# Patient Record
Sex: FEMALE | Race: WHITE | NOT HISPANIC OR LATINO | Employment: FULL TIME | URBAN - METROPOLITAN AREA
[De-identification: names, ages, dates, MRNs, and addresses within clinical notes are randomized per-mention and may not be internally consistent; named-entity substitution may affect disease eponyms.]

---

## 2017-01-05 ENCOUNTER — OFFICE VISIT (OUTPATIENT)
Dept: URGENT CARE | Facility: PHYSICIAN GROUP | Age: 32
End: 2017-01-05
Payer: OTHER MISCELLANEOUS

## 2017-01-05 VITALS
SYSTOLIC BLOOD PRESSURE: 128 MMHG | HEART RATE: 88 BPM | TEMPERATURE: 98.2 F | RESPIRATION RATE: 16 BRPM | DIASTOLIC BLOOD PRESSURE: 80 MMHG | OXYGEN SATURATION: 96 % | WEIGHT: 200 LBS | BODY MASS INDEX: 36.57 KG/M2

## 2017-01-05 DIAGNOSIS — J06.9 ACUTE UPPER RESPIRATORY INFECTION: ICD-10-CM

## 2017-01-05 DIAGNOSIS — J01.40 ACUTE PANSINUSITIS, RECURRENCE NOT SPECIFIED: ICD-10-CM

## 2017-01-05 DIAGNOSIS — R50.9 FEVER, UNSPECIFIED FEVER CAUSE: ICD-10-CM

## 2017-01-05 DIAGNOSIS — J10.1 INFLUENZA A: ICD-10-CM

## 2017-01-05 LAB
FLUAV+FLUBV AG SPEC QL IA: NORMAL
INT CON NEG: NEGATIVE
INT CON POS: POSITIVE

## 2017-01-05 PROCEDURE — 99214 OFFICE O/P EST MOD 30 MIN: CPT | Performed by: FAMILY MEDICINE

## 2017-01-05 PROCEDURE — 87804 INFLUENZA ASSAY W/OPTIC: CPT | Performed by: FAMILY MEDICINE

## 2017-01-05 RX ORDER — OSELTAMIVIR PHOSPHATE 75 MG/1
CAPSULE ORAL
Qty: 10 CAP | Refills: 0 | Status: SHIPPED | OUTPATIENT
Start: 2017-01-05 | End: 2017-01-05 | Stop reason: SDUPTHER

## 2017-01-05 RX ORDER — OSELTAMIVIR PHOSPHATE 75 MG/1
CAPSULE ORAL
Qty: 10 CAP | Refills: 0 | Status: SHIPPED | OUTPATIENT
Start: 2017-01-05 | End: 2017-09-21

## 2017-01-05 RX ORDER — AMOXICILLIN AND CLAVULANATE POTASSIUM 875; 125 MG/1; MG/1
TABLET, FILM COATED ORAL
Qty: 20 TAB | Refills: 0 | Status: SHIPPED | OUTPATIENT
Start: 2017-01-05 | End: 2017-09-21

## 2017-03-01 ENCOUNTER — HOSPITAL ENCOUNTER (OUTPATIENT)
Dept: RADIOLOGY | Facility: MEDICAL CENTER | Age: 32
End: 2017-03-01
Attending: NURSE PRACTITIONER
Payer: OTHER MISCELLANEOUS

## 2017-03-01 ENCOUNTER — OFFICE VISIT (OUTPATIENT)
Dept: URGENT CARE | Facility: PHYSICIAN GROUP | Age: 32
End: 2017-03-01
Payer: OTHER MISCELLANEOUS

## 2017-03-01 VITALS
OXYGEN SATURATION: 99 % | WEIGHT: 215 LBS | TEMPERATURE: 97.8 F | RESPIRATION RATE: 16 BRPM | HEART RATE: 74 BPM | HEIGHT: 62 IN | BODY MASS INDEX: 39.56 KG/M2 | DIASTOLIC BLOOD PRESSURE: 84 MMHG | SYSTOLIC BLOOD PRESSURE: 128 MMHG

## 2017-03-01 DIAGNOSIS — J40 BRONCHITIS: ICD-10-CM

## 2017-03-01 DIAGNOSIS — R05.9 COUGH: ICD-10-CM

## 2017-03-01 DIAGNOSIS — R06.02 SOB (SHORTNESS OF BREATH): ICD-10-CM

## 2017-03-01 PROCEDURE — 71020 DX-CHEST-2 VIEWS: CPT

## 2017-03-01 PROCEDURE — 99214 OFFICE O/P EST MOD 30 MIN: CPT | Performed by: NURSE PRACTITIONER

## 2017-03-01 RX ORDER — AZITHROMYCIN 250 MG/1
TABLET, FILM COATED ORAL
Qty: 6 TAB | Refills: 0 | Status: SHIPPED | OUTPATIENT
Start: 2017-03-01 | End: 2017-09-21

## 2017-03-01 RX ORDER — ALBUTEROL SULFATE 90 UG/1
2 AEROSOL, METERED RESPIRATORY (INHALATION) EVERY 6 HOURS PRN
Qty: 8.5 G | Refills: 1 | Status: SHIPPED | OUTPATIENT
Start: 2017-03-01 | End: 2017-09-21 | Stop reason: SDUPTHER

## 2017-03-01 ASSESSMENT — ENCOUNTER SYMPTOMS
COUGH: 1
SHORTNESS OF BREATH: 1
GASTROINTESTINAL NEGATIVE: 1
RHINORRHEA: 1
FEVER: 0
CHILLS: 0
MUSCULOSKELETAL NEGATIVE: 1
WHEEZING: 1

## 2017-03-01 NOTE — Clinical Note
March 1, 2017         Patient: Jennifer Gee   YOB: 1985   Date of Visit: 3/1/2017           To Whom it May Concern:    Jennifer Gee was seen in my clinic on 3/1/2017. She is able to return to work on 03/06/17.    If you have any questions or concerns, please don't hesitate to call.        Sincerely,       Luis Doss  Medical Assistant    Cathey J Hamman, A.P.CRUZ.  Electronically Signed

## 2017-03-02 NOTE — PROGRESS NOTES
Subjective:      Jennifer Gee is a 31 y.o. female who presents with Cough    Past Medical History   Diagnosis Date   • Gastritis    • Borderline personality disorder      cutter   • Bladder infection 2012   • Migraine 2014   • Pneumonia 2007     Social History     Social History   • Marital Status: Single     Spouse Name: N/A   • Number of Children: N/A   • Years of Education: N/A     Occupational History   • Not on file.     Social History Main Topics   • Smoking status: Never Smoker    • Smokeless tobacco: Never Used   • Alcohol Use: No   • Drug Use: Yes     Special: Inhaled, Marijuana      Comment: medical card   • Sexual Activity:     Partners: Female     Other Topics Concern   • Not on file     Social History Narrative     Family History   Problem Relation Age of Onset   • Stroke Neg Hx    • Diabetes Neg Hx    • Hyperlipidemia Mother    • Hypertension Mother    • Alcohol/Drug Father    • Heart Disease Maternal Grandfather    • Cancer Maternal Grandfather      Colon       Allergies: Review of patient's allergies indicates no known allergies.    This patient is a 31-year-old female who presents with complaint of cough for the last 4 weeks. She also reports chest congestion and painful swallowing. Patient was seen in early January of this year at urgent care and was diagnosed with influenza type B. Patient states she recovered from this and for the last 2 weeks of January felt very well. However she states on February 1, her cough returned and she has been coughing intermittently since. She states she has been short of breath with this. Denies fever, aches, or chills. Positive prior history of pneumonia per patient.        Cough  This is a new problem. The current episode started 1 to 4 weeks ago. The problem has been gradually worsening. The problem occurs every few minutes. The cough is non-productive. Associated symptoms include nasal congestion, postnasal drip, rhinorrhea, shortness of breath and  "wheezing. Pertinent negatives include no chills or fever. She has tried OTC cough suppressant for the symptoms. The treatment provided no relief.       Review of Systems   Constitutional: Positive for malaise/fatigue. Negative for fever and chills.   HENT: Positive for congestion, postnasal drip and rhinorrhea.    Respiratory: Positive for cough, shortness of breath and wheezing.    Gastrointestinal: Negative.    Musculoskeletal: Negative.    Skin: Negative.      All other systems reviewed and are negative      Objective:     /84 mmHg  Pulse 74  Temp(Src) 36.6 °C (97.8 °F)  Resp 16  Ht 1.575 m (5' 2\")  Wt 97.523 kg (215 lb)  BMI 39.31 kg/m2  SpO2 99%     Physical Exam   Constitutional: She is oriented to person, place, and time. She appears well-developed and well-nourished. No distress.   HENT:   Head: Normocephalic.   Right Ear: External ear normal.   Left Ear: External ear normal.   Nose: Nose normal.   Mouth/Throat: Oropharynx is clear and moist. No oropharyngeal exudate.   Eyes: Conjunctivae and EOM are normal. Pupils are equal, round, and reactive to light. Right eye exhibits no discharge.   Neck: Normal range of motion. Neck supple.   Cardiovascular: Normal rate and regular rhythm.    Pulmonary/Chest: Effort normal. She has wheezes.   Breath sounds harsh   Musculoskeletal: Normal range of motion.   Neurological: She is alert and oriented to person, place, and time.   Skin: Skin is warm and dry. She is not diaphoretic.   Psychiatric: She has a normal mood and affect. Her behavior is normal.   Vitals reviewed.    X-ray, chest:     /1/2017 6:33 PM    HISTORY/REASON FOR EXAM:  Shortness of Breath.      TECHNIQUE/EXAM DESCRIPTION AND NUMBER OF VIEWS:  Two views of the chest.    COMPARISON:  None.    FINDINGS:  The heart is normal in size.  No pulmonary infiltrates or consolidations are noted.  No pleural effusions are appreciated.           Impression        No active disease             "   Assessment/Plan:     1. SOB (shortness of breath)/Bronchitis    - DX-CHEST-2 VIEWS; Future  -Zithromax; start only for production of purulent sputum.   -albuterol      2. Cough    -tussionex PRN cough; clearly stated no driving or alcohol with this medication.  - DX-CHEST-2 VIEWS; Future

## 2017-09-21 ENCOUNTER — OFFICE VISIT (OUTPATIENT)
Dept: MEDICAL GROUP | Facility: CLINIC | Age: 32
End: 2017-09-21
Payer: OTHER MISCELLANEOUS

## 2017-09-21 VITALS
OXYGEN SATURATION: 99 % | RESPIRATION RATE: 14 BRPM | BODY MASS INDEX: 43.39 KG/M2 | HEART RATE: 85 BPM | DIASTOLIC BLOOD PRESSURE: 84 MMHG | SYSTOLIC BLOOD PRESSURE: 126 MMHG | WEIGHT: 221 LBS | HEIGHT: 60 IN | TEMPERATURE: 98.4 F

## 2017-09-21 DIAGNOSIS — E66.01 MORBID OBESITY WITH BMI OF 40.0-44.9, ADULT (HCC): ICD-10-CM

## 2017-09-21 DIAGNOSIS — J01.91 ACUTE RECURRENT SINUSITIS, UNSPECIFIED LOCATION: ICD-10-CM

## 2017-09-21 DIAGNOSIS — Z23 NEED FOR VACCINATION: ICD-10-CM

## 2017-09-21 DIAGNOSIS — R05.9 COUGH: ICD-10-CM

## 2017-09-21 DIAGNOSIS — R06.02 SOB (SHORTNESS OF BREATH): ICD-10-CM

## 2017-09-21 PROCEDURE — 90472 IMMUNIZATION ADMIN EACH ADD: CPT | Performed by: PHYSICIAN ASSISTANT

## 2017-09-21 PROCEDURE — 90732 PPSV23 VACC 2 YRS+ SUBQ/IM: CPT | Performed by: PHYSICIAN ASSISTANT

## 2017-09-21 PROCEDURE — 90686 IIV4 VACC NO PRSV 0.5 ML IM: CPT | Performed by: PHYSICIAN ASSISTANT

## 2017-09-21 PROCEDURE — 99213 OFFICE O/P EST LOW 20 MIN: CPT | Mod: 25 | Performed by: PHYSICIAN ASSISTANT

## 2017-09-21 PROCEDURE — 90715 TDAP VACCINE 7 YRS/> IM: CPT | Performed by: PHYSICIAN ASSISTANT

## 2017-09-21 PROCEDURE — 90471 IMMUNIZATION ADMIN: CPT | Performed by: PHYSICIAN ASSISTANT

## 2017-09-21 RX ORDER — ALBUTEROL SULFATE 90 UG/1
2 AEROSOL, METERED RESPIRATORY (INHALATION) EVERY 6 HOURS PRN
Qty: 8.5 G | Refills: 1 | Status: SHIPPED | OUTPATIENT
Start: 2017-09-21 | End: 2018-01-22 | Stop reason: SDUPTHER

## 2017-09-21 ASSESSMENT — PATIENT HEALTH QUESTIONNAIRE - PHQ9
CLINICAL INTERPRETATION OF PHQ2 SCORE: 5
SUM OF ALL RESPONSES TO PHQ QUESTIONS 1-9: 21
5. POOR APPETITE OR OVEREATING: 2 - MORE THAN HALF THE DAYS

## 2017-09-21 NOTE — PROGRESS NOTES
Chief Complaint   Patient presents with   • Establish Care       HISTORY OF PRESENT ILLNESS: Patient is a 32 y.o. female established patient who presents today for evaluation and management of:    Need for vaccination  Patient states that her coworker recommended she receive the pneumonia vaccination due to her frequent upper respiratory and respiratory infections. He presents today for Pneumovax 23, T Dap and influenza vaccines.    Morbid obesity with BMI of 40.0-44.9, adult ([facility])  Patient is aware of this and believes it is due to her polycystic ovarian syndrome.    Acute recurrent sinusitis  Patient has had sinusitis and upper and lower respiratory infections beyond 6 times this year. She is feeling too with antibiotics and medicine however thorough examination of her sinuses has never been completed.       Patient Active Problem List    Diagnosis Date Noted   • Acute recurrent sinusitis 09/21/2017   • Need for vaccination 09/21/2017   • Morbid obesity with BMI of 40.0-44.9, adult ([facility]) 09/21/2017   • Bipolar affective disorder (CMS-HCC) 02/16/2016   • PCOS (polycystic ovarian syndrome) 02/16/2016   • Acne vulgaris 02/16/2016       Allergies:Review of patient's allergies indicates no known allergies.    Current Outpatient Prescriptions   Medication Sig Dispense Refill   • albuterol 108 (90 Base) MCG/ACT Aero Soln inhalation aerosol Inhale 2 Puffs by mouth every 6 hours as needed for Shortness of Breath. 8.5 g 1   • lamotrigine (LAMICTAL) 100 MG Tab Take 1 Tab by mouth every day. 30 Tab 3   • Hydrocod Polst-CPM Polst ER (TUSSIONEX PENNKINETIC ER) 10-8 MG/5ML Suspension Extended Release Take 5 mL by mouth every 12 hours. 120 mL 0   • benzoyl peroxide-erythromycin (BENZAMYCIN) gel Bid to acne 30 g 5   • clonazepam (KLONOPIN) 0.5 MG TABS Take 0.25 mg by mouth 2 times a day.       No current facility-administered medications for this visit.        Social History   Substance Use Topics   • Smoking status: Never Smoker    • Smokeless tobacco: Never Used   • Alcohol use No       Family Status   Relation Status   • Mother Alive   • Father    • Maternal Grandfather    • Neg Hx      Family History   Problem Relation Age of Onset   • Hyperlipidemia Mother    • Hypertension Mother    • Alcohol/Drug Father    • Heart Disease Maternal Grandfather    • Cancer Maternal Grandfather      Colon   • Stroke Neg Hx    • Diabetes Neg Hx        Review of Systems:   Constitutional: Negative for fever, chills, weight loss.   HENT: Negative for ear pain, nosebleeds, sore throat and neck pain.    Eyes: Negative for blurred vision.   Respiratory: Negative for cough, sputum production, shortness of breath and wheezing.    Cardiovascular: Negative for chest pain, palpitations, orthopnea and leg swelling.    Genitourinary: Negative for dysuria, urgency and frequency.   Musculoskeletal: Negative for myalgias, back pain and joint pain.      Exam:  Blood pressure 126/84, pulse 85, temperature 36.9 °C (98.4 °F), resp. rate 14, height 1.524 m (5'), weight 100.2 kg (221 lb), SpO2 99 %.  Body mass index is 43.16 kg/m².  General:  Obese female in NAD  Head: is grossly normal. Mild tenderness to bilateral frontal and maxillary sinuses.  Neck: Supple without masses. Thyroid is not visibly enlarged.  Pulmonary:  Normal effort.   Cardiovascular:  Carotid and radial pulses are intact and equal bilaterally.  Extremities: no clubbing, cyanosis, or edema.    Medical decision-making and discussion:  1. Need for vaccination  - PneumoVax PPV23 =>1yo  - Tdap =>8yo IM  - Flu Quad Inj >3 Year Pre-Filled PF    2. Acute recurrent sinusitis, unspecified location  - REFERRAL TO ENT  - CT-LOCALIZATION LIMITED SINUSES; Future    3. SOB (shortness of breath)  - albuterol 108 (90 Base) MCG/ACT Aero Soln inhalation aerosol; Inhale 2 Puffs by mouth every 6 hours as needed for Shortness of Breath.  Dispense: 8.5 g; Refill: 1      5. Morbid obesity with BMI of 40.0-44.9, adult  (CMS-[facility])  - Patient identified as having weight management issue.  Appropriate orders and counseling given.      Please note that this dictation was created using voice recognition software. I have made every reasonable attempt to correct obvious errors, but I expect that there are errors of grammar and possibly content that I did not discover before finalizing the note.      Return for Female annual.

## 2017-09-21 NOTE — ASSESSMENT & PLAN NOTE
Patient states that her coworker recommended she receive the pneumonia vaccination due to her frequent upper respiratory and respiratory infections. He presents today for Pneumovax 23, T Dap and influenza vaccines.

## 2017-09-21 NOTE — ASSESSMENT & PLAN NOTE
Patient has had sinusitis and upper and lower respiratory infections beyond 6 times this year. She is feeling too with antibiotics and medicine however thorough examination of her sinuses has never been completed.

## 2017-10-09 ENCOUNTER — APPOINTMENT (OUTPATIENT)
Dept: RADIOLOGY | Facility: MEDICAL CENTER | Age: 32
End: 2017-10-09
Attending: PHYSICIAN ASSISTANT
Payer: OTHER MISCELLANEOUS

## 2017-10-12 ENCOUNTER — OFFICE VISIT (OUTPATIENT)
Dept: MEDICAL GROUP | Facility: CLINIC | Age: 32
End: 2017-10-12
Payer: OTHER MISCELLANEOUS

## 2017-10-12 VITALS
DIASTOLIC BLOOD PRESSURE: 84 MMHG | SYSTOLIC BLOOD PRESSURE: 124 MMHG | BODY MASS INDEX: 40.85 KG/M2 | TEMPERATURE: 99 F | WEIGHT: 222 LBS | OXYGEN SATURATION: 97 % | HEART RATE: 93 BPM | HEIGHT: 62 IN | RESPIRATION RATE: 16 BRPM

## 2017-10-12 DIAGNOSIS — J01.91 ACUTE RECURRENT SINUSITIS, UNSPECIFIED LOCATION: ICD-10-CM

## 2017-10-12 DIAGNOSIS — R21 RASH OF BACK: ICD-10-CM

## 2017-10-12 PROBLEM — Z23 NEED FOR VACCINATION: Status: RESOLVED | Noted: 2017-09-21 | Resolved: 2017-10-12

## 2017-10-12 PROCEDURE — 99212 OFFICE O/P EST SF 10 MIN: CPT | Performed by: PHYSICIAN ASSISTANT

## 2017-10-12 RX ORDER — METHYLPREDNISOLONE 4 MG/1
TABLET ORAL
Qty: 21 TAB | Refills: 0 | Status: SHIPPED | OUTPATIENT
Start: 2017-10-12 | End: 2018-01-22

## 2017-10-12 RX ORDER — TRIAMCINOLONE ACETONIDE 1 MG/ML
LOTION TOPICAL
Qty: 30 ML | Refills: 0 | Status: SHIPPED | OUTPATIENT
Start: 2017-10-12 | End: 2018-03-06

## 2017-10-12 ASSESSMENT — PATIENT HEALTH QUESTIONNAIRE - PHQ9: CLINICAL INTERPRETATION OF PHQ2 SCORE: 0

## 2017-10-12 NOTE — ASSESSMENT & PLAN NOTE
Patient went to [place] a couple of weeks ago for a business trip. She states she stayed at her boss's home overnight and was later informed that her boss has fleas in her home. She then took multiple airline flights which involved sitting for over 7 hours each way. She states that when she returned from her business trip her partner noticed a bunch of white spots on her back. Her brother suspected it was chigger bites. She has not done anything to try and make this better and it seems to be spreading as it was above her bra line and is now along her entire back from neck to low back. It is slightly itchy and she has been scratching it.

## 2017-10-12 NOTE — PROGRESS NOTES
Chief Complaint   Patient presents with   • Eye Problem     redness/itchy       HISTORY OF PRESENT ILLNESS: Patient is a 32 y.o. female established patient who presents today for evaluation and management of:    Rash of back  Patient went to [place] a couple of weeks ago for a business trip. She states she stayed at her boss's home overnight and was later informed that her boss has fleas in her home. She then took multiple airline flights which involved sitting for over 7 hours each way. She states that when she returned from her business trip her partner noticed a bunch of white spots on her back. Her brother suspected it was chigger bites. She has not done anything to try and make this better and it seems to be spreading as it was above her bra line and is now along her entire back from neck to low back. It is slightly itchy and she has been scratching it.        Patient Active Problem List    Diagnosis Date Noted   • Rash of back 10/12/2017   • Acute recurrent sinusitis 09/21/2017   • Morbid obesity with BMI of 40.0-44.9, adult ([facility]) 09/21/2017   • Bipolar affective disorder (CMS-HCC) 02/16/2016   • PCOS (polycystic ovarian syndrome) 02/16/2016   • Acne vulgaris 02/16/2016       Allergies:Review of patient's allergies indicates no known allergies.    Current Outpatient Prescriptions   Medication Sig Dispense Refill   • MethylPREDNISolone (MEDROL DOSEPAK) 4 MG Tablet Therapy Pack As directed on the packaging label. 21 Tab 0   • triamcinolone (KENALOG) 0.1 % lotion Apply a thin coat to back twice per day. 30 mL 0   • lamotrigine (LAMICTAL) 100 MG Tab Take 1 Tab by mouth every day. 30 Tab 3   • albuterol 108 (90 Base) MCG/ACT Aero Soln inhalation aerosol Inhale 2 Puffs by mouth every 6 hours as needed for Shortness of Breath. 8.5 g 1   • Hydrocod Polst-CPM Polst ER (TUSSIONEX PENNKINETIC ER) 10-8 MG/5ML Suspension Extended Release Take 5 mL by mouth every 12 hours. 120 mL 0   • benzoyl peroxide-erythromycin  "(BENZAMYCIN) gel Bid to acne 30 g 5   • clonazepam (KLONOPIN) 0.5 MG TABS Take 0.25 mg by mouth 2 times a day.       No current facility-administered medications for this visit.        Social History   Substance Use Topics   • Smoking status: Never Smoker   • Smokeless tobacco: Never Used   • Alcohol use No       Family Status   Relation Status   • Mother Alive   • Father    • Maternal Grandfather    • Neg Hx      Family History   Problem Relation Age of Onset   • Hyperlipidemia Mother    • Hypertension Mother    • Alcohol/Drug Father    • Heart Disease Maternal Grandfather    • Cancer Maternal Grandfather      Colon   • Stroke Neg Hx    • Diabetes Neg Hx        Review of Systems:   Constitutional: Negative for fever, chills, weight loss .   HENT: positive for ear pain, nosebleeds, congestion, sore throat and neck pain.    Eyes: Negative for blurred vision.   Respiratory: positive for cough. Negative sputum production, shortness of breath and wheezing.    Cardiovascular: Negative for chest pain, palpitations, and leg swelling.    Skin: positive for rash and itching.   Neurological: Negative for dizziness, tingling, tremors, sensory change, focal weakness.     Exam:  Blood pressure 124/84, pulse 93, temperature 37.2 °C (99 °F), resp. rate 16, height 1.575 m (5' 2\"), weight 100.7 kg (222 lb), SpO2 97 %.  Body mass index is 40.6 kg/m².  General:  Obese female in NAD  Head: is grossly normal.  Neck: Supple without masses. Thyroid is not visibly enlarged.  Pulmonary: Clear to ausculation. Normal effort. No rales, ronchi, or wheezing.  Cardiovascular: Regular rate and rhythm without murmur. Carotid and radial pulses are intact and equal bilaterally.  Extremities: no clubbing, cyanosis, or edema.  Skin: Diffuse non-blanching white diffuse papules on the entirety of the back skin with multiple crusting and erythematous  excoriations in the regions where the patient can reach with her fingers.     Medical " decision-making and discussion:  1. Rash of back  This is not suspected to be scabies or fleas due to lack of tunneling or erythema. This may be an allergic reaction to an unknown source or, potentially arthropod bites. Patient will try the following treatments and f no better in 2 weeks, will treat with permethrin 5% cream.   - MethylPREDNISolone (MEDROL DOSEPAK) 4 MG Tablet Therapy Pack; As directed on the packaging label.  Dispense: 21 Tab; Refill: 0  - triamcinolone (KENALOG) 0.1 % lotion; Apply a thin coat to back twice per day.  Dispense: 30 mL; Refill: 0      Please note that this dictation was created using voice recognition software. I have made every reasonable attempt to correct obvious errors, but I expect that there are errors of grammar and possibly content that I did not discover before finalizing the note.      Return in about 2 weeks (around 10/26/2017), or if symptoms worsen or fail to improve.

## 2018-01-22 ENCOUNTER — OFFICE VISIT (OUTPATIENT)
Dept: MEDICAL GROUP | Facility: CLINIC | Age: 33
End: 2018-01-22
Payer: OTHER MISCELLANEOUS

## 2018-01-22 VITALS
HEIGHT: 61 IN | DIASTOLIC BLOOD PRESSURE: 72 MMHG | SYSTOLIC BLOOD PRESSURE: 118 MMHG | TEMPERATURE: 98.6 F | BODY MASS INDEX: 40.78 KG/M2 | HEART RATE: 78 BPM | OXYGEN SATURATION: 98 % | WEIGHT: 216 LBS

## 2018-01-22 DIAGNOSIS — E66.01 MORBID OBESITY WITH BMI OF 40.0-44.9, ADULT (HCC): ICD-10-CM

## 2018-01-22 DIAGNOSIS — L70.0 ACNE VULGARIS: ICD-10-CM

## 2018-01-22 DIAGNOSIS — J45.20 MILD INTERMITTENT ASTHMA WITHOUT COMPLICATION: ICD-10-CM

## 2018-01-22 PROCEDURE — 99213 OFFICE O/P EST LOW 20 MIN: CPT | Performed by: PHYSICIAN ASSISTANT

## 2018-01-22 RX ORDER — ALBUTEROL SULFATE 90 UG/1
2 AEROSOL, METERED RESPIRATORY (INHALATION) EVERY 6 HOURS PRN
Qty: 8.5 G | Refills: 11 | Status: SHIPPED | OUTPATIENT
Start: 2018-01-22 | End: 2019-03-06 | Stop reason: SDUPTHER

## 2018-01-22 RX ORDER — ERYTHROMYCIN AND BENZOYL PEROXIDE 30; 50 MG/G; MG/G
GEL TOPICAL
Qty: 30 G | Refills: 5 | Status: SHIPPED | OUTPATIENT
Start: 2018-01-22 | End: 2019-04-30

## 2018-01-22 ASSESSMENT — PATIENT HEALTH QUESTIONNAIRE - PHQ9: CLINICAL INTERPRETATION OF PHQ2 SCORE: 0

## 2018-01-22 NOTE — LETTER
January 22, 2018        Jennifer Gee  [address] [place] [address]        Dear Jennifer:    I support this patient in completing the gastric procedure to assist in weight loss.    If you have any questions or concerns, please don't hesitate to call.        Sincerely,        Mari Cordova P.A.-C.    Electronically Signed

## 2018-01-23 NOTE — ASSESSMENT & PLAN NOTE
Patient recently began seeking help for this by consulting a surgeon to complete a gastric bypass or banding surgery. She has lost 6 lbs since October, 2017 and recently started metformin which has curbed her appetite and is helping her to lose weight.

## 2018-01-23 NOTE — ASSESSMENT & PLAN NOTE
While she is sick, she uses her albuterol inhaler about 4 days per week about twice per day but while she is well, the patient uses her albuterol inhaler less than once per week. She recently moved away from a home with a dog that was unsanitary and her symptoms have improved.

## 2018-01-23 NOTE — PROGRESS NOTES
Chief Complaint   Patient presents with   • Weight Loss       HISTORY OF PRESENT ILLNESS: Patient is a 32 y.o. female established patient who presents today for evaluation and management of:    Morbid obesity with BMI of 40.0-44.9, adult ([facility])  Patient recently began seeking help for this by consulting a surgeon to complete a gastric bypass or banding surgery. She has lost 6 lbs since October, 2017 and recently started metformin which has curbed her appetite and is helping her to lose weight.     Mild intermittent asthma without complication  While she is sick, she uses her albuterol inhaler about 4 days per week about twice per day but while she is well, the patient uses her albuterol inhaler less than once per week. She recently moved away from a home with a dog that was unsanitary and her symptoms have improved.     Acne vulgaris  Jena is requesting medication refills on her benzamycin gel today as this has worked well for her in the past. She has multiple cystic lesions on her face.        Patient Active Problem List    Diagnosis Date Noted   • Mild intermittent asthma without complication 01/22/2018   • Rash of back 10/12/2017   • Acute recurrent sinusitis 09/21/2017   • Morbid obesity with BMI of 40.0-44.9, adult ([facility]) 09/21/2017   • Bipolar affective disorder (CMS-HCC) 02/16/2016   • PCOS (polycystic ovarian syndrome) 02/16/2016   • Acne vulgaris 02/16/2016       Allergies:Patient has no known allergies.    Current Outpatient Prescriptions   Medication Sig Dispense Refill   • metformin (GLUCOPHAGE) 500 MG Tab Take 500 mg by mouth 3 times a day.     • benzoyl peroxide-erythromycin (BENZAMYCIN) gel Bid to acne 30 g 5   • albuterol 108 (90 Base) MCG/ACT Aero Soln inhalation aerosol Inhale 2 Puffs by mouth every 6 hours as needed for Shortness of Breath. 8.5 g 11   • lamotrigine (LAMICTAL) 100 MG Tab Take 1 Tab by mouth every day. 30 Tab 3   • triamcinolone (KENALOG) 0.1 % lotion Apply a thin coat to back  twice per day. 30 mL 0   • Hydrocod Polst-CPM Polst ER (TUSSIONEX PENNKINETIC ER) 10-8 MG/5ML Suspension Extended Release Take 5 mL by mouth every 12 hours. 120 mL 0   • clonazepam (KLONOPIN) 0.5 MG TABS Take 0.25 mg by mouth 2 times a day.       No current facility-administered medications for this visit.        Social History   Substance Use Topics   • Smoking status: Never Smoker   • Smokeless tobacco: Never Used   • Alcohol use No       Family Status   Relation Status   • Mother Alive   • Father    • Maternal Grandfather    • Neg Hx      Family History   Problem Relation Age of Onset   • Hyperlipidemia Mother    • Hypertension Mother    • Alcohol/Drug Father    • Heart Disease Maternal Grandfather    • Cancer Maternal Grandfather      Colon   • Stroke Neg Hx    • Diabetes Neg Hx        Review of Systems:   Constitutional: Negative for fever, chills, unintentional weight loss and malaise/fatigue.   HENT: Negative for ear pain, nosebleeds, congestion, sore throat and neck pain due to use of neti-pot sinus rinse every other day.     Eyes: Negative for blurred vision.   Respiratory: Negative for cough, sputum production, shortness of breath and wheezing except when ill with upper respiratory infection, not currently.    Cardiovascular: Negative for chest pain, palpitations, orthopnea and leg swelling.   Gastrointestinal: Negative for heartburn, nausea, vomiting and abdominal pain.   Genitourinary: Negative for dysuria, urgency and frequency.   Musculoskeletal: Negative for myalgias, back pain and joint pain.   Skin: Negative for rash and itching.   Neurological: Negative for dizziness, tingling, tremors, sensory change, focal weakness and headaches.   Endo/Heme/Allergies: Does not bruise/bleed easily.   Psychiatric/Behavioral: Positive for well-controlled depression. Negative for suicidal ideas and memory loss.  The patient is frequently nervous/anxious and does not have insomnia.      Exam:  Blood pressure  "118/72, pulse 78, temperature 37 °C (98.6 °F), height 1.549 m (5' 1\"), weight 98 kg (216 lb), SpO2 98 %.  Body mass index is 40.81 kg/m².  General: Morbidly Obese female in NAD  Head: is grossly normal.  Neck: Supple without masses. Thyroid is not visibly enlarged.  Pulmonary: Clear to ausculation. Normal effort. No rales, ronchi, or wheezing.  Cardiovascular: Regular rate and rhythm without murmur. Carotid and radial pulses are intact and equal bilaterally.  Extremities: no clubbing, cyanosis, or edema.    Medical decision-making and discussion:  1. Morbid obesity with BMI of 40.0-44.9, adult ([facility])  A letter of support was written for this patient to continue through with a gastric surgery for weight loss.     2. Acne vulgaris  - benzoyl peroxide-erythromycin (BENZAMYCIN) gel; Bid to acne  Dispense: 30 g; Refill: 5    3. Mild intermittent asthma without complication  - albuterol 108 (90 Base) MCG/ACT Aero Soln inhalation aerosol; Inhale 2 Puffs by mouth every 6 hours as needed for Shortness of Breath.  Dispense: 8.5 g; Refill: 11      Please note that this dictation was created using voice recognition software. I have made every reasonable attempt to correct obvious errors, but I expect that there are errors of grammar and possibly content that I did not discover before finalizing the note.      Return in about 4 weeks (around 2/19/2018) for weight check.  "

## 2018-01-23 NOTE — ASSESSMENT & PLAN NOTE
Jena is requesting medication refills on her benzamycin gel today as this has worked well for her in the past. She has multiple cystic lesions on her face.

## 2018-03-06 ENCOUNTER — OFFICE VISIT (OUTPATIENT)
Dept: MEDICAL GROUP | Facility: CLINIC | Age: 33
End: 2018-03-06
Payer: OTHER MISCELLANEOUS

## 2018-03-06 VITALS
TEMPERATURE: 99 F | SYSTOLIC BLOOD PRESSURE: 128 MMHG | DIASTOLIC BLOOD PRESSURE: 64 MMHG | HEART RATE: 88 BPM | RESPIRATION RATE: 18 BRPM | BODY MASS INDEX: 39.65 KG/M2 | OXYGEN SATURATION: 98 % | WEIGHT: 210 LBS | HEIGHT: 61 IN

## 2018-03-06 DIAGNOSIS — Z79.899 CHRONIC USE OF BENZODIAZEPINE FOR THERAPEUTIC PURPOSE: ICD-10-CM

## 2018-03-06 DIAGNOSIS — G62.9 POLYNEUROPATHY: ICD-10-CM

## 2018-03-06 DIAGNOSIS — E66.9 OBESITY (BMI 35.0-39.9 WITHOUT COMORBIDITY): ICD-10-CM

## 2018-03-06 DIAGNOSIS — R94.131 NONSPECIFIC ABNORMAL ELECTROMYOGRAM (EMG): ICD-10-CM

## 2018-03-06 DIAGNOSIS — F31.62 BIPOLAR DISORDER, CURRENT EPISODE MIXED, MODERATE (HCC): ICD-10-CM

## 2018-03-06 DIAGNOSIS — E28.2 PCOS (POLYCYSTIC OVARIAN SYNDROME): ICD-10-CM

## 2018-03-06 PROBLEM — E66.01 MORBID OBESITY WITH BMI OF 40.0-44.9, ADULT (HCC): Status: RESOLVED | Noted: 2017-09-21 | Resolved: 2018-03-06

## 2018-03-06 PROCEDURE — 99214 OFFICE O/P EST MOD 30 MIN: CPT | Performed by: PHYSICIAN ASSISTANT

## 2018-03-06 RX ORDER — LAMOTRIGINE 100 MG/1
100 TABLET ORAL DAILY
Qty: 90 TAB | Refills: 3 | Status: SHIPPED | OUTPATIENT
Start: 2018-03-06 | End: 2019-04-30

## 2018-03-06 RX ORDER — GABAPENTIN 100 MG/1
100 CAPSULE ORAL 3 TIMES DAILY
COMMUNITY
End: 2018-03-06

## 2018-03-06 RX ORDER — GABAPENTIN 100 MG/1
100 CAPSULE ORAL 3 TIMES DAILY
Qty: 270 CAP | Refills: 1 | Status: SHIPPED | OUTPATIENT
Start: 2018-03-06 | End: 2018-12-03

## 2018-03-07 NOTE — ASSESSMENT & PLAN NOTE
Patient has been taking metformin recently and since starting this, she has lost 12lbs. She was started on metformin due to the weight gain from PCOS by her gynecologist.

## 2018-03-07 NOTE — ASSESSMENT & PLAN NOTE
Well controlled at this time with lamictal and clonazepam. Patient states her symptoms were cycling between extreme depression and joking, elation. She feels very well regulated at this time.

## 2018-03-07 NOTE — PROGRESS NOTES
Chief Complaint   Patient presents with   • Weight Loss     weight check       HISTORY OF PRESENT ILLNESS: Patient is a 32 y.o. female established patient who presents today for evaluation and management of:    Polyneuropathy (CMS-HCC)  Discovered on EMG recently, the patient has been taking gabapentin 100mg TID which has greaty relieved her arm and leg pain and tingling sensation.     PCOS (polycystic ovarian syndrome)  Patient has been taking metformin recently and since starting this, she has lost 12lbs. She was started on metformin due to the weight gain from PCOS by her gynecologist.     Nonspecific abnormal electromyogram (EMG)  Recently completed. Severely abnormal per patient. Records are being obtained.     Chronic use of benzodiazepine for therapeutic purpose  Patient has been using clonazepam 0.5mg tab 45 tab per month as prescribed by her psychiatrist who will no longer see her due to her recent increase in income. She is requesting to transfer this prescription to her PCP's practice as soon as her most recent refill runs out in 2 months.  She is willing to stop using benzodiazepines if she is able to find an alternative medication that controls her anxiety well.     Bipolar affective disorder  Well controlled at this time with lamictal and clonazepam. Patient states her symptoms were cycling between extreme depression and joking, elation. She feels very well regulated at this time.    Obesity (BMI 35.0-39.9 without comorbidity) ([facility])  BMI has reduced to 39.68 down from 40.6 in Oct 2017. She no longer qualifies for Bariatric surgery which she is okay with at this time.        Patient Active Problem List    Diagnosis Date Noted   • Polyneuropathy (CMS-HCC) 03/06/2018   • Chronic use of benzodiazepine for therapeutic purpose 03/06/2018   • Nonspecific abnormal electromyogram (EMG) 03/06/2018   • Obesity (BMI 35.0-39.9 without comorbidity) ([facility]) 03/06/2018   • Mild intermittent asthma without complication  2018   • Rash of back 10/12/2017   • Acute recurrent sinusitis 2017   • Bipolar affective disorder (CMS-HCC) 2016   • PCOS (polycystic ovarian syndrome) 2016   • Acne vulgaris 2016       Allergies:Patient has no known allergies.    Current Outpatient Prescriptions   Medication Sig Dispense Refill   • gabapentin (NEURONTIN) 100 MG Cap Take 1 Cap by mouth 3 times a day. 270 Cap 1   • metformin (GLUCOPHAGE) 1000 MG tablet Take 1 Tab by mouth 2 times a day, with meals. 180 Tab 1   • lamoTRIgine (LAMICTAL) 100 MG Tab Take 1 Tab by mouth every day. 90 Tab 3   • benzoyl peroxide-erythromycin (BENZAMYCIN) gel Bid to acne 30 g 5   • albuterol 108 (90 Base) MCG/ACT Aero Soln inhalation aerosol Inhale 2 Puffs by mouth every 6 hours as needed for Shortness of Breath. 8.5 g 11   • clonazepam (KLONOPIN) 0.5 MG TABS Take 0.25 mg by mouth 2 times a day.       No current facility-administered medications for this visit.        Social History   Substance Use Topics   • Smoking status: Never Smoker   • Smokeless tobacco: Never Used   • Alcohol use No       Family Status   Relation Status   • Mother Alive   • Father    • Maternal Grandfather    • Neg Hx      Family History   Problem Relation Age of Onset   • Hyperlipidemia Mother    • Hypertension Mother    • Alcohol/Drug Father    • Heart Disease Maternal Grandfather    • Cancer Maternal Grandfather      Colon   • Stroke Neg Hx    • Diabetes Neg Hx        Review of Systems:   Constitutional: Negative for fever, chills, unintentional weight loss and malaise/fatigue.   HENT: Negative for ear pain, nosebleeds, congestion, sore throat and neck pain.    Eyes: Negative for blurred vision.   Respiratory: Negative for cough, sputum production, shortness of breath and wheezing.    Cardiovascular: Negative for chest pain, palpitations, orthopnea and leg swelling.   Gastrointestinal: Negative for heartburn, nausea, vomiting and abdominal pain.  "  Genitourinary: Negative for dysuria, urgency and frequency.   Musculoskeletal: Negative for myalgias, back pain and joint pain.   Skin: Negative for rash and itching.   Neurological: Positive for tingling, sensory change in bilateral arms and occasionally in feet and lower legs. Negative for dizziness, tremors, focal weakness and headaches.   Endo/Heme/Allergies: Does not bruise/bleed easily.   Psychiatric/Behavioral: Positive for well-controlled bipolar disorder. Negative for suicidal ideas and memory loss.  The patient is frequently nervous/anxious and does not have insomnia.      Exam:  Blood pressure 128/64, pulse 88, temperature 37.2 °C (99 °F), resp. rate 18, height 1.549 m (5' 1\"), weight 95.3 kg (210 lb), SpO2 98 %.  Body mass index is 39.68 kg/m².  General:  Obese female in NAD  Head: is grossly normal.  Neck: Supple without masses. Thyroid is not visibly enlarged.  Pulmonary: Clear to ausculation. Normal effort. No rales, ronchi, or wheezing.  Cardiovascular: Regular rate and rhythm without murmur. Carotid and radial pulses are intact and equal bilaterally.  Extremities: no clubbing, cyanosis, or edema.  Behavioral: Patient is frequently making jokes and is quite jovial throughout her history and physical today.    Medical decision-making and discussion:  1. Polyneuropathy (CMS-HCC)  - gabapentin (NEURONTIN) 100 MG Cap; Take 1 Cap by mouth 3 times a day.  Dispense: 270 Cap; Refill: 1  - CMP (12)    2. PCOS (polycystic ovarian syndrome)  - metformin (GLUCOPHAGE) 1000 MG tablet; Take 1 Tab by mouth 2 times a day, with meals.  Dispense: 180 Tab; Refill: 1    3. Nonspecific abnormal electromyogram (EMG)  - REFERRAL TO NEUROLOGY    4. Chronic use of benzodiazepine for therapeutic purpose  Patient was advised that over the next couple of months, she should attempt to reduce her daily dose down to no more than 0.5 tablet clonazepam per day in order to transition to a new antianxiety medication.    5. Bipolar " disorder, current episode mixed, moderate (CMS-HCC)  - lamoTRIgine (LAMICTAL) 100 MG Tab; Take 1 Tab by mouth every day.  Dispense: 90 Tab; Refill: 3    6. Obesity (BMI 35.0-39.9 without comorbidity)  Patient continues losing weight since starting metformin.  - Patient identified as having weight management issue.  Appropriate orders and counseling given.      Please note that this dictation was created using voice recognition software. I have made every reasonable attempt to correct obvious errors, but I expect that there are errors of grammar and possibly content that I did not discover before finalizing the note.      Return in about 2 months (around 5/6/2018) for follow up anxiety.

## 2018-03-07 NOTE — ASSESSMENT & PLAN NOTE
BMI has reduced to 39.68 down from 40.6 in Oct 2017. She no longer qualifies for Bariatric surgery which she is okay with at this time.

## 2018-03-07 NOTE — ASSESSMENT & PLAN NOTE
Patient has been using clonazepam 0.5mg tab 45 tab per month as prescribed by her psychiatrist who will no longer see her due to her recent increase in income. She is requesting to transfer this prescription to her PCP's practice as soon as her most recent refill runs out in 2 months.  She is willing to stop using benzodiazepines if she is able to find an alternative medication that controls her anxiety well.

## 2018-07-10 ENCOUNTER — OFFICE VISIT (OUTPATIENT)
Dept: NEUROLOGY | Facility: MEDICAL CENTER | Age: 33
End: 2018-07-10
Payer: OTHER MISCELLANEOUS

## 2018-07-10 ENCOUNTER — HOSPITAL ENCOUNTER (OUTPATIENT)
Dept: LAB | Facility: MEDICAL CENTER | Age: 33
End: 2018-07-10
Attending: PSYCHIATRY & NEUROLOGY
Payer: OTHER MISCELLANEOUS

## 2018-07-10 VITALS
RESPIRATION RATE: 16 BRPM | OXYGEN SATURATION: 97 % | WEIGHT: 202.16 LBS | SYSTOLIC BLOOD PRESSURE: 118 MMHG | TEMPERATURE: 98.2 F | BODY MASS INDEX: 37.2 KG/M2 | HEIGHT: 62 IN | HEART RATE: 92 BPM | DIASTOLIC BLOOD PRESSURE: 88 MMHG

## 2018-07-10 DIAGNOSIS — G62.9 POLYNEUROPATHY: Primary | ICD-10-CM

## 2018-07-10 DIAGNOSIS — G62.9 POLYNEUROPATHY: ICD-10-CM

## 2018-07-10 LAB
ALBUMIN SERPL BCP-MCNC: 4.2 G/DL (ref 3.2–4.9)
BUN SERPL-MCNC: 9 MG/DL (ref 8–22)
CALCIUM SERPL-MCNC: 9.4 MG/DL (ref 8.5–10.5)
CHLORIDE SERPL-SCNC: 105 MMOL/L (ref 96–112)
CO2 SERPL-SCNC: 23 MMOL/L (ref 20–33)
CREAT SERPL-MCNC: 0.62 MG/DL (ref 0.5–1.4)
FOLATE SERPL-MCNC: >24 NG/ML
GLUCOSE SERPL-MCNC: 99 MG/DL (ref 65–99)
PHOSPHATE SERPL-MCNC: 3 MG/DL (ref 2.5–4.5)
POTASSIUM SERPL-SCNC: 3.6 MMOL/L (ref 3.6–5.5)
SODIUM SERPL-SCNC: 139 MMOL/L (ref 135–145)
TSH SERPL DL<=0.005 MIU/L-ACNC: 3.84 UIU/ML (ref 0.38–5.33)
VIT B12 SERPL-MCNC: 334 PG/ML (ref 211–911)

## 2018-07-10 PROCEDURE — 99205 OFFICE O/P NEW HI 60 MIN: CPT | Performed by: PSYCHIATRY & NEUROLOGY

## 2018-07-10 PROCEDURE — 86038 ANTINUCLEAR ANTIBODIES: CPT

## 2018-07-10 PROCEDURE — 86225 DNA ANTIBODY NATIVE: CPT

## 2018-07-10 PROCEDURE — 83655 ASSAY OF LEAD: CPT

## 2018-07-10 PROCEDURE — 80069 RENAL FUNCTION PANEL: CPT

## 2018-07-10 PROCEDURE — 84446 ASSAY OF VITAMIN E: CPT

## 2018-07-10 PROCEDURE — 82175 ASSAY OF ARSENIC: CPT

## 2018-07-10 PROCEDURE — 86235 NUCLEAR ANTIGEN ANTIBODY: CPT

## 2018-07-10 PROCEDURE — 82300 ASSAY OF CADMIUM: CPT

## 2018-07-10 PROCEDURE — 84443 ASSAY THYROID STIM HORMONE: CPT

## 2018-07-10 PROCEDURE — 82746 ASSAY OF FOLIC ACID SERUM: CPT

## 2018-07-10 PROCEDURE — 84160 ASSAY OF PROTEIN ANY SOURCE: CPT

## 2018-07-10 PROCEDURE — 86800 THYROGLOBULIN ANTIBODY: CPT

## 2018-07-10 PROCEDURE — 82607 VITAMIN B-12: CPT

## 2018-07-10 PROCEDURE — 36415 COLL VENOUS BLD VENIPUNCTURE: CPT

## 2018-07-10 PROCEDURE — 83516 IMMUNOASSAY NONANTIBODY: CPT

## 2018-07-10 PROCEDURE — 86255 FLUORESCENT ANTIBODY SCREEN: CPT

## 2018-07-10 PROCEDURE — 85652 RBC SED RATE AUTOMATED: CPT

## 2018-07-10 PROCEDURE — 83825 ASSAY OF MERCURY: CPT

## 2018-07-10 PROCEDURE — 84165 PROTEIN E-PHORESIS SERUM: CPT

## 2018-07-10 RX ORDER — NAPROXEN SODIUM 550 MG/1
550 TABLET ORAL 2 TIMES DAILY WITH MEALS
Qty: 60 TAB | Refills: 3 | Status: SHIPPED | OUTPATIENT
Start: 2018-07-10 | End: 2019-07-10 | Stop reason: SDUPTHER

## 2018-07-10 RX ORDER — GABAPENTIN 300 MG/1
CAPSULE ORAL
Qty: 120 CAP | Refills: 1 | Status: SHIPPED | OUTPATIENT
Start: 2018-07-10 | End: 2018-09-27 | Stop reason: SDUPTHER

## 2018-07-10 RX ORDER — IBUPROFEN 200 MG
200 TABLET ORAL EVERY 6 HOURS PRN
COMMUNITY
End: 2018-11-21

## 2018-07-10 ASSESSMENT — ENCOUNTER SYMPTOMS
DEPRESSION: 0
SENSORY CHANGE: 1
TINGLING: 1
NECK PAIN: 0
FALLS: 0
MEMORY LOSS: 0
LOSS OF CONSCIOUSNESS: 0
FOCAL WEAKNESS: 1
CONSTIPATION: 0
MYALGIAS: 0
DOUBLE VISION: 0

## 2018-07-10 ASSESSMENT — PAIN SCALES - GENERAL: PAINLEVEL: 8=MODERATE-SEVERE PAIN

## 2018-07-11 LAB — ERYTHROCYTE [SEDIMENTATION RATE] IN BLOOD BY WESTERGREN METHOD: 13 MM/HOUR (ref 0–20)

## 2018-07-11 NOTE — PROGRESS NOTES
Subjective:      Jennifer Gee is a 32 y.o. female who presents for consultation from the office of Mari Cordova PA-C, with a nearly one-year history of progressive bilateral paresthesias and weakness in all 4 extremities.    PARK Rodriguez is a pleasant 32-year-old right-handed  female whose symptoms started rather indolently, without a singular and inciting event, which have slowly progressed since then. She initially had right-sided hand numbness and tingling, feeling of coldness in the fingers. With time it began to spread proximally up the forearm and then the left hand and forearm became involved in mirror-type distribution. It was some loss of dexterity. She seemed to be at best in the mornings, symptoms never awakened her from sleep, but they did progress as the day wore on. 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 Still, the symptoms continued to ascend proximally up to the shoulders and then eventually down into the rest of her body and both feet.    Symptoms have always been worse on the right side. She describes a constant discomfort with both hands, the numbness and cold sensations are no longer dependent on use of the extremities, time of day, rest, etc. Head and neck position, Valsalva, etc. also had no effect. She denies neck pain with radiating symptoms into the upper extremities, Lhermitte's phenomena, etc.    In both lower extremities, also involved in their entirety, she describes the same type of sensory distortion, balance is only slightly curtailed, she is not walking with a foot drop, she does not trip, she does not feel more unsteady than usual. Bowel and bladder function have been stable. In addition, she denies any cognitive symptoms, malaise or fatigue, visual loss or double vision, or bulbar symptoms.    She did undergo a work up, but EMG/NCV studies of both upper extremities from November, 2017 was done, I reviewed copies of the  report, revealing both delayed and CVA and increased distal latencies consistent with a sensory motor polyneuropathy, there was no demonstration on EMG of cervical radiculopathy. Unfortunately, lower extremities were not studied. Neurologic consultation was then requested. On Lamictal 100 mg daily for bipolar disease, this was not adjusted, gabapentin 100 mg, 3 times a day was added, and this has provided benefit. Naproxen 500 mg, twice a day, also provided benefit. She now presents.    She has a history of migraine without aura, bipolar disease with borderline personality, PCOS, but no history of diagnosed diabetes, thyroid disease, malignancy, MS, neurodegenerative disease, autoimmune disease, blood dyscrasia, liver or kidney disease, hypertension, CAD, CVA or PVD. There is no surgical history of note from my standpoint. Her mother evidently does have a history of sensory distortions in both feet though a diagnosis has never been established, her mother's twin sister has a daughter does have a diagnosis of neuropathy. Her father  at a young age from pneumonia. Both her half brothers on her mother's side are alive and well without similar symptoms. She does not smoke or drink.    She is on Glucophage, Lamictal 100 mg daily, Neurontin 100 mg, 3 times a day, Klonopin and Motrin. There are no known drug allergies.    Review of Systems   Constitutional: Negative for malaise/fatigue.   Eyes: Negative for double vision.   Cardiovascular: Negative for leg swelling.   Gastrointestinal: Negative for constipation.   Genitourinary: Negative for dysuria and frequency.   Musculoskeletal: Negative for falls, myalgias and neck pain.   Skin: Negative for rash.   Neurological: Positive for tingling, sensory change and focal weakness. Negative for loss of consciousness.   Psychiatric/Behavioral: Negative for depression and memory loss.   All other systems reviewed and are negative.       Objective:     /88   Pulse 92    "Temp 36.8 °C (98.2 °F)   Resp 16   Ht 1.575 m (5' 2\")   Wt 91.7 kg (202 lb 2.6 oz)   SpO2 97%   BMI 36.98 kg/m²      Physical Exam    She appears in no acute distress. Her vital signs are stable. There is no malar rash or temporal tenderness. The neck is supple, range of motion is full. Lhermitte's phenomenon was absent, carotid pulses are present bilaterally without asymmetry or bruits, compression maneuvers are negative. There is some minimal tenderness to palpation of both wrists and medial elbows, but compression maneuvers are negative. Pes cavus is present bilaterally, her feet are quite small, body habitus is a little unusual, there is significant obesity, almost a hyperflexibility at the knees as she walks, she is knock-knee'd. Distal pulses are intact.    Cognition is intact, she is fully oriented, there is no aphasia, apraxia, or attention.    PERRLA/EOMI, visual fields are full to finger counting, funduscopic exam reveals sharp disc margins without pallor, facial movements are symmetric, sensory exam is intact to temperature, light touch and pinprick bilaterally, jaw jerk is absent, the tongue and uvula are midline without dysarthria, shoulder shrug and head rotation are intact.    Musculoskeletal exam reveals no evidence of atrophy or fasciculations. Tone is normal, there is no tremor, asterixis, or drift. Strength in the upper extremities including the hands bilaterally is grossly intact. There is weakness with both dorsiflexion and to a lesser degree plantar flexion in the feet, right more than left. There may be some weakness with knee extension on the right. The knee jerks are present, left brisker than right, the ankle jerks are absent bilaterally. Other reflexes are hypoactive. Both toes are equivocal to downgoing.    She walks flat-footed, does not need to look at the ground but she is unsteady nonetheless. There was a waddling quality. She cannot walk on her toes, heel walking is a little " easier. Repetitive movements with both feet are notably curtailed. These movements are better with the hands though they're still slowed. Amplitudes are maintained. There is no appendicular dystaxia with any of the extremities.    Sensory exam shows some decrement in the hands to temperature, vibration and JPS are intact. Pinprick seems to be slightly diminished in the forearms bilaterally. In the lower extremities, there was a graded loss of pin below the ankles, vibration and JPS seemed to be intact. Romberg cannot be accurately assessed because of the knee deformities.     Assessment/Plan:     1. Polyneuropathy (HCC)  Surprising find on EMG/NCV, unfortunately the lower extremities were not involved, the study does need to be repeated to involve the legs but also to see if there has been some progression with the upper extremity's. Clinically this would be consistent with a demyelinating process, the results of her initial NC these are consistent with this. There may be a family history, she has pes cavus and some other musculoskeletal issues that are often seen in hereditary neuropathies. She lacks a history of typical risk otherwise. CIDP, other autoimmune or inflammatory processes, as well as vitamin deficiency states, thyroid dysfunction, paraproteinemias, etc. also need to be ruled out.    Possibility of a cervical spine process also needs to be entertained, and though her exam is not overtly myelopathic, the severity of the peripheral neuropathy may mask CNS findings such as hyperreflexia, Babinski signs, increased tone, etc. MRI of the cervical spine is indicated. If this is positive, CSF studies, NMO antibodies, etc., will likely be necessary.    Symptomatically, gabapentin will be increased, Anaprox 500 mg, twice a day, added again since these provided some benefit. Neurontin 300 mg daily at bedtime will be started, and in 2 weeks increase to 600 mg. Side effects were reviewed.    Face-to-face time was  spent reviewing all of the above. We talked about the different possibilities as a cause for her symptoms, as well as the rationale for repeating neurophysiologic studies, obtaining MRI of the cervical spine, as well as blood work. I will follow up afterwards.    - REFERRAL TO NEURODIAGNOSTICS (EEG,EP,EMG/NCS/DBS) Modality Requested: EMG/NCS-Comment Extremities  - MR-CERVICAL SPINE-WITH & W/O; Future  - gabapentin (NEURONTIN) 300 MG Cap; 1 tab at bed for 1 week, then 2 tab at bed for 1 week, then 3 tab at bed for 1 week, then 4 tab qhs.  Dispense: 120 Cap; Refill: 1  - WESTERGREN SED RATE; Future  - TSH; Future  - VITAMIN B12; Future  - FOLATE; Future  - SPEP W/REFLEX TO MARCY, A, G, M; Future  - MAG ANTIBODY SERUM; Future  - ASIALOGM1 ANTIBODY TRISTON; Future  - ANTITHYROGLOBULIN AB; Future  - SJOGREN'S AB, ANTI-SS-A/-SS-B  - GIL ANTIBODY WITH REFLEX; Future  - ANTI-NEUTROPHIL CYTOPLASMIC AB W/RFLX; Future  - HEAVY METALS BLOOD; Future  - VITAMIN E; Future  - RENAL FUNCTION PANEL; Future    Time: Evaluation of 60 minutes for exam, review, discussion, and education  Discussion: As mentioned in the assessment, over 60% of the time spent face-to-face counseling and coronary care

## 2018-07-12 LAB
ANCA IGG TITR SER IF: NORMAL {TITER}
ENA SS-B IGG SER IA-ACNC: 0 AU/ML (ref 0–40)
GD1A GANGL AB SER IA-ACNC: 3 IV (ref 0–50)
GD1B GANGL AB SER IA-ACNC: 5 IV (ref 0–50)
GM1 ASIALO AB SER IA-ACNC: NORMAL (ref 0–50)
GM1 GANGL IGG+IGM SER QL IA: 3 IV (ref 0–50)
GM2 GANGL IGG+IGM SER QL IA: 8 IV (ref 0–50)
GQ1B GANGL AB SER-ACNC: 2 IV (ref 0–50)
MAG IGM SER IA-ACNC: 25 TU (ref 0–999)
NUCLEAR IGG SER QL IA: NORMAL
SSA52 R0ENA AB IGG Q0420: 11 AU/ML (ref 0–40)
SSA60 R0ENA AB IGG Q0419: 1 AU/ML (ref 0–40)
THYROGLOB AB SERPL-ACNC: <0.9 IU/ML (ref 0–4)

## 2018-07-13 LAB
A-TOCOPHEROL VIT E SERPL-MCNC: 8.9 MG/L (ref 5.5–18)
ALBUMIN SERPL-MCNC: 4.35 G/DL (ref 3.75–5.01)
ALPHA1 GLOB SERPL ELPH-MCNC: 0.29 G/DL (ref 0.19–0.46)
ALPHA2 GLOB SERPL ELPH-MCNC: 0.74 G/DL (ref 0.48–1.05)
ARSENIC BLD-MCNC: <10 UG/L (ref 0–13)
B-GLOBULIN SERPL ELPH-MCNC: 0.88 G/DL (ref 0.48–1.1)
BETA+GAMMA TOCOPHEROL SERPL-MCNC: 1.6 MG/L (ref 0–6)
CADMIUM BLD-MCNC: <1 UG/L (ref 0–5)
GAMMA GLOB SERPL ELPH-MCNC: 1.04 G/DL (ref 0.62–1.51)
INTERPRETATION SERPL IFE-IMP: NORMAL
LEAD BLDV-MCNC: <2 UG/DL (ref 0–4.9)
MERCURY BLD-MCNC: <3 UG/L (ref 0–10)
MONOCLON BAND OBS SERPL: NORMAL
PATHOLOGY STUDY: NORMAL
PROT SERPL-MCNC: 7.3 G/DL (ref 6–8.3)

## 2018-07-21 ENCOUNTER — HOSPITAL ENCOUNTER (OUTPATIENT)
Dept: RADIOLOGY | Facility: MEDICAL CENTER | Age: 33
End: 2018-07-21
Attending: PSYCHIATRY & NEUROLOGY
Payer: OTHER MISCELLANEOUS

## 2018-07-21 DIAGNOSIS — G62.9 POLYNEUROPATHY: ICD-10-CM

## 2018-07-21 PROCEDURE — A9585 GADOBUTROL INJECTION: HCPCS | Performed by: PSYCHIATRY & NEUROLOGY

## 2018-07-21 PROCEDURE — 72156 MRI NECK SPINE W/O & W/DYE: CPT

## 2018-07-21 PROCEDURE — 700117 HCHG RX CONTRAST REV CODE 255: Performed by: PSYCHIATRY & NEUROLOGY

## 2018-07-21 RX ORDER — GADOBUTROL 604.72 MG/ML
9 INJECTION INTRAVENOUS ONCE
Status: COMPLETED | OUTPATIENT
Start: 2018-07-21 | End: 2018-07-21

## 2018-07-21 RX ADMIN — GADOBUTROL 9 ML: 604.72 INJECTION INTRAVENOUS at 15:06

## 2018-09-26 ENCOUNTER — OFFICE VISIT (OUTPATIENT)
Dept: URGENT CARE | Facility: CLINIC | Age: 33
End: 2018-09-26
Payer: OTHER MISCELLANEOUS

## 2018-09-26 VITALS
OXYGEN SATURATION: 98 % | WEIGHT: 201.2 LBS | DIASTOLIC BLOOD PRESSURE: 86 MMHG | RESPIRATION RATE: 16 BRPM | HEART RATE: 82 BPM | HEIGHT: 62 IN | BODY MASS INDEX: 37.02 KG/M2 | SYSTOLIC BLOOD PRESSURE: 118 MMHG | TEMPERATURE: 98.6 F

## 2018-09-26 DIAGNOSIS — J02.0 STREP PHARYNGITIS: ICD-10-CM

## 2018-09-26 LAB
INT CON NEG: NORMAL
INT CON POS: NORMAL
S PYO AG THROAT QL: POSITIVE

## 2018-09-26 PROCEDURE — 99214 OFFICE O/P EST MOD 30 MIN: CPT | Performed by: PHYSICIAN ASSISTANT

## 2018-09-26 PROCEDURE — 87880 STREP A ASSAY W/OPTIC: CPT | Performed by: PHYSICIAN ASSISTANT

## 2018-09-26 RX ORDER — AMOXICILLIN 500 MG/1
500 CAPSULE ORAL 2 TIMES DAILY
Qty: 20 CAP | Refills: 0 | Status: SHIPPED | OUTPATIENT
Start: 2018-09-26 | End: 2018-10-06

## 2018-09-26 ASSESSMENT — ENCOUNTER SYMPTOMS
ABDOMINAL PAIN: 0
SHORTNESS OF BREATH: 0
DIZZINESS: 0
MUSCULOSKELETAL NEGATIVE: 1
SWOLLEN GLANDS: 1
CHILLS: 0
FEVER: 0
NAUSEA: 0
DIARRHEA: 0
VOMITING: 0
TROUBLE SWALLOWING: 1

## 2018-09-26 NOTE — LETTER
September 26, 2018         Patient: Jennifer Gee   YOB: 1985   Date of Visit: 9/26/2018           To Whom it May Concern:    Jennifer Gee was seen in my clinic on 9/26/2018. Please excuse her absence from 9/27/18-9/28/18.    If you have any questions or concerns, please don't hesitate to call.        Sincerely,           Shari Brar P.A.-C.  Electronically Signed

## 2018-09-27 DIAGNOSIS — G62.9 POLYNEUROPATHY: ICD-10-CM

## 2018-09-27 RX ORDER — GABAPENTIN 300 MG/1
1200 CAPSULE ORAL
Qty: 120 CAP | Refills: 6 | Status: SHIPPED | OUTPATIENT
Start: 2018-09-27 | End: 2018-12-03

## 2018-09-27 NOTE — PROGRESS NOTES
"Subjective:      Jennifer Gee is a 33 y.o. female who presents with Pharyngitis (x 2 days / )            Pharyngitis    This is a new problem. The current episode started yesterday. The problem has been unchanged. The pain is worse on the right side. There has been no fever. The pain is at a severity of 3/10. The pain is mild. Associated symptoms include swollen glands and trouble swallowing. Pertinent negatives include no abdominal pain, congestion, diarrhea, shortness of breath or vomiting. She has had no exposure to strep or mono. She has tried nothing for the symptoms.       Review of Systems   Constitutional: Negative for chills and fever.   HENT: Positive for trouble swallowing. Negative for congestion.    Respiratory: Negative for shortness of breath.    Cardiovascular: Negative for chest pain.   Gastrointestinal: Negative for abdominal pain, diarrhea, nausea and vomiting.   Genitourinary: Negative.    Musculoskeletal: Negative.    Skin: Negative for rash.   Neurological: Negative for dizziness.        Objective:     /86 (BP Location: Left arm, Patient Position: Sitting, BP Cuff Size: Small adult)   Pulse 82   Temp 37 °C (98.6 °F) (Temporal)   Resp 16   Ht 1.575 m (5' 2\")   Wt 91.3 kg (201 lb 3.2 oz)   SpO2 98%   BMI 36.80 kg/m²      Physical Exam   Constitutional: She is oriented to person, place, and time. She appears well-developed and well-nourished. No distress.   HENT:   Head: Normocephalic and atraumatic.   Eyes: Pupils are equal, round, and reactive to light. Conjunctivae are normal.   Neck: Normal range of motion.   Cardiovascular: Normal rate, regular rhythm and normal heart sounds.    No murmur heard.  Pulmonary/Chest: Effort normal and breath sounds normal. No respiratory distress. She has no wheezes.   Musculoskeletal: Normal range of motion.   Neurological: She is alert and oriented to person, place, and time.   Skin: Skin is warm and dry. She is not diaphoretic. "   Psychiatric: She has a normal mood and affect. Her behavior is normal.   Nursing note and vitals reviewed.         PMH:  has a past medical history of Bladder infection (2012); Borderline personality disorder; Gastritis; Migraine (2014); and Pneumonia (2007).  MEDS:   Current Outpatient Prescriptions:   •  amoxicillin (AMOXIL) 500 MG Cap, Take 1 Cap by mouth 2 times a day for 10 days., Disp: 20 Cap, Rfl: 0  •  metformin (GLUCOPHAGE) 1000 MG tablet, TAKE ONE TABLET BY MOUTH TWICE A DAY WITH MEALS, Disp: 180 Tab, Rfl: 0  •  ibuprofen (MOTRIN) 200 MG Tab, Take 200 mg by mouth every 6 hours as needed., Disp: , Rfl:   •  gabapentin (NEURONTIN) 300 MG Cap, 1 tab at bed for 1 week, then 2 tab at bed for 1 week, then 3 tab at bed for 1 week, then 4 tab qhs., Disp: 120 Cap, Rfl: 1  •  naproxen sodium (ANAPROX) 550 MG tablet, Take 1 Tab by mouth 2 times a day, with meals., Disp: 60 Tab, Rfl: 3  •  gabapentin (NEURONTIN) 100 MG Cap, Take 1 Cap by mouth 3 times a day., Disp: 270 Cap, Rfl: 1  •  lamoTRIgine (LAMICTAL) 100 MG Tab, Take 1 Tab by mouth every day., Disp: 90 Tab, Rfl: 3  •  benzoyl peroxide-erythromycin (BENZAMYCIN) gel, Bid to acne, Disp: 30 g, Rfl: 5  •  albuterol 108 (90 Base) MCG/ACT Aero Soln inhalation aerosol, Inhale 2 Puffs by mouth every 6 hours as needed for Shortness of Breath., Disp: 8.5 g, Rfl: 11  •  clonazepam (KLONOPIN) 0.5 MG TABS, Take 0.25 mg by mouth 2 times a day., Disp: , Rfl:   ALLERGIES: No Known Allergies  SURGHX: History reviewed. No pertinent surgical history.  SOCHX:  reports that she has never smoked. She has never used smokeless tobacco. She reports that she uses drugs, including Inhaled and Marijuana. She reports that she does not drink alcohol.  FH: family history includes Alcohol/Drug in her father; Cancer in her maternal grandfather; Heart Disease in her maternal grandfather; Hyperlipidemia in her mother; Hypertension in her mother.       Assessment/Plan:     1. Strep  pharyngitis  - POCT Rapid Strep A: POSITIVE  - amoxicillin (AMOXIL) 500 MG Cap; Take 1 Cap by mouth 2 times a day for 10 days.  Dispense: 20 Cap; Refill: 0   - Complete full course of antibiotics as prescribed   - Advised to throw away toothbrush and get a new one 2-3 days after initiation of treatment  - Advised she is contagious for 24 hours after initiating treatment  - Call or return to office if symptoms persist or worsen. The patient demonstrated a good understanding and agreed with the treatment plan.

## 2018-11-21 ENCOUNTER — OFFICE VISIT (OUTPATIENT)
Dept: NEUROLOGY | Facility: MEDICAL CENTER | Age: 33
End: 2018-11-21
Payer: OTHER MISCELLANEOUS

## 2018-11-21 ENCOUNTER — NON-PROVIDER VISIT (OUTPATIENT)
Dept: NEUROLOGY | Facility: MEDICAL CENTER | Age: 33
End: 2018-11-21
Payer: OTHER MISCELLANEOUS

## 2018-11-21 VITALS
RESPIRATION RATE: 16 BRPM | DIASTOLIC BLOOD PRESSURE: 64 MMHG | HEART RATE: 83 BPM | OXYGEN SATURATION: 94 % | TEMPERATURE: 97.1 F | BODY MASS INDEX: 36.07 KG/M2 | SYSTOLIC BLOOD PRESSURE: 120 MMHG | WEIGHT: 196 LBS | HEIGHT: 62 IN

## 2018-11-21 DIAGNOSIS — G61.81 CIDP (CHRONIC INFLAMMATORY DEMYELINATING POLYNEUROPATHY) (HCC): Primary | ICD-10-CM

## 2018-11-21 DIAGNOSIS — G62.9 POLYNEUROPATHY: ICD-10-CM

## 2018-11-21 PROCEDURE — 95910 NRV CNDJ TEST 7-8 STUDIES: CPT | Performed by: PSYCHIATRY & NEUROLOGY

## 2018-11-21 PROCEDURE — 99215 OFFICE O/P EST HI 40 MIN: CPT | Performed by: PSYCHIATRY & NEUROLOGY

## 2018-11-21 PROCEDURE — 95886 MUSC TEST DONE W/N TEST COMP: CPT | Performed by: PSYCHIATRY & NEUROLOGY

## 2018-11-21 RX ORDER — OMEPRAZOLE 20 MG/1
20 CAPSULE, DELAYED RELEASE ORAL DAILY
COMMUNITY
End: 2019-03-14 | Stop reason: SDUPTHER

## 2018-11-21 RX ORDER — MEXILETINE HYDROCHLORIDE 150 MG/1
150 CAPSULE ORAL 3 TIMES DAILY
Qty: 90 CAP | Refills: 1 | Status: SHIPPED | OUTPATIENT
Start: 2018-11-21 | End: 2018-12-03

## 2018-11-21 ASSESSMENT — ENCOUNTER SYMPTOMS
DEPRESSION: 0
FOCAL WEAKNESS: 1
SENSORY CHANGE: 1
FALLS: 0
TREMORS: 0
MEMORY LOSS: 0
TINGLING: 1

## 2018-11-21 ASSESSMENT — PAIN SCALES - GENERAL: PAINLEVEL: 8=MODERATE-SEVERE PAIN

## 2018-11-21 NOTE — PROCEDURES
NERVE CONDUCTION STUDIES AND ELECTROMYOGRAPHY REPORT        DOS: 11/21/2018      Referring provider: Patrick Ayala MD.       SUMMARY OF PATIENT'S CLINICAL HISTORY,PHYSICAL EXAM, AND RATIONALE FOR TESTING:    Ms. Jennifer Gee 33 y.o. presenting with progressive paresthesias. There is a family history of polyneuropathy. Patient's mother report patient always having problems walking since childhood. Bilateral Pes Cavus. Studies for work up and characterization of possible polyneuropathy.       ELECTRODIAGNOSTIC EXAMINATION:  Nerve conduction studies (NCS) and electromyography (EMG) are utilized to evaluate direct or indirect damage to the peripheral nervous system. NCS are performed to measure the nerve(s) response(s) to electrostimulation across a given nerve segment. EMG evaluates the passive and active electrical activity of the muscle(s) in question.  Muscles are innervated by specific peripheral nerves and roots. Often times, several nerves the muscle to be examined in order to determine the presence or absence of the disease process. Furthermore, nerves and muscles may need to be tested in a [id number] comparison, as well as in additional extremities, as this may be crucial in characterizing the extent of the disease process, which may be diffuse or isolated and of varying degree of severity. The extent of the neurodiagnostic exam is justified as it may help arrive to a proper diagnosis, which ultimately may contribute to better management of the patient. Therefore, the nerves to muscles examined during the study were medically necessary.    Unless otherwise noted, temperature of the extremity(s) study was monitored before and during the examination and remained between 32 and 36 degrees C for the upper extremities, and between 30 and 36 degrees C for the lower extremities.      NERVE CONDUCTION STUDIES:  Sensory nerves:   - Right Median sensory nerve was examined. No response could be obtained.   -  Right Ulnar sensory nerve was examined. No response could be obtained.   - Bilateral Sural nerves were tested. No responses could be obtained.     Motor nerves:   - Right Median motor nerve was examined. Recording electrode placed at the Abductor Pollicis Brevis muscle. There was severely prolonged latency and a severely decreased conduction velocity. There was moderately decreased compound motor action potential amplitude.   - Right Ulnar motor nerve was examined. Recording electrode placed at the Abductor Digiti Minimi muscle. There was severely prolonged latency and a severely decreased conduction velocity. There was moderately decreased compound motor action potential amplitude.   - Right Tibial nerve was examined. Recording electrode placed at the Abductor Hallucis muscle. No response could be obtained.   - Right Deep Peroneal motor nerve was examined. Recording electrode were placed at the Extensor Digitorum Brevis muscle. No response could be obtained.     No conduction block observed in any of the motor nerves examined.      LATE RESPONSES:  F waves were assessed in the following nerves: Right median and right ulnar.   No response in the right median F wave.   Very prolonged latency of the right ulnar F wave.       ELECTROMYOGRAPHY:  The study was performed the concentric needle electrode. Fibrillation and fasciculation activity is graded by convention from none (0) to continuous (4+).  Needle electrode examination was performed in the following muscles: right vastus lateralis, right tibialis anterior, right medial gastrocnemius, right extensor digitorum brevis, and right abductor hallucis.   The muscles tested demonstrated normal inserctional activity, but had chronic motor units and decreased recruitment. There were no elements suggestive of active denervation.    The patient tolerated testing well, without any complications.         Nerve Conduction Studies     Stim Site NR Peak (ms) Norm Peak (ms) O-P  Amp (µV) Norm O-P Amp Site1 Site2 Delta-P (ms) Dist (cm) Clark (m/s) Norm Clark (m/s)   Right Median Anti Sensory (2nd Digit)  35.3°C   Wrist *NR  <3.4  >20 Wrist 2nd Digit  14.0  >50   Left Sural Anti Sensory (Lat Mall)  35.3°C   Calf *NR    >5 Calf Lat Mall  14.0  >40   Right Sural Anti Sensory (Lat Mall)  35.3°C   Calf *NR    >5 Calf Lat Mall  14.0  >40   Right Ulnar Anti Sensory (5th Digit)  35.3°C   Wrist *NR  <3.1  >12 Wrist 5th Digit  14.0  >50        Stim Site NR Onset (ms) Norm Onset (ms) O-P Amp (mV) Norm O-P Amp Site1 Site2 Delta-0 (ms) Dist (cm) Clark (m/s) Norm Clark (m/s)   Right Median Motor (Abd Poll Brev)  35.3°C   Wrist    *9.9 <3.9 *3.8 >6 Elbow Wrist 8.8 21.0 *24 >50   Elbow    18.7  2.4          Right Peroneal EDB Motor (Ext Dig Brev)  35.3°C   Ankle *NR  <5.5  >3.0 B Fib Ankle  0.0  >40   B Fib *NR     Poplt B Fib  0.0     Poplt *NR             Right Tibial Motor (Abd Zhu Brev)  35.3°C   Ankle *NR  <6  >8 Knee Ankle  0.0  >40   Knee *NR             Right Ulnar Motor (Abd Dig Min)  35.3°C   Wrist    *8.0 <3.1 *4.4 >7 B Elbow Wrist 5.6 14.0 *25 >50   B Elbow    13.6  2.7  A Elbow B Elbow 3.7 10.0 27    A Elbow    17.3  2.1            F Wave Studies     NR F-Lat (ms) Lat Norm (ms)   Right Median (Abd Poll Brev)  35.3°C   *NR  <31   Right Ulnar (Abd Dig Min)  35.3°C      *42.88 <32                               Electromyography     Side Muscle Nerve Root Ins Act Fibs Psw Amp Dur Poly Recrt Int Pat Comment   Right VastusLat Femoral L2-4 Nml Nml Nml Nml Nml 0 *Reduced Nml    Right AntTibialis Dp Br Fibular L4-5 Nml Nml Nml *Incr Nml 0 *Reduced Nml    Right Gastroc Tibial S1-2 Nml Nml Nml Nml Nml 0 *Reduced Nml    Right Ext Dig Brev Dp Br Fibular L5, S1 Nml Nml Nml Nml Nml 0 *Reduced Nml    Right AbdHallucis MedPlantar S1-2 Nml Nml Nml *Incr Nml 0 *Reduced Nml REINERVATION UNITS          DIAGNOSTIC INTERPRETATION:   Extensive electrodiagnostic studies were performed to the right upper and right lower  extremities, and to a limited degree to the left lower extremity, for comparison purposes. The studies were ABNORMAL.      DISCUSSION:   1)-Widespread, very SEVERE, sensory and motor inherited or congenital demyelinating polyneuropathy, such as Charcot Michelle Tooth disease.     Findings discussed with Dr. Ayala.     The patient has been instructed to follow up with Dr. Ayala for further care.     Dat Carroll MD  Medical Director, Epilepsy and Neurodiagnostics.   Clinical  of Neurology [facility].   Diplomate in Neurology, Epilepsy, and Electrodiagnostic Medicine.   Office: [phone]  Fax: [phone]

## 2018-11-22 NOTE — PROGRESS NOTES
"Subjective:      Jennifer Gee is a 33 y.o. female who presents with her mother for follow-up, with a history of progressive polyneuropathy.     HPI    Since last seen, Jennifer states that the symptoms have continued to worsen.  The weakness in the legs to a lesser degree, it is mostly the sensory distortions that are driving her crazy.  Gabapentin 1200 mg nightly is no longer providing much benefit.  Walking is more problematic.    Her mother chimed in that the patient's symptoms actually began when she was much younger, and stated that not only did she herself have pes cavus, she also underwent surgery, her mother's twin sister had the same as did 2 of her daughters, all of whom were diagnosed with some type of neuropathic process resulting in progressive weakness and sensory loss.    MRI of the cervical spine was done, revealing enhancement of multiple nerve roots bilaterally without any abnormal cord signal or evidence of myelitis, consistent with possible CIDP.  EMG/NCV studies done earlier today revealed severe sensorimotor demyelinating polyneuropathy consistent with possible CMT versus an aggressive demyelinating inflammatory neuropathy.    Medical, surgical and family histories are reviewed in the electronic health record, there are no new drug allergies.  She is on gabapentin 1200 mg nightly, Lamictal 100 mg daily, Glucophage, Prilosec and Klonopin.    Review of Systems   Musculoskeletal: Negative for falls.   Neurological: Positive for tingling, sensory change and focal weakness. Negative for tremors.   Psychiatric/Behavioral: Negative for depression and memory loss.   All other systems reviewed and are negative.       Objective:     /64 (BP Location: Right arm, Patient Position: Sitting)   Pulse 83   Temp 36.2 °C (97.1 °F) (Temporal)   Resp 16   Ht 1.575 m (5' 2\")   Wt 88.9 kg (196 lb)   SpO2 94%   BMI 35.85 kg/m²      Physical Exam    She appears in no acute distress.  Vital signs are " stable.  There is no malar rash.  The neck is supple, range of motion is full.  Cardiac evaluation is unremarkable.  Straight leg raising is negative bilaterally.  Pes cavus is unchanged.    Cognition is intact, cranial nerve exam also reveals no evidence of deficits.    Musculoskeletal exam reveals near full strength throughout, there may be some weakness with hand intrinsic muscles and with dorsiflexion bilaterally.  Reflexes are absent in the lower extremities, trace present at the biceps bilaterally.  Both toes are equivocal on plantar stimulation.    Repetitive movements are slowed with the feet, there is no appendicular dystaxia.  Station is widened, she looks at the ground frequently when she walks.    Sensory exam reveals a stocking and glove pattern loss of both large and small fiber functions, essentially unchanged from the pattern seen 4 months ago.     Assessment/Plan:     1. CIDP (chronic inflammatory demyelinating polyneuropathy) (HCC)  Though this probably will turn out to be a hereditary demyelinating polyneuropathy, likely Charcot-Michelle-Tooth, the abnormalities on MRI cannot be easily explained on the basis of a genetic degenerative process.  Thus, CSF studies as well as genetic screens will be most important, especially because CIDP is a treatable disease.  Still, there is also a very strong family history of multiple members apparently suffered from very similar symptoms.    Face-to-face time spent reviewing all of the above.  I talk with the patient and her mother about the nature of her genetically based process and its time course, versus and autoimmune, acquired process that would be amenable to treatment to slow the progression down.    I discussed the case at length with Dr. Carroll, who feels this is more likely a genetically based process.  Treatment will be held pending results of CSF analysis and genetic screens.    Symptomatically, gabapentin will be discontinued and symptoms observe for  the next week to see if there is any change.  If none, Mexitil 150 mg, twice daily, will be started as a substitute, if symptoms worsen off gabapentin, the drug will be reintroduced but at a higher dose, Mexitil can be held.  Side effects of Mexitil were reviewed.  We will call her with the test results as they come in, especially if IVIG or other immunosuppressive treatments are indicated.  Otherwise a 4-month follow-up is scheduled.    Time: 40 minutes spent face-to-face for exam, review, discussion, and education, of this over 50% of the time spent counseling and coordinating care

## 2018-12-03 ENCOUNTER — OFFICE VISIT (OUTPATIENT)
Dept: MEDICAL GROUP | Facility: CLINIC | Age: 33
End: 2018-12-03
Payer: OTHER MISCELLANEOUS

## 2018-12-03 ENCOUNTER — TELEPHONE (OUTPATIENT)
Dept: NEUROLOGY | Facility: MEDICAL CENTER | Age: 33
End: 2018-12-03

## 2018-12-03 VITALS
OXYGEN SATURATION: 97 % | TEMPERATURE: 98.5 F | DIASTOLIC BLOOD PRESSURE: 82 MMHG | RESPIRATION RATE: 18 BRPM | HEIGHT: 62 IN | BODY MASS INDEX: 35.7 KG/M2 | HEART RATE: 93 BPM | SYSTOLIC BLOOD PRESSURE: 124 MMHG | WEIGHT: 194 LBS

## 2018-12-03 DIAGNOSIS — G61.81 CIDP (CHRONIC INFLAMMATORY DEMYELINATING POLYNEUROPATHY) (HCC): ICD-10-CM

## 2018-12-03 DIAGNOSIS — E11.9 TYPE 2 DIABETES MELLITUS WITHOUT COMPLICATION, WITHOUT LONG-TERM CURRENT USE OF INSULIN (HCC): ICD-10-CM

## 2018-12-03 PROCEDURE — 99213 OFFICE O/P EST LOW 20 MIN: CPT | Performed by: PHYSICIAN ASSISTANT

## 2018-12-03 RX ORDER — DULOXETIN HYDROCHLORIDE 30 MG/1
CAPSULE, DELAYED RELEASE ORAL
COMMUNITY
Start: 2018-11-28 | End: 2019-03-06

## 2018-12-03 RX ORDER — TRAZODONE HYDROCHLORIDE 50 MG/1
TABLET ORAL
COMMUNITY
Start: 2018-11-26 | End: 2019-09-23

## 2018-12-04 NOTE — PROGRESS NOTES
Chief Complaint   Patient presents with   • Medication Refill     Metformin        HISTORY OF PRESENT ILLNESS: Patient is a 33 y.o. female established patient who presents today for evaluation and management of:    CIDP (chronic inflammatory demyelinating polyneuropathy) ([facility])  Likely genetic, this is a chronic condition that seems to have gotten worse recently.  The patient has recently been changed from gabapentin to Mexitil 150 mg, twice daily in hopes of alleviating her bilateral upper and lower extremity pain and numbness.  She is aware that this will progress quickly as she has a first cousin, the daughter of her mother's twin sister, who is affected by this disease.         Patient Active Problem List    Diagnosis Date Noted   • CIDP (chronic inflammatory demyelinating polyneuropathy) ([facility]) 03/06/2018   • Chronic use of benzodiazepine for therapeutic purpose 03/06/2018   • Nonspecific abnormal electromyogram (EMG) 03/06/2018   • Obesity (BMI 35.0-39.9 without comorbidity) ([facility]) 03/06/2018   • Mild intermittent asthma without complication 01/22/2018   • Rash of back 10/12/2017   • Acute recurrent sinusitis 09/21/2017   • Bipolar affective disorder ([facility]) 02/16/2016   • PCOS (polycystic ovarian syndrome) 02/16/2016   • Acne vulgaris 02/16/2016       Allergies:Patient has no known allergies.    Current Outpatient Prescriptions   Medication Sig Dispense Refill   • Semaglutide 0.25 or 0.5 MG/DOSE Solution Pen-injector Inject 0.25 mg as instructed every 7 days. Inject 0.25 mg as instructed for 4 weeks then, 0.5 mg thereafter 3 PEN 0   • DULoxetine (CYMBALTA) 30 MG Cap DR Particles      • traZODone (DESYREL) 50 MG Tab      • omeprazole (PRILOSEC) 20 MG delayed-release capsule Take 20 mg by mouth every day.     • metformin (GLUCOPHAGE) 1000 MG tablet TAKE ONE TABLET BY MOUTH TWICE A DAY WITH MEALS 180 Tab 0   • naproxen sodium (ANAPROX) 550 MG tablet Take 1 Tab by mouth 2 times a day, with meals. 60 Tab 3   •  "lamoTRIgine (LAMICTAL) 100 MG Tab Take 1 Tab by mouth every day. 90 Tab 3   • benzoyl peroxide-erythromycin (BENZAMYCIN) gel Bid to acne 30 g 5   • albuterol 108 (90 Base) MCG/ACT Aero Soln inhalation aerosol Inhale 2 Puffs by mouth every 6 hours as needed for Shortness of Breath. 8.5 g 11   • clonazepam (KLONOPIN) 0.5 MG TABS Take 0.25 mg by mouth 2 times a day.       No current facility-administered medications for this visit.        Social History   Substance Use Topics   • Smoking status: Never Smoker   • Smokeless tobacco: Never Used   • Alcohol use No       Family Status   Relation Status   • Mo Alive   • Fa    • MGFa (Not Specified)   • Neg Hx (Not Specified)     Family History   Problem Relation Age of Onset   • Hyperlipidemia Mother    • Hypertension Mother    • Alcohol/Drug Father    • Heart Disease Maternal Grandfather    • Cancer Maternal Grandfather         Colon   • Stroke Neg Hx    • Diabetes Neg Hx        Review of Systems: See HPI above.   Constitutional: Negative for fever, chills, weight loss and positive for malaise.   Respiratory: Negative for shortness of breath  Cardiovascular: Negative for chest pain  Musculoskeletal: positive for myalgias, back pain and joint pain.   Skin: Negative for rash and itching.   Neurological: Positive for dizziness, tingling, tremors, sensory change, focal weakness and headaches.   Endo/Heme/Allergies: Does not bruise/bleed easily.   Psychiatric/Behavioral: Positive for well-controlled depression, without suicidal ideas and memory loss.  The patient is occasionally nervous/anxious and does have poorly controlled, pain induced insomnia.      Exam:  Blood pressure 124/82, pulse 93, temperature 36.9 °C (98.5 °F), resp. rate 18, height 1.575 m (5' 2\"), weight 88 kg (194 lb), SpO2 97 %, not currently breastfeeding.  Body mass index is 35.48 kg/m².  General:  Obese female in NAD  Head: is grossly normal.  Neck: Supple without masses. Thyroid is not visibly " enlarged.  Pulmonary: Clear to ausculation. Normal effort. No rales, ronchi, or wheezing.  Cardiovascular: Regular rate and rhythm without murmur. Carotid pulses are intact and equal bilaterally.  Extremities: no clubbing, cyanosis, or edema.  Abdominal: Protuberant abdomen, soft    Medical decision-making and discussion:  1. Type 2 diabetes mellitus without complication, without long-term current use of insulin ([facility])    - Semaglutide 0.25 or 0.5 MG/DOSE Solution Pen-injector; Inject 0.25 mg as instructed every 7 days. Inject 0.25 mg as instructed for 4 weeks then, 0.5 mg thereafter  Dispense: 3 PEN; Refill: 0    2. CIDP (chronic inflammatory demyelinating polyneuropathy) ([facility])  Continue follow-up with Dr. Culp      Please note that this dictation was created using voice recognition software. I have made every reasonable attempt to correct obvious errors, but I expect that there are errors of grammar and possibly content that I did not discover before finalizing the note.      Return in about 4 weeks (around 12/31/2018) for medication check.

## 2018-12-04 NOTE — ASSESSMENT & PLAN NOTE
Likely genetic, this is a chronic condition that seems to have gotten worse recently.  The patient has recently been changed from gabapentin to Mexitil 150 mg, twice daily in hopes of alleviating her bilateral upper and lower extremity pain and numbness.  She is aware that this will progress quickly as she has a first cousin, the daughter of her mother's twin sister, who is affected by this disease.

## 2018-12-04 NOTE — TELEPHONE ENCOUNTER
----- Message from Gayatri Berry sent at 12/3/2018  7:39 AM PST -----  Regarding: LUMBAR PUNCTURE PATIENT NEEDS LAB ORDERS AND FLUID TEST ORDER  Please put STAT CBC-PT/INR- AND PTT LAB ORDERS IN [facility]. SHE ALSO NEEDS FLUID TESTING ORDERS PUT IN AS WELL    THANK YOU

## 2018-12-06 ENCOUNTER — PATIENT MESSAGE (OUTPATIENT)
Dept: MEDICAL GROUP | Facility: CLINIC | Age: 33
End: 2018-12-06

## 2018-12-06 DIAGNOSIS — E28.2 PCOS (POLYCYSTIC OVARIAN SYNDROME): ICD-10-CM

## 2018-12-10 NOTE — TELEPHONE ENCOUNTER
From: Jennifer Gee  To: Mari Cordova P.A.-C.  Sent: 12/6/2018 2:55 PM PST  Subject: Prescription Question    Hi! My insurance is wanting more information on the pen or for you to switch it to a different pen. When you get a chance can you please call them and tell them what’s what. Hope you have a great day!

## 2018-12-12 DIAGNOSIS — E66.9 OBESITY (BMI 35.0-39.9 WITHOUT COMORBIDITY): ICD-10-CM

## 2018-12-12 DIAGNOSIS — E11.8 TYPE 2 DIABETES MELLITUS WITH COMPLICATION, WITHOUT LONG-TERM CURRENT USE OF INSULIN (HCC): ICD-10-CM

## 2018-12-12 DIAGNOSIS — E28.2 PCOS (POLYCYSTIC OVARIAN SYNDROME): ICD-10-CM

## 2019-01-07 ENCOUNTER — OFFICE VISIT (OUTPATIENT)
Dept: MEDICAL GROUP | Facility: CLINIC | Age: 34
End: 2019-01-07
Payer: COMMERCIAL

## 2019-01-07 VITALS
HEART RATE: 96 BPM | HEIGHT: 62 IN | RESPIRATION RATE: 14 BRPM | BODY MASS INDEX: 36.22 KG/M2 | DIASTOLIC BLOOD PRESSURE: 90 MMHG | OXYGEN SATURATION: 98 % | WEIGHT: 196.8 LBS | SYSTOLIC BLOOD PRESSURE: 126 MMHG | TEMPERATURE: 98.2 F

## 2019-01-07 DIAGNOSIS — G47.00 FREQUENT NOCTURNAL AWAKENING: ICD-10-CM

## 2019-01-07 DIAGNOSIS — E11.8 TYPE 2 DIABETES MELLITUS WITH COMPLICATION, WITHOUT LONG-TERM CURRENT USE OF INSULIN (HCC): ICD-10-CM

## 2019-01-07 DIAGNOSIS — E28.2 PCOS (POLYCYSTIC OVARIAN SYNDROME): ICD-10-CM

## 2019-01-07 PROCEDURE — 99214 OFFICE O/P EST MOD 30 MIN: CPT | Performed by: PHYSICIAN ASSISTANT

## 2019-01-08 PROBLEM — G47.00 FREQUENT NOCTURNAL AWAKENING: Status: ACTIVE | Noted: 2019-01-08

## 2019-01-08 NOTE — PROGRESS NOTES
Chief Complaint   Patient presents with   • Sleep Problem   • Weight Loss       HISTORY OF PRESENT ILLNESS: Patient is a 33 y.o. female established patient who presents today for evaluation and management of:    PCOS (polycystic ovarian syndrome)  Patient has been taking metformin recently and since starting this, she has steadily lost weight or at least maintained the same weight. She was started on metformin due to the weight gain from PCOS by her gynecologist. She is requesting refills today.     Type 2 diabetes mellitus with complication, without long-term current use of insulin ([facility])  Last A1c: ordered test.   DM Medications: metformin, trulicity Patient reports good medication compliance.   HTN: Blood pressure goal <140/<90 Yes  ACE: none.   Hyperlipidemia: Cholesterol goal LDL <100 Yes.   Currently Rx Statin: none.   Diabetic diet: No  Exercise: none.   Kidney function: test ordered.   Microalbumin screening: test ordered.   Has patient received flu vaccine: No  Has patient received Hep B series:No    A1c goal <7 Yes  Current barriers to control include none.   Hypoglycemic episodes No  Diabetic complications: peripheral neuropathy    The patient is not taking ASA every day and is taking all other medications as prescribed. Patient has had severe constipation with use of trulicity.       Frequent nocturnal awakening  patient states she awakens multiple times each evening and has worn her sheets to thread bare due to kicking her legs at night. She states she rarely feels rested throughout the day and is wondering if she is not breathing well. She has no witness apneic events.        Patient Active Problem List    Diagnosis Date Noted   • Frequent nocturnal awakening 01/08/2019   • Type 2 diabetes mellitus with complication, without long-term current use of insulin ([facility]) 12/12/2018   • CIDP (chronic inflammatory demyelinating polyneuropathy) ([facility]) 03/06/2018   • Chronic use of benzodiazepine for therapeutic  purpose 2018   • Nonspecific abnormal electromyogram (EMG) 2018   • Obesity (BMI 35.0-39.9 without comorbidity) ([facility]) 2018   • Mild intermittent asthma without complication 2018   • Rash of back 10/12/2017   • Acute recurrent sinusitis 2017   • Bipolar affective disorder ([facility]) 2016   • PCOS (polycystic ovarian syndrome) 2016   • Acne vulgaris 2016       Allergies:Patient has no known allergies.    Current Outpatient Prescriptions   Medication Sig Dispense Refill   • Semaglutide 0.25 or 0.5 MG/DOSE Solution Pen-injector Inject 0.5 mg as instructed every 7 days. 3 PEN 4   • metformin (GLUCOPHAGE) 1000 MG tablet Take 1 Tab by mouth 2 times a day, with meals. 180 Tab 0   • DULoxetine (CYMBALTA) 30 MG Cap DR Particles      • traZODone (DESYREL) 50 MG Tab      • omeprazole (PRILOSEC) 20 MG delayed-release capsule Take 20 mg by mouth every day.     • naproxen sodium (ANAPROX) 550 MG tablet Take 1 Tab by mouth 2 times a day, with meals. 60 Tab 3   • lamoTRIgine (LAMICTAL) 100 MG Tab Take 1 Tab by mouth every day. 90 Tab 3   • benzoyl peroxide-erythromycin (BENZAMYCIN) gel Bid to acne 30 g 5   • albuterol 108 (90 Base) MCG/ACT Aero Soln inhalation aerosol Inhale 2 Puffs by mouth every 6 hours as needed for Shortness of Breath. 8.5 g 11   • clonazepam (KLONOPIN) 0.5 MG TABS Take 0.25 mg by mouth 2 times a day.       No current facility-administered medications for this visit.        Social History   Substance Use Topics   • Smoking status: Never Smoker   • Smokeless tobacco: Never Used   • Alcohol use No       Family Status   Relation Status   • Mo Alive   • Fa    • MGFa (Not Specified)   • Neg Hx (Not Specified)     Family History   Problem Relation Age of Onset   • Hyperlipidemia Mother    • Hypertension Mother    • Alcohol/Drug Father    • Heart Disease Maternal Grandfather    • Cancer Maternal Grandfather         Colon   • Stroke Neg Hx    • Diabetes Neg Hx   "      Review of Systems: See HPI above.   Constitutional: Negative for fever, chills, weight loss and malaise.   HENT: Negative for ear pain, nosebleeds, congestion, sore throat and neck pain.    Eyes: Negative for blurred vision.   Respiratory: Negative for shortness of breath, cough, sputum production and wheezing.    Cardiovascular: Negative for chest pain, palpitations, orthopnea and leg swelling.   Gastrointestinal: Negative for heartburn, nausea, vomiting and abdominal pain.   Skin: Negative for rash and itching.   Neurological: Negative for dizziness, tremors, sensory change, and headaches.   Endo/Heme/Allergies: Does not bruise/bleed easily.   Psychiatric/Behavioral: Negative for depression, suicidal ideas and memory loss.  The patient is not nervous/anxious and does not have insomnia.      Exam:  Blood pressure 126/90, pulse 96, temperature 36.8 °C (98.2 °F), temperature source Temporal, resp. rate 14, height 1.575 m (5' 2\"), weight 89.3 kg (196 lb 12.8 oz), SpO2 98 %, not currently breastfeeding.  Body mass index is 36 kg/m².  General:  Obese female in NAD  Head: is grossly normal.  Neck: Supple without masses. Thyroid is not visibly enlarged.  Pulmonary: Clear to ausculation. Normal effort. No rales, ronchi, or wheezing.  Cardiovascular: Regular rate and rhythm without murmur. Carotid pulses are intact and equal bilaterally.  Extremities: no clubbing, cyanosis, or edema.    Medical decision-making and discussion:  1. Type 2 diabetes mellitus with complication, without long-term current use of insulin (HCC)  The following is a new medication due to failure of trulicity.   - Semaglutide 0.25 or 0.5 MG/DOSE Solution Pen-injector; Inject 0.5 mg as instructed every 7 days.  Dispense: 3 PEN; Refill: 4  - COMP METABOLIC PANEL; Future  - HEMOGLOBIN A1C; Future  - Lipid Profile; Future    2. PCOS (polycystic ovarian syndrome)    - metformin (GLUCOPHAGE) 1000 MG tablet; Take 1 Tab by mouth 2 times a day, with " meals.  Dispense: 180 Tab; Refill: 0    3. Frequent nocturnal awakening    - REFERRAL TO SLEEP STUDIES      Please note that this dictation was created using voice recognition software. I have made every reasonable attempt to correct obvious errors, but I expect that there are errors of grammar and possibly content that I did not discover before finalizing the note.      Return in about 4 weeks (around 2/4/2019) for DM.

## 2019-01-08 NOTE — ASSESSMENT & PLAN NOTE
Last A1c: ordered test.   DM Medications: metformin, trulicity Patient reports good medication compliance.   HTN: Blood pressure goal <140/<90 Yes  ACE: none.   Hyperlipidemia: Cholesterol goal LDL <100 Yes.   Currently Rx Statin: none.   Diabetic diet: No  Exercise: none.   Kidney function: test ordered.   Microalbumin screening: test ordered.   Has patient received flu vaccine: No  Has patient received Hep B series:No    A1c goal <7 Yes  Current barriers to control include none.   Hypoglycemic episodes No  Diabetic complications: peripheral neuropathy    The patient is not taking ASA every day and is taking all other medications as prescribed. Patient has had severe constipation with use of trulicity.

## 2019-01-08 NOTE — ASSESSMENT & PLAN NOTE
Patient has been taking metformin recently and since starting this, she has steadily lost weight or at least maintained the same weight. She was started on metformin due to the weight gain from PCOS by her gynecologist. She is requesting refills today.

## 2019-01-08 NOTE — ASSESSMENT & PLAN NOTE
patient states she awakens multiple times each evening and has worn her sheets to thread bare due to kicking her legs at night. She states she rarely feels rested throughout the day and is wondering if she is not breathing well. She has no witness apneic events.

## 2019-01-28 ENCOUNTER — OFFICE VISIT (OUTPATIENT)
Dept: URGENT CARE | Facility: CLINIC | Age: 34
End: 2019-01-28
Payer: COMMERCIAL

## 2019-01-28 VITALS
SYSTOLIC BLOOD PRESSURE: 124 MMHG | BODY MASS INDEX: 31.53 KG/M2 | RESPIRATION RATE: 14 BRPM | HEIGHT: 66 IN | TEMPERATURE: 98.6 F | DIASTOLIC BLOOD PRESSURE: 76 MMHG | WEIGHT: 196.2 LBS | OXYGEN SATURATION: 97 % | HEART RATE: 82 BPM

## 2019-01-28 DIAGNOSIS — J02.9 PHARYNGITIS, UNSPECIFIED ETIOLOGY: ICD-10-CM

## 2019-01-28 DIAGNOSIS — J02.9 VIRAL PHARYNGITIS: ICD-10-CM

## 2019-01-28 PROCEDURE — 99213 OFFICE O/P EST LOW 20 MIN: CPT | Performed by: NURSE PRACTITIONER

## 2019-01-28 ASSESSMENT — ENCOUNTER SYMPTOMS
CHILLS: 0
SHORTNESS OF BREATH: 0
SORE THROAT: 1
DIZZINESS: 0
COUGH: 1
HEADACHES: 0
SWOLLEN GLANDS: 1
FEVER: 1
VOMITING: 0
MYALGIAS: 0
TROUBLE SWALLOWING: 1
EYE PAIN: 0
NAUSEA: 0

## 2019-01-29 NOTE — PROGRESS NOTES
"Subjective:   Jennifer Gee is a 33 y.o. female who presents for Pharyngitis (x 2 days / fever / )        Pharyngitis    This is a new problem. Episode onset: 2 days. The problem has been unchanged. Neither side of throat is experiencing more pain than the other. There has been no fever. The pain is at a severity of 5/10. The pain is moderate. Associated symptoms include congestion, coughing, swollen glands and trouble swallowing. Pertinent negatives include no ear discharge, ear pain, headaches, shortness of breath or vomiting. She has had no exposure to strep. She has tried nothing for the symptoms. The treatment provided no relief.     Review of Systems   Constitutional: Positive for fever. Negative for chills.   HENT: Positive for congestion, sore throat and trouble swallowing. Negative for ear discharge and ear pain.    Eyes: Negative for pain.   Respiratory: Positive for cough. Negative for shortness of breath.    Cardiovascular: Negative for chest pain.   Gastrointestinal: Negative for nausea and vomiting.   Genitourinary: Negative for hematuria.   Musculoskeletal: Negative for myalgias.   Skin: Negative for rash.   Neurological: Negative for dizziness and headaches.     No Known Allergies   Objective:   /76 (BP Location: Left arm, Patient Position: Sitting, BP Cuff Size: Large adult)   Pulse 82   Temp 37 °C (98.6 °F) (Temporal)   Resp 14   Ht 1.676 m (5' 6\")   Wt 89 kg (196 lb 3.2 oz)   SpO2 97%   BMI 31.67 kg/m²   Physical Exam   Constitutional: She is oriented to person, place, and time. She appears well-developed and well-nourished. No distress.   HENT:   Head: Normocephalic and atraumatic.   Right Ear: Tympanic membrane normal.   Left Ear: Tympanic membrane normal.   Nose: Nose normal. Right sinus exhibits no maxillary sinus tenderness and no frontal sinus tenderness. Left sinus exhibits no maxillary sinus tenderness and no frontal sinus tenderness.   Mouth/Throat: Uvula is midline and " mucous membranes are normal. Posterior oropharyngeal edema and posterior oropharyngeal erythema present. No tonsillar abscesses. No tonsillar exudate.   Eyes: Pupils are equal, round, and reactive to light. Conjunctivae and EOM are normal. Right eye exhibits no discharge. Left eye exhibits no discharge.   Cardiovascular: Normal rate and regular rhythm.    No murmur heard.  Pulmonary/Chest: Effort normal and breath sounds normal. No respiratory distress.   Abdominal: Soft. She exhibits no distension. There is no tenderness.   Neurological: She is alert and oriented to person, place, and time. She has normal reflexes. No sensory deficit.   Skin: Skin is warm, dry and intact.   Psychiatric: She has a normal mood and affect.         Assessment/Plan:     1. Pharyngitis, unspecified etiology  POCT Rapid Strep A   2. Viral pharyngitis       Strep negative    It was explained to the pt. Today that due to the viral nature of the pt's illness, we will treat symptomatically today. Encouraged OTC supportive meds PRN. Humidification, increase fluids.   Patient given precautionary s/sx that mandate immediate follow up and evaluation in the ED. Advised of risks of not doing so.    DDX, Supportive care, and indications for immediate follow-up discussed with patient.    Instructed to return to clinic or nearest emergency department if we are not available for any change in condition, further concerns, or worsening of symptoms.    The patient demonstrated a good understanding and agreed with the treatment plan.

## 2019-03-01 ENCOUNTER — GYNECOLOGY VISIT (OUTPATIENT)
Dept: OBGYN | Facility: CLINIC | Age: 34
End: 2019-03-01
Payer: COMMERCIAL

## 2019-03-01 ENCOUNTER — HOSPITAL ENCOUNTER (OUTPATIENT)
Facility: MEDICAL CENTER | Age: 34
End: 2019-03-01
Attending: OBSTETRICS & GYNECOLOGY
Payer: COMMERCIAL

## 2019-03-01 VITALS — SYSTOLIC BLOOD PRESSURE: 110 MMHG | DIASTOLIC BLOOD PRESSURE: 70 MMHG | WEIGHT: 196 LBS | BODY MASS INDEX: 31.64 KG/M2

## 2019-03-01 DIAGNOSIS — Z12.4 ROUTINE CERVICAL SMEAR: ICD-10-CM

## 2019-03-01 DIAGNOSIS — Z01.419 WELL WOMAN EXAM: ICD-10-CM

## 2019-03-01 PROCEDURE — 99395 PREV VISIT EST AGE 18-39: CPT | Performed by: OBSTETRICS & GYNECOLOGY

## 2019-03-01 PROCEDURE — 88175 CYTOPATH C/V AUTO FLUID REDO: CPT

## 2019-03-01 PROCEDURE — 87591 N.GONORRHOEAE DNA AMP PROB: CPT

## 2019-03-01 PROCEDURE — 87624 HPV HI-RISK TYP POOLED RSLT: CPT

## 2019-03-01 PROCEDURE — 87491 CHLMYD TRACH DNA AMP PROBE: CPT

## 2019-03-01 NOTE — PROGRESS NOTES
Annual examination;  This patient is a 33 y.o. female No obstetric history on file. using nothing for birth control.  The patient is planning on starting a sexual relationship with her current boyfriend of 2 months.  He does not want birth control.  She has a history of PCO S but recently lost a large amount of weight due to [place]        /70 (BP Location: Left arm)   Wt 88.9 kg (196 lb)   LMP 02/19/2019 (Exact Date)   BMI 31.64 kg/m²     Physical examination;  Breast examination- No dominant masses, No skin retraction, No axillary adenopathy    Pelvic examination;  External genitalia-No visible lesions  Vagina-No blood or discharge  Cervix-No gross lesions, Pap smear taken  Uterus-Normal size and shape,  No tenderness  Adnexa No mass or tenderness    Examination difficult secondary to elevated BMI    Impression;  Normal annual    Plan;  Declines birth control  Check PAP  Start Mammogram age 40 yrs.

## 2019-03-02 DIAGNOSIS — Z01.419 WELL WOMAN EXAM: ICD-10-CM

## 2019-03-04 LAB
C TRACH DNA GENITAL QL NAA+PROBE: NEGATIVE
CYTOLOGY REG CYTOL: NORMAL
HPV HR 12 DNA CVX QL NAA+PROBE: NEGATIVE
HPV16 DNA SPEC QL NAA+PROBE: NEGATIVE
HPV18 DNA SPEC QL NAA+PROBE: NEGATIVE
N GONORRHOEA DNA GENITAL QL NAA+PROBE: NEGATIVE
SPECIMEN SOURCE: NORMAL
SPECIMEN SOURCE: NORMAL

## 2019-03-06 ENCOUNTER — OFFICE VISIT (OUTPATIENT)
Dept: MEDICAL GROUP | Facility: CLINIC | Age: 34
End: 2019-03-06
Payer: COMMERCIAL

## 2019-03-06 VITALS
OXYGEN SATURATION: 98 % | HEIGHT: 62 IN | WEIGHT: 196 LBS | RESPIRATION RATE: 12 BRPM | HEART RATE: 64 BPM | SYSTOLIC BLOOD PRESSURE: 124 MMHG | DIASTOLIC BLOOD PRESSURE: 64 MMHG | TEMPERATURE: 98.2 F | BODY MASS INDEX: 36.07 KG/M2

## 2019-03-06 DIAGNOSIS — J45.20 MILD INTERMITTENT ASTHMA WITHOUT COMPLICATION: ICD-10-CM

## 2019-03-06 DIAGNOSIS — E11.8 TYPE 2 DIABETES MELLITUS WITH COMPLICATION, WITHOUT LONG-TERM CURRENT USE OF INSULIN (HCC): ICD-10-CM

## 2019-03-06 DIAGNOSIS — Z72.53 HIGH RISK BISEXUAL BEHAVIOR: ICD-10-CM

## 2019-03-06 DIAGNOSIS — E28.2 PCOS (POLYCYSTIC OVARIAN SYNDROME): ICD-10-CM

## 2019-03-06 DIAGNOSIS — Z13.6 SCREENING FOR CARDIOVASCULAR CONDITION: ICD-10-CM

## 2019-03-06 DIAGNOSIS — E66.9 OBESITY (BMI 35.0-39.9 WITHOUT COMORBIDITY): ICD-10-CM

## 2019-03-06 PROCEDURE — 99213 OFFICE O/P EST LOW 20 MIN: CPT | Performed by: PHYSICIAN ASSISTANT

## 2019-03-06 RX ORDER — ALBUTEROL SULFATE 90 UG/1
2 AEROSOL, METERED RESPIRATORY (INHALATION) EVERY 6 HOURS PRN
Qty: 8.5 G | Refills: 4 | Status: SHIPPED | OUTPATIENT
Start: 2019-03-06 | End: 2022-11-03 | Stop reason: SDUPTHER

## 2019-03-07 NOTE — ASSESSMENT & PLAN NOTE
BMI has maintained at 36.43. She continues taking metformin and stopped taking victoza due to sulfur burps.

## 2019-03-07 NOTE — PROGRESS NOTES
Chief Complaint   Patient presents with   • Weight Loss       HISTORY OF PRESENT ILLNESS: Patient is a 33 y.o. female established patient who presents today for evaluation and management of:    Mild intermittent asthma without complication  While she is sick, she uses her albuterol inhaler about 4 days per week about twice per day but while she is well, the patient uses her albuterol inhaler less than once per week. She recently moved away from a home with a dog that was unsanitary and her symptoms have improved.     Obesity (BMI 35.0-39.9 without comorbidity) ([facility])  BMI has maintained at 36.43. She continues taking metformin and stopped taking victoza due to sulfur burps.     PCOS (polycystic ovarian syndrome)  Patient has been taking metformin recently and since starting this, she has steadily lost weight or at least maintained the same weight. She was started on metformin due to the weight gain from PCOS by her gynecologist. She is requesting refills today.     Type 2 diabetes mellitus with complication, without long-term current use of insulin ([facility])  Last A1c: ordered test. Still no result. Reordered.   DM Medications: metformin, Patient reports good medication compliance.   HTN: Blood pressure goal <140/<90 Yes  ACE: none.   Hyperlipidemia: Cholesterol goal LDL <100 Yes.   Currently Rx Statin: none.   Diabetic diet: No  Exercise: none.   Kidney function: test ordered.   Microalbumin screening: test ordered.   Has patient received flu vaccine: No  Has patient received Hep B series:No    A1c goal <7 Yes  Current barriers to control include none.   Hypoglycemic episodes No  Diabetic complications: peripheral neuropathy    The patient is not taking ASA every day and is taking all other medications as prescribed. Patient has had severe constipation with use of trulicity.          Patient Active Problem List    Diagnosis Date Noted   • Frequent nocturnal awakening 01/08/2019   • Type 2 diabetes mellitus with  complication, without long-term current use of insulin ([facility]) 2018   • CIDP (chronic inflammatory demyelinating polyneuropathy) ([facility]) 2018   • Chronic use of benzodiazepine for therapeutic purpose 2018   • Nonspecific abnormal electromyogram (EMG) 2018   • Obesity (BMI 35.0-39.9 without comorbidity) ([facility]) 2018   • Mild intermittent asthma without complication 2018   • Rash of back 10/12/2017   • Acute recurrent sinusitis 2017   • Bipolar affective disorder ([facility]) 2016   • PCOS (polycystic ovarian syndrome) 2016   • Acne vulgaris 2016       Allergies:Patient has no known allergies.    Current Outpatient Prescriptions   Medication Sig Dispense Refill   • albuterol 108 (90 Base) MCG/ACT Aero Soln inhalation aerosol Inhale 2 Puffs by mouth every 6 hours as needed for Shortness of Breath. 8.5 g 4   • metformin (GLUCOPHAGE) 1000 MG tablet Take 1 Tab by mouth 2 times a day, with meals. 180 Tab 3   • traZODone (DESYREL) 50 MG Tab      • omeprazole (PRILOSEC) 20 MG delayed-release capsule Take 20 mg by mouth every day.     • naproxen sodium (ANAPROX) 550 MG tablet Take 1 Tab by mouth 2 times a day, with meals. 60 Tab 3   • lamoTRIgine (LAMICTAL) 100 MG Tab Take 1 Tab by mouth every day. 90 Tab 3   • clonazepam (KLONOPIN) 0.5 MG TABS Take 0.25 mg by mouth 2 times a day.     • benzoyl peroxide-erythromycin (BENZAMYCIN) gel Bid to acne 30 g 5     No current facility-administered medications for this visit.        Social History   Substance Use Topics   • Smoking status: Never Smoker   • Smokeless tobacco: Never Used   • Alcohol use No       Family Status   Relation Status   • Mo Alive   • Fa    • MGFa (Not Specified)   • Neg Hx (Not Specified)     Family History   Problem Relation Age of Onset   • Hyperlipidemia Mother    • Hypertension Mother    • Alcohol/Drug Father    • Heart Disease Maternal Grandfather    • Cancer Maternal Grandfather         Colon   •  "Stroke Neg Hx    • Diabetes Neg Hx        Review of Systems: See HPI above.   Constitutional: Negative for fever, chills, weight loss and malaise.   HENT: Negative for ear pain, nosebleeds, congestion, sore throat and neck pain.    Eyes: Negative for blurred vision.   Respiratory: Negative for shortness of breath,   Cardiovascular: Negative for chest pain, palpitations  Gastrointestinal: Negative for heartburn, nausea, vomiting and abdominal pain.   Genitourinary: Negative for dysuria, urgency and frequency.   Musculoskeletal: Negative for myalgias, back pain and joint pain.   Skin: Negative for rash and itching.   Neurological: Negative for dizziness, tremors, focal weakness and headaches. Recently bilateral extremity pain has stopped and loss of sensation has occurred.       Exam:  Blood pressure 124/64, pulse 64, temperature 36.8 °C (98.2 °F), temperature source Temporal, resp. rate 12, height 1.562 m (5' 1.5\"), weight 88.9 kg (196 lb), last menstrual period 02/19/2019, SpO2 98 %, not currently breastfeeding.  Body mass index is 36.43 kg/m².  General:  Obese female in NAD  Head: is grossly normal.  Neck: Supple without masses. Thyroid is not visibly enlarged.  Pulmonary: Clear to ausculation. Normal effort. No rales, ronchi, or wheezing.  Cardiovascular: Regular rate and rhythm without murmur. Carotid pulses are intact and equal bilaterally.  Extremities: no clubbing, cyanosis, or edema.    Medical decision-making and discussion:  1. Type 2 diabetes mellitus with complication, without long-term current use of insulin ([facility])    - REFERRAL TO NUTRITION SERVICES  - Comp Metabolic Panel; Future  - HEMOGLOBIN A1C; Future    2. Obesity (BMI 35.0-39.9 without comorbidity) ([facility])    - REFERRAL TO NUTRITION SERVICES    3. Screening for cardiovascular condition    - Lipid Profile; Future    4. High risk bisexual behavior    - HIV AG/AB COMBO ASSAY SCREENING; Future  - HEPATITIS PANEL ACUTE(4 COMPONENTS); Future  - T.PALLIDUM " AB EIA; Future    5. Mild intermittent asthma without complication    - albuterol 108 (90 Base) MCG/ACT Aero Soln inhalation aerosol; Inhale 2 Puffs by mouth every 6 hours as needed for Shortness of Breath.  Dispense: 8.5 g; Refill: 4    6. PCOS (polycystic ovarian syndrome)    - metformin (GLUCOPHAGE) 1000 MG tablet; Take 1 Tab by mouth 2 times a day, with meals.  Dispense: 180 Tab; Refill: 3      Please note that this dictation was created using voice recognition software. I have made every reasonable attempt to correct obvious errors, but I expect that there are errors of grammar and possibly content that I did not discover before finalizing the note.      Return in about 6 months (around 9/6/2019) for Chronic conditions.

## 2019-03-07 NOTE — ASSESSMENT & PLAN NOTE
Last A1c: ordered test. Still no result. Reordered.   DM Medications: metformin, Patient reports good medication compliance.   HTN: Blood pressure goal <140/<90 Yes  ACE: none.   Hyperlipidemia: Cholesterol goal LDL <100 Yes.   Currently Rx Statin: none.   Diabetic diet: No  Exercise: none.   Kidney function: test ordered.   Microalbumin screening: test ordered.   Has patient received flu vaccine: No  Has patient received Hep B series:No    A1c goal <7 Yes  Current barriers to control include none.   Hypoglycemic episodes No  Diabetic complications: peripheral neuropathy    The patient is not taking ASA every day and is taking all other medications as prescribed. Patient has had severe constipation with use of trulicity.

## 2019-03-08 ENCOUNTER — PATIENT MESSAGE (OUTPATIENT)
Dept: HEALTH INFORMATION MANAGEMENT | Facility: OTHER | Age: 34
End: 2019-03-08

## 2019-03-11 PROBLEM — G47.33 OSA (OBSTRUCTIVE SLEEP APNEA): Status: ACTIVE | Noted: 2019-03-11

## 2019-03-11 PROBLEM — Z78.9 NON-SMOKER: Status: ACTIVE | Noted: 2019-03-11

## 2019-03-11 PROBLEM — K21.9 GASTROESOPHAGEAL REFLUX DISEASE: Status: ACTIVE | Noted: 2019-03-11

## 2019-03-11 NOTE — PROGRESS NOTES
"CC: New patient telemedicine consultation for \"trouble breathing at night\"    HPI:  Ms. Jennifer Gee is a 33-year-old female kindly referred by Mari Cordova PA-C for evaluation of possible obstructive sleep apnea syndrome..    The patient briefly describes her sleep problem as \"I wake up 8 or more times a night.  Sometimes gasping for air.  I have worn out my sheets from kicking at night.  My legs feel like I walked a mile\".  Her symptoms have been present for approximately 8 years.  She has difficulty maintaining wakefulness when working and driving and doing daily chores.    She generally goes to bed between 9 PM and 10 PM and wakes up between 6 AM and 7 AM.  She does not have a human regular bed partner but sleeps with her cats.  H her sleep latency is about 30 minutes.  She may watch TV or look at her phone just prior to turning out the lights and attempting to go to sleep.    She endorses 4-8 nocturnal awakenings and 2-4 episodes of nocturia.  Her symptoms include difficulty awakening and getting out of bed after sleeping, napping or returning to bed after arising, sleepiness during the day, too little sleep at night and tiredness during the day.  She nocturnal shortness of breath and anxiety each and every evening she is known to be a loud and heroic snorer.  She suffers from restless legs nightly lasting its she has been told that her arms and legs jerk and twitch during sleep.  She reports sleep talking, teeth grinding, waking up screaming or seemingly afraid, and experiences disturbing dreams.    Review of her sleep diary shows that did not nap at all but woke up feeling tired each and every morning.  Her roommate questionnaire describes loud snoring, light snoring, twitching of legs or feet during sleep, pauses in breathing, sleep talking, kicking with legs during sleep, and becoming very rigid or shaking.  Occasional loud snorts of also been noted.    Her total Unity score is 20 out of 24 which " is quite elevated.      Significant modifying factors and comorbidities include obesity, depression, type 2 diabetes, mild intermittent asthma, and gastroesophageal reflux.          Patient Active Problem List    Diagnosis Date Noted   • MELISSA (obstructive sleep apnea) 03/11/2019   • Non-smoker 03/11/2019   • Gastroesophageal reflux disease 03/11/2019   • Frequent nocturnal awakening 01/08/2019   • Type 2 diabetes mellitus with complication, without long-term current use of insulin ([facility]) 12/12/2018   • CIDP (chronic inflammatory demyelinating polyneuropathy) ([facility]) 03/06/2018   • Chronic use of benzodiazepine for therapeutic purpose 03/06/2018   • Nonspecific abnormal electromyogram (EMG) 03/06/2018   • Obesity (BMI 35.0-39.9 without comorbidity) ([facility]) 03/06/2018   • Mild intermittent asthma without complication 01/22/2018   • Rash of back 10/12/2017   • Acute recurrent sinusitis 09/21/2017   • Bipolar affective disorder ([facility]) 02/16/2016   • PCOS (polycystic ovarian syndrome) 02/16/2016   • Acne vulgaris 02/16/2016       Past Medical History:   Diagnosis Date   • Bladder infection 2012   • Borderline personality disorder ([facility])     cutter   • Chickenpox    • Depression    • Gastritis    • Influenza    • Migraine 2014   • Obesity    • Pneumonia 2007   • Restless leg syndrome         History reviewed. No pertinent surgical history.    Family History   Problem Relation Age of Onset   • Hyperlipidemia Mother    • Hypertension Mother    • Alcohol/Drug Father    • Heart Failure Father    • Heart Disease Maternal Grandfather    • Cancer Maternal Grandfather         Colon   • Stroke Neg Hx    • Diabetes Neg Hx        Social History     Social History   • Marital status: Single     Spouse name: N/A   • Number of children: N/A   • Years of education: N/A     Occupational History   • Not on file.     Social History Main Topics   • Smoking status: Never Smoker   • Smokeless tobacco: Never Used   • Alcohol use No   • Drug use: Yes  "    Types: Inhaled, Marijuana      Comment: medical card, 3 puffs/day   • Sexual activity: Yes     Partners: Female, Male     Other Topics Concern   • Not on file     Social History Narrative   • No narrative on file       Current Outpatient Prescriptions   Medication Sig Dispense Refill   • zolpidem (AMBIEN) 5 MG Tab Take 1 Tab by mouth at bedtime as needed for up to 3 doses. Take 1-3 tabs prn 3 Tab 0   • metformin (GLUCOPHAGE) 1000 MG tablet Take 1 Tab by mouth 2 times a day, with meals. 180 Tab 3   • traZODone (DESYREL) 50 MG Tab      • omeprazole (PRILOSEC) 20 MG delayed-release capsule Take 20 mg by mouth every day.     • naproxen sodium (ANAPROX) 550 MG tablet Take 1 Tab by mouth 2 times a day, with meals. 60 Tab 3   • lamoTRIgine (LAMICTAL) 100 MG Tab Take 1 Tab by mouth every day. 90 Tab 3   • clonazepam (KLONOPIN) 0.5 MG TABS Take 0.25 mg by mouth 2 times a day.     • albuterol 108 (90 Base) MCG/ACT Aero Soln inhalation aerosol Inhale 2 Puffs by mouth every 6 hours as needed for Shortness of Breath. 8.5 g 4   • benzoyl peroxide-erythromycin (BENZAMYCIN) gel Bid to acne (Patient not taking: Reported on 3/12/2019) 30 g 5     No current facility-administered medications for this visit.     \"CURRENT RX\"    ALLERGIES: Patient has no known allergies.    ROS    Constitutional: Positive for ever, chills, sweats,  fatigue.  Denies weight loss.  Eyes: Wears glasses, has eye drainage, complains of double vision but denies vision loss and pain  Ears/Nose/Mouth/Throat: Positive for all of the following earache, difficulty hearing, rhinitis/nasal congestion, injury, recurrent sore throat, decayed teeth/toothaches, ringing or buzzing in the ears.  Cardiovascular: Complains of chest tightness, swelling in legs or feet, and difficulty breathing when lying down but gets better with sitting up  Respiratory: Positive for shortness of breath and cough  Sleep: per HPI  Gastrointestinal: Positive for heartburn, nausea, abdominal " "pain, diarrhea, and constipation  Genitourinary: Positive for frequent urination and discharge  Musculoskeletal: Positive for all the following painful joints, sore muscles, back pain.   Integumentary: Denies rashes, lumps, color changes.   Neurological: Positive for weakness and dizziness but denies frequent headaches    PHYSICAL EXAM  Obese    /80 (BP Location: Right arm, Patient Position: Sitting, BP Cuff Size: Large adult)   Pulse 75   Temp 36.7 °C (98 °F)   Resp 16   Ht 1.562 m (5' 1.5\")   Wt 89.4 kg (197 lb)   LMP 02/19/2019 (Exact Date)   SpO2 100%   BMI 36.62 kg/m²   Telemedicine Physical Exam:  Appearance: Well-nourished, well-developed, no acute distress  HEENT: normocephalic/atraumatic  Lung auscultation:  No audible wheezing  Cardiac auscultation:  Regular rhythm  Abdomen: Obese  Extremities:  No cyanosis, clubbing, edema  Neurologic: Normal mental status, no focal findings    PROBLEMS:  1. MELISSA (obstructive sleep apnea)    - zolpidem (AMBIEN) 5 MG Tab; Take 1 Tab by mouth at bedtime as needed for up to 3 doses. Take 1-3 tabs prn  Dispense: 3 Tab; Refill: 0  - Polysomnography, 4 or More; Future    2. Obesity (BMI 35.0-39.9 without comorbidity) ([facility])    - OBESITY COUNSELING (No Charge): Patient identified as having weight management issue.  Appropriate orders and counseling given.    3. Restless leg syndrome      4. Need for influenza vaccination      5. Non-smoker      6. Gastroesophageal reflux disease, esophagitis presence not specified      7. Type 2 diabetes mellitus with complication, without long-term current use of insulin ([facility])        PLAN:   The patient has signs and symptoms consistent with obstructive sleep apnea hypopnea syndrome. Will schedule to have a nocturnal polysomnogram using zolpidem to assist with sleep onset and maintenance should the need arise. Will return after the results are available to determine further diagnostic needs and/or treatment options.    The risks of " untreated sleep apnea were discussed with the patient at length. Patients with MELISSA are at increased risk of cardiovascular disease including coronary artery disease, systemic arterial hypertension, pulmonary arterial hypertension, cardiac arrythmias, and stroke. MELISSA patients have an increased risk of motor vehicle accidents, type 2 diabetes, chronic kidney disease, and non-alcoholic liver disease. The patient was advised to avoid driving a motor vehicle when drowsy.    Positive airway pressure, such as CPAP, is considered first-line and preferred therapy for sleep apnea and may reverse both symptoms and risks.    Have advised the patient to follow up with the appropriate healthcare practitioners for all other medical problems and issues.    I think so is 70 9 AM modafinil and you try to prove that they can be be sent will 3 years since 10 mg up from his last years to IS 1 1 is sure is  Return for after sleep study.

## 2019-03-12 ENCOUNTER — HOSPITAL ENCOUNTER (OUTPATIENT)
Facility: MEDICAL CENTER | Age: 34
End: 2019-03-12
Attending: PHYSICIAN ASSISTANT
Payer: COMMERCIAL

## 2019-03-12 ENCOUNTER — NON-PROVIDER VISIT (OUTPATIENT)
Dept: MEDICAL GROUP | Facility: CLINIC | Age: 34
End: 2019-03-12
Payer: COMMERCIAL

## 2019-03-12 ENCOUNTER — TELEMEDICINE2 (OUTPATIENT)
Dept: SLEEP MEDICINE | Facility: MEDICAL CENTER | Age: 34
End: 2019-03-12

## 2019-03-12 VITALS
WEIGHT: 197 LBS | SYSTOLIC BLOOD PRESSURE: 118 MMHG | OXYGEN SATURATION: 100 % | BODY MASS INDEX: 36.25 KG/M2 | RESPIRATION RATE: 16 BRPM | HEIGHT: 62 IN | DIASTOLIC BLOOD PRESSURE: 80 MMHG | TEMPERATURE: 98 F | HEART RATE: 75 BPM

## 2019-03-12 DIAGNOSIS — Z78.9 NON-SMOKER: ICD-10-CM

## 2019-03-12 DIAGNOSIS — E66.9 OBESITY (BMI 35.0-39.9 WITHOUT COMORBIDITY): ICD-10-CM

## 2019-03-12 DIAGNOSIS — E11.8 TYPE 2 DIABETES MELLITUS WITH COMPLICATION, WITHOUT LONG-TERM CURRENT USE OF INSULIN (HCC): ICD-10-CM

## 2019-03-12 DIAGNOSIS — Z72.53 HIGH RISK BISEXUAL BEHAVIOR: ICD-10-CM

## 2019-03-12 DIAGNOSIS — Z13.6 SCREENING FOR CARDIOVASCULAR CONDITION: ICD-10-CM

## 2019-03-12 DIAGNOSIS — K21.9 GASTROESOPHAGEAL REFLUX DISEASE, ESOPHAGITIS PRESENCE NOT SPECIFIED: ICD-10-CM

## 2019-03-12 DIAGNOSIS — G47.33 OSA (OBSTRUCTIVE SLEEP APNEA): ICD-10-CM

## 2019-03-12 DIAGNOSIS — G25.81 RESTLESS LEG SYNDROME: ICD-10-CM

## 2019-03-12 DIAGNOSIS — Z23 NEED FOR INFLUENZA VACCINATION: ICD-10-CM

## 2019-03-12 LAB
ALBUMIN SERPL BCP-MCNC: 4.3 G/DL (ref 3.2–4.9)
ALBUMIN/GLOB SERPL: 1.6 G/DL
ALP SERPL-CCNC: 52 U/L (ref 30–99)
ALT SERPL-CCNC: 16 U/L (ref 2–50)
ANION GAP SERPL CALC-SCNC: 11 MMOL/L (ref 0–11.9)
AST SERPL-CCNC: 14 U/L (ref 12–45)
BILIRUB SERPL-MCNC: 0.4 MG/DL (ref 0.1–1.5)
BUN SERPL-MCNC: 14 MG/DL (ref 8–22)
CALCIUM SERPL-MCNC: 9.1 MG/DL (ref 8.5–10.5)
CHLORIDE SERPL-SCNC: 107 MMOL/L (ref 96–112)
CHOLEST SERPL-MCNC: 167 MG/DL (ref 100–199)
CO2 SERPL-SCNC: 23 MMOL/L (ref 20–33)
CREAT SERPL-MCNC: 0.61 MG/DL (ref 0.5–1.4)
GLOBULIN SER CALC-MCNC: 2.7 G/DL (ref 1.9–3.5)
GLUCOSE SERPL-MCNC: 82 MG/DL (ref 65–99)
HAV IGM SERPL QL IA: NEGATIVE
HBV CORE IGM SER QL: NEGATIVE
HBV SURFACE AG SER QL: NEGATIVE
HCV AB SER QL: NEGATIVE
HDLC SERPL-MCNC: 32 MG/DL
HIV 1+2 AB+HIV1 P24 AG SERPL QL IA: NON REACTIVE
LDLC SERPL CALC-MCNC: 116 MG/DL
POTASSIUM SERPL-SCNC: 4.2 MMOL/L (ref 3.6–5.5)
PROT SERPL-MCNC: 7 G/DL (ref 6–8.2)
SODIUM SERPL-SCNC: 141 MMOL/L (ref 135–145)
TREPONEMA PALLIDUM IGG+IGM AB [PRESENCE] IN SERUM OR PLASMA BY IMMUNOASSAY: NON REACTIVE
TRIGL SERPL-MCNC: 93 MG/DL (ref 0–149)

## 2019-03-12 PROCEDURE — 83036 HEMOGLOBIN GLYCOSYLATED A1C: CPT

## 2019-03-12 PROCEDURE — 99000 SPECIMEN HANDLING OFFICE-LAB: CPT | Performed by: FAMILY MEDICINE

## 2019-03-12 PROCEDURE — 99244 OFF/OP CNSLTJ NEW/EST MOD 40: CPT | Performed by: INTERNAL MEDICINE

## 2019-03-12 PROCEDURE — 36415 COLL VENOUS BLD VENIPUNCTURE: CPT | Performed by: FAMILY MEDICINE

## 2019-03-12 PROCEDURE — 80074 ACUTE HEPATITIS PANEL: CPT

## 2019-03-12 PROCEDURE — 86780 TREPONEMA PALLIDUM: CPT

## 2019-03-12 PROCEDURE — 87389 HIV-1 AG W/HIV-1&-2 AB AG IA: CPT

## 2019-03-12 PROCEDURE — 80053 COMPREHEN METABOLIC PANEL: CPT

## 2019-03-12 PROCEDURE — 80061 LIPID PANEL: CPT

## 2019-03-12 RX ORDER — ZOLPIDEM TARTRATE 5 MG/1
5 TABLET ORAL NIGHTLY PRN
Qty: 3 TAB | Refills: 0 | Status: SHIPPED | OUTPATIENT
Start: 2019-03-12 | End: 2019-04-12

## 2019-03-13 ENCOUNTER — TELEPHONE (OUTPATIENT)
Dept: MEDICAL GROUP | Facility: CLINIC | Age: 34
End: 2019-03-13

## 2019-03-13 ENCOUNTER — PATIENT MESSAGE (OUTPATIENT)
Dept: MEDICAL GROUP | Facility: CLINIC | Age: 34
End: 2019-03-13

## 2019-03-13 LAB
EST. AVERAGE GLUCOSE BLD GHB EST-MCNC: 103 MG/DL
HBA1C MFR BLD: 5.2 % (ref 0–5.6)

## 2019-03-13 NOTE — TELEPHONE ENCOUNTER
----- Message from Mari Cordova P.A.-C. sent at 3/13/2019 11:25 AM PDT -----  I reviewed labs. Everything looks good except an elevated cholesterol level. Return for follow up as scheduled.

## 2019-03-14 RX ORDER — OMEPRAZOLE 20 MG/1
20 CAPSULE, DELAYED RELEASE ORAL DAILY
Qty: 90 CAP | Refills: 3 | Status: SHIPPED | OUTPATIENT
Start: 2019-03-14 | End: 2020-02-26

## 2019-03-22 ENCOUNTER — TELEPHONE (OUTPATIENT)
Dept: HEALTH INFORMATION MANAGEMENT | Facility: OTHER | Age: 34
End: 2019-03-22

## 2019-03-22 DIAGNOSIS — E11.8 TYPE 2 DIABETES MELLITUS WITH COMPLICATION, WITHOUT LONG-TERM CURRENT USE OF INSULIN (HCC): ICD-10-CM

## 2019-03-22 NOTE — TELEPHONE ENCOUNTER
Nathaly Cordova     Patient is requesting lab orders for Urine Acr / Microalbumin    Please review     Thank you   Priyanka

## 2019-03-30 ENCOUNTER — SLEEP STUDY (OUTPATIENT)
Dept: SLEEP MEDICINE | Facility: MEDICAL CENTER | Age: 34
End: 2019-03-30
Attending: INTERNAL MEDICINE
Payer: COMMERCIAL

## 2019-03-30 DIAGNOSIS — G47.33 OSA (OBSTRUCTIVE SLEEP APNEA): ICD-10-CM

## 2019-03-30 PROCEDURE — 95811 POLYSOM 6/>YRS CPAP 4/> PARM: CPT | Performed by: FAMILY MEDICINE

## 2019-04-01 NOTE — PROCEDURES
The patient underwent a split night polysomnogram with a CPAP titration using the standard montage for measurement of paramaters of sleep, respiratory events, movement abnormalities, heart rate and rhythm.  A Microphone was used to monitior snoring.    Interpretation:  Study start time was 08:29:09 PM.  Total recording time was 3h 26.5m (206 minutes) with a total sleep time of 2h 23.0m (143 minutes) resulting in a sleep efficiency of 69.25%.  Sleep latency from the start fo the study was 15 minutes minutes and REM latency from sleep onset was 00 minutes minutes.    Respiratory:   There were 0 apneas in total consisting of 0 obstructive apneas, 0 mixed apneas, and 0 central apneas.  There were 54 hypopneas in total.  The apnea index was 0.00 per hour and the hypopnea index was 22.66 per hour.  The overall AHI was 22.7, with a REM AHI of 0.00, and a supine AHI of 55.10.    Limb Movements:  There were a total of 26 periodic leg movements, of which 4 were PLMS arousals.  This resulted in a PLMS index of 10.9 and a PLMS arousal index of 1.7    Oximetry:  The mean SaO2 was 92.0% for the diagnostic portion of the study, with a minimum SaO2 of 87.0%.      Treatment:  Interpretation:  Treatment recording time was 5h 56.0m (356 minutes) with a total sleep time of 5h 29.0m (329 minutes) resulting in a sleep efficiency of 92.4%.    Sleep latency from the start of treatment was 05 minutes minutes and REM latency from sleep onset was 0h 14.0m minutes.    The patient had 87 arousals in total for an arousal index of 15.9.    Respiratory:   There were 4 apneas in total consisting of  0 obstructive apneas, 4 central apneas, and 0 mixed apneas for an apnea index of 0.73.    The patient had 21 hypopneas in total, which resulted in a hypopnea index of 3.83.    The overall AHI was 4.56, with a REM AHI of 6.63, and a supine AHI of 7.96.       Limb Movements:  There were a total of 27 periodic leg movements, of which 8 were PLMS arousals.   This resulted in a PLMS index of 4.9 and a PLMS arousal index of 1.5.    Oximetry:  The mean SaO2 during treatment was 94.0%, with a minimum oxygen saturation of 87.0%.    CPAP was tried from 5cm H2O to 9cm H2O.      Technical summary: The patient underwent a split-night polysomnogram. This was a 16 channel montage study to include a 6 channel EEG, a 2 channel EOG, and chin EMG, left and right leg EMG, a snore channel, a nasal pressure transducer, and a nasal oral airflow semester and a CFLOW pressure transducer.   Respiratory effort was assessed with the use of a thoracic and abdominal monitor and overnight oximetry was obtained. Audio and video recordings were reviewed. This was a fully attended study and sleep stage scoring was performed. The test was technically adequate.    Scoring Criteria: A modification of the the AASM Manual for the Scoring of Sleep and Associated Events, 2012, was used.   Obstructive apnea was scored by cessation of airflow for at least 10 seconds with continuing respiratory effort.  Central apnea was scored by cessation of airflow for at least 10 seconds with no effort.  Hypopnea was scored by a 30% or more reduction in airflow for at least 10 seconds accompanied by an arterial oxygen desaturation of 3% or more.  (For Medicare patients, hypopneas were scored by a 30% or more reduction in airflow for at least 10 seconds accompanied by an arterial oxygen saturation of 4% or more, as required by their insurance, CMS.    Impression:  1.  Moderate obstructive sleep apnea with AHI of 22.7/hr and O2 rebeca 87 %. Due to severity of the disease she met the split study protocol. The titration started with CPAP 5 cm and the best tolerated was CPAP 9 cm. The AHI improved to 3.27/hr with improved O2 rebeca of 91% and average O2 saturation of 94 %. Non supine REM was observed on the ending pressure.    Recommendations:  I recommend CPAP of 9 cm with small simplus mask. I also recommend 30 day compliance  download to assess the efficacy to the recommended pressure, measure leak, apnea hypopnea index and compliance for further outpatient monitoring and management of CPAP therapy. In some cases alternative treatment options may prove effective in resolving sleep apnea and these options include upper airway surgery, the use of a dental orthotic or weight loss and positional therapy. Clinical correlation is required. In general patients with sleep apnea are advised to avoid alcohol and sedatives and to not operate a motor vehicle while drowsy and are at a greater risk for cardiovascular disease.

## 2019-04-29 NOTE — PROGRESS NOTES
CC: PSG results    HPI:    Ms. Jennifer Gee is a 33-year-old female kindly referred by Mari Cordova PA-C for evaluation of possible obstructive sleep apnea syndrome  and seen as a new telemedicine patient on 3/12/2019.  Her PSG performed 3/30/2019 showed an AHI 22.7 with a rebeca saturation of 87%.  On CPAP at 9 cm H2O the patient's AHI was 3.27 with a rebeca saturation of 91% and average saturation of 94%.  She used a small Simplus mask.      Significant modifying factors and comorbidities include obesity, depression, type 2 diabetes, non-smoker, mild intermittent asthma, and gastroesophageal reflux.      Patient Active Problem List    Diagnosis Date Noted   • MELISSA (obstructive sleep apnea) 03/11/2019   • Non-smoker 03/11/2019   • Gastroesophageal reflux disease 03/11/2019   • Frequent nocturnal awakening 01/08/2019   • CIDP (chronic inflammatory demyelinating polyneuropathy) ([facility]) 03/06/2018   • Chronic use of benzodiazepine for therapeutic purpose 03/06/2018   • Nonspecific abnormal electromyogram (EMG) 03/06/2018   • Obesity (BMI 35.0-39.9 without comorbidity) ([facility]) 03/06/2018   • Mild intermittent asthma without complication 01/22/2018   • Rash of back 10/12/2017   • Acute recurrent sinusitis 09/21/2017   • Bipolar affective disorder ([facility]) 02/16/2016   • PCOS (polycystic ovarian syndrome) 02/16/2016   • Acne vulgaris 02/16/2016       Past Medical History:   Diagnosis Date   • Bladder infection 2012   • Borderline personality disorder ([facility])     cutter   • Chickenpox    • Depression    • Gastritis    • Influenza    • Migraine 2014   • Obesity    • Pneumonia 2007   • Restless leg syndrome         History reviewed. No pertinent surgical history.    Family History   Problem Relation Age of Onset   • Hyperlipidemia Mother    • Hypertension Mother    • Alcohol/Drug Father    • Heart Failure Father    • Heart Disease Maternal Grandfather    • Cancer Maternal Grandfather         Colon   • Stroke Neg Hx    •  "Diabetes Neg Hx        Social History     Social History   • Marital status: Single     Spouse name: N/A   • Number of children: N/A   • Years of education: N/A     Occupational History   • Not on file.     Social History Main Topics   • Smoking status: Never Smoker   • Smokeless tobacco: Never Used   • Alcohol use No   • Drug use: Yes     Types: Inhaled, Marijuana      Comment: medical card, 3 puffs/day   • Sexual activity: Yes     Partners: Female, Male     Other Topics Concern   • Not on file     Social History Narrative   • No narrative on file       Current Outpatient Prescriptions   Medication Sig Dispense Refill   • omeprazole (PRILOSEC) 20 MG delayed-release capsule Take 1 Cap by mouth every day. 90 Cap 3   • albuterol 108 (90 Base) MCG/ACT Aero Soln inhalation aerosol Inhale 2 Puffs by mouth every 6 hours as needed for Shortness of Breath. 8.5 g 4   • metformin (GLUCOPHAGE) 1000 MG tablet Take 1 Tab by mouth 2 times a day, with meals. 180 Tab 3   • traZODone (DESYREL) 50 MG Tab      • naproxen sodium (ANAPROX) 550 MG tablet Take 1 Tab by mouth 2 times a day, with meals. 60 Tab 3   • clonazepam (KLONOPIN) 0.5 MG TABS Take 0.25 mg by mouth 2 times a day.       No current facility-administered medications for this visit.     \"CURRENT RX\"    ALLERGIES: Patient has no known allergies.    ROS    Constitutional: Denies fever, chills, sweats,  weight loss, fatigue.  Eyes: Denies vision loss, pain, drainage, double vision, glasses.  Ears/Nose/Mouth/Throat: Denies earache, difficulty hearing, rhinitis/nasal congestion, injury, recurrent sore throat, persistent hoarseness, decayed teeth/toothaches, ringing or buzzing in the ears.  Cardiovascular: Denies chest pain, tightness, palpitations, swelling in legs/feet, fainting, difficulty breathing when lying down but gets better when sitting up.   Respiratory: Denies shortness of breath, cough, sputum, wheezing, painful breathing, coughing up blood.   Sleep: per " "HPI  Gastrointestinal: Denies  difficulty swallowing, nausea, abdominal pain, diarrhea, constipation, heartburn.  Genitourinary: Denies  blood in urine, discharge, frequent urination.   Musculoskeletal: Denies painful joints, sore muscles, back pain.   Integumentary: Denies rashes, lumps, color changes.   Neurological: Denies frequent headaches,weakness, dizziness.    PHYSICAL EXAM      /76 (BP Location: Left arm, Patient Position: Sitting, BP Cuff Size: Adult)   Pulse 86   Resp 14   Ht 1.575 m (5' 2\")   Wt 88 kg (194 lb)   SpO2 95%   BMI 35.48 kg/m²     Telemed only slightly operable therefore no PE.      PROBLEMS:  1. MELISSA (obstructive sleep apnea)    - DME CPAP    2. Obesity (BMI 35.0-39.9 without comorbidity) ([facility])    - OBESITY COUNSELING (No Charge): Patient identified as having weight management issue.  Appropriate orders and counseling given.    3. Non-smoker      4. Gastroesophageal reflux disease, esophagitis presence not specified        PLAN:   PSG performed 3/30/2019 showed an AHI 22.7 with a rebeca saturation of 87%.  On CPAP at 9 cm H2O the patient's AHI was 3.27 with a rebeca saturation of 91% and average saturation of 94%.  She used a small Simplus mask.            Return in about 10 weeks (around 7/9/2019).                      "

## 2019-04-30 ENCOUNTER — OFFICE VISIT (OUTPATIENT)
Dept: MEDICAL GROUP | Facility: CLINIC | Age: 34
End: 2019-04-30
Payer: COMMERCIAL

## 2019-04-30 ENCOUNTER — TELEMEDICINE2 (OUTPATIENT)
Dept: SLEEP MEDICINE | Facility: MEDICAL CENTER | Age: 34
End: 2019-04-30
Payer: COMMERCIAL

## 2019-04-30 VITALS
HEIGHT: 62 IN | DIASTOLIC BLOOD PRESSURE: 76 MMHG | HEART RATE: 86 BPM | WEIGHT: 194 LBS | RESPIRATION RATE: 14 BRPM | BODY MASS INDEX: 35.7 KG/M2 | OXYGEN SATURATION: 95 % | SYSTOLIC BLOOD PRESSURE: 118 MMHG

## 2019-04-30 VITALS
TEMPERATURE: 98.2 F | HEIGHT: 62 IN | OXYGEN SATURATION: 98 % | DIASTOLIC BLOOD PRESSURE: 72 MMHG | HEART RATE: 84 BPM | BODY MASS INDEX: 35.88 KG/M2 | SYSTOLIC BLOOD PRESSURE: 116 MMHG | WEIGHT: 195 LBS | RESPIRATION RATE: 16 BRPM

## 2019-04-30 DIAGNOSIS — E66.9 OBESITY (BMI 35.0-39.9 WITHOUT COMORBIDITY): ICD-10-CM

## 2019-04-30 DIAGNOSIS — G47.33 OSA (OBSTRUCTIVE SLEEP APNEA): ICD-10-CM

## 2019-04-30 DIAGNOSIS — Z78.9 NON-SMOKER: ICD-10-CM

## 2019-04-30 DIAGNOSIS — Z87.09 HISTORY OF STREP PHARYNGITIS: ICD-10-CM

## 2019-04-30 DIAGNOSIS — K21.9 GASTROESOPHAGEAL REFLUX DISEASE, ESOPHAGITIS PRESENCE NOT SPECIFIED: ICD-10-CM

## 2019-04-30 PROBLEM — E11.8 TYPE 2 DIABETES MELLITUS WITH COMPLICATION, WITHOUT LONG-TERM CURRENT USE OF INSULIN (HCC): Status: RESOLVED | Noted: 2018-12-12 | Resolved: 2019-04-30

## 2019-04-30 PROCEDURE — 99214 OFFICE O/P EST MOD 30 MIN: CPT | Performed by: INTERNAL MEDICINE

## 2019-04-30 PROCEDURE — 99213 OFFICE O/P EST LOW 20 MIN: CPT | Performed by: PHYSICIAN ASSISTANT

## 2019-04-30 NOTE — PROGRESS NOTES
Chief Complaint   Patient presents with   • Follow-Up     Lab results / hep B       HISTORY OF PRESENT ILLNESS: Patient is a 33 y.o. female established patient who presents today for evaluation and management of:    MELISSA (obstructive sleep apnea)  Recently evaulated via sleep study, patient is advised that she likely need CPAP although she prefer to try having her tonsils removed first as they are quite large and somewhat obstructive and she tends to get strep pharyngitis at least 5 times per year, per her report. Documentation is being requested for this. She is requesting to have ENT referral today.        Patient Active Problem List    Diagnosis Date Noted   • MELISSA (obstructive sleep apnea) 03/11/2019   • Non-smoker 03/11/2019   • Gastroesophageal reflux disease 03/11/2019   • Frequent nocturnal awakening 01/08/2019   • CIDP (chronic inflammatory demyelinating polyneuropathy) ([facility]) 03/06/2018   • Chronic use of benzodiazepine for therapeutic purpose 03/06/2018   • Nonspecific abnormal electromyogram (EMG) 03/06/2018   • Obesity (BMI 35.0-39.9 without comorbidity) ([facility]) 03/06/2018   • Mild intermittent asthma without complication 01/22/2018   • Rash of back 10/12/2017   • Acute recurrent sinusitis 09/21/2017   • Bipolar affective disorder ([facility]) 02/16/2016   • PCOS (polycystic ovarian syndrome) 02/16/2016   • Acne vulgaris 02/16/2016       Allergies:Patient has no known allergies.    Current Outpatient Prescriptions   Medication Sig Dispense Refill   • omeprazole (PRILOSEC) 20 MG delayed-release capsule Take 1 Cap by mouth every day. 90 Cap 3   • albuterol 108 (90 Base) MCG/ACT Aero Soln inhalation aerosol Inhale 2 Puffs by mouth every 6 hours as needed for Shortness of Breath. 8.5 g 4   • metformin (GLUCOPHAGE) 1000 MG tablet Take 1 Tab by mouth 2 times a day, with meals. 180 Tab 3   • traZODone (DESYREL) 50 MG Tab      • naproxen sodium (ANAPROX) 550 MG tablet Take 1 Tab by mouth 2 times a day, with meals. 60 Tab 3  "  • clonazepam (KLONOPIN) 0.5 MG TABS Take 0.25 mg by mouth 2 times a day.       No current facility-administered medications for this visit.        Social History   Substance Use Topics   • Smoking status: Never Smoker   • Smokeless tobacco: Never Used   • Alcohol use No       Family Status   Relation Status   • Mo Alive   • Fa    • MGFa (Not Specified)   • Neg Hx (Not Specified)     Family History   Problem Relation Age of Onset   • Hyperlipidemia Mother    • Hypertension Mother    • Alcohol/Drug Father    • Heart Failure Father    • Heart Disease Maternal Grandfather    • Cancer Maternal Grandfather         Colon   • Stroke Neg Hx    • Diabetes Neg Hx        Review of Systems: See HPI above.   Constitutional: Negative for fever, chills, weight loss and malaise.   HENT: Negative for current ear pain, nosebleeds, congestion, sore throat and neck pain.    Eyes: Negative for blurred vision.   Respiratory: Negative for shortness of breath,  Cardiovascular: Negative for chest pain  Neurological: Positive for chronic tingling, sensory change. Negative focal weakness and headaches.   Endo/Heme/Allergies: Does not bruise/bleed easily.   Psychiatric/Behavioral: Negative for depression, suicidal ideas and memory loss.  The patient is not nervous/anxious and does not have insomnia.      Exam:  /72 (BP Location: Left arm, Patient Position: Sitting, BP Cuff Size: Large adult)   Pulse 84   Temp 36.8 °C (98.2 °F) (Temporal)   Resp 16   Ht 1.562 m (5' 1.5\")   Wt 88.5 kg (195 lb)   SpO2 98%   Body mass index is 36.25 kg/m².  General:  Obese female in NAD  Head: is grossly normal.  Neck: Supple without masses. Thyroid is not visibly enlarged.  Pulmonary: Clear to ausculation. Normal effort. No rales, ronchi, or wheezing.  Cardiovascular: Regular rate and rhythm without murmur. Carotid pulses are intact and equal bilaterally.  Extremities: no clubbing, cyanosis, or edema.    Medical decision-making and " discussion:  1. MELISSA (obstructive sleep apnea)    - REFERRAL TO ENT    2. History of strep pharyngitis      Please note that this dictation was created using voice recognition software. I have made every reasonable attempt to correct obvious errors, but I expect that there are errors of grammar and possibly content that I did not discover before finalizing the note.      Return for Chronic conditions .

## 2019-04-30 NOTE — ASSESSMENT & PLAN NOTE
Recently evaulated via sleep study, patient is advised that she likely need CPAP although she prefer to try having her tonsils removed first as they are quite large and somewhat obstructive and she tends to get strep pharyngitis at least 5 times per year, per her report. Documentation is being requested for this. She is requesting to have ENT referral today.

## 2019-05-17 ENCOUNTER — APPOINTMENT (OUTPATIENT)
Dept: SCHEDULING | Facility: IMAGING CENTER | Age: 34
End: 2019-05-17
Payer: COMMERCIAL

## 2019-07-09 ENCOUNTER — TELEMEDICINE2 (OUTPATIENT)
Dept: SLEEP MEDICINE | Facility: MEDICAL CENTER | Age: 34
End: 2019-07-09
Payer: COMMERCIAL

## 2019-07-09 ENCOUNTER — TELEMEDICINE ORIGINATING SITE VISIT (OUTPATIENT)
Dept: MEDICAL GROUP | Facility: CLINIC | Age: 34
End: 2019-07-09
Payer: COMMERCIAL

## 2019-07-09 VITALS
TEMPERATURE: 98.4 F | DIASTOLIC BLOOD PRESSURE: 78 MMHG | BODY MASS INDEX: 36.95 KG/M2 | OXYGEN SATURATION: 98 % | HEIGHT: 62 IN | RESPIRATION RATE: 12 BRPM | HEART RATE: 74 BPM | WEIGHT: 200.8 LBS | SYSTOLIC BLOOD PRESSURE: 120 MMHG

## 2019-07-09 DIAGNOSIS — E28.2 PCOS (POLYCYSTIC OVARIAN SYNDROME): ICD-10-CM

## 2019-07-09 DIAGNOSIS — J45.20 MILD INTERMITTENT ASTHMA WITHOUT COMPLICATION: ICD-10-CM

## 2019-07-09 DIAGNOSIS — G47.10 HYPERSOMNOLENCE: ICD-10-CM

## 2019-07-09 DIAGNOSIS — G47.33 OBSTRUCTIVE SLEEP APNEA SYNDROME: ICD-10-CM

## 2019-07-09 PROCEDURE — 99214 OFFICE O/P EST MOD 30 MIN: CPT | Performed by: INTERNAL MEDICINE

## 2019-07-09 NOTE — PROGRESS NOTES
CC:  Sleep apnea hypopnea syndrome, daytime somnolence.  This evaluation was conducted via telemedicine.    HPI:   Ms. Gee was last seen in April 30, 2019.  She presented with snoring, non-refreshing sleep and excessive daytime somnolence.  A polysomnogram on March 30, 2019 demonstrated an apnea hypopnea index of 22.7 events per hour with a lowest arterial oxygen saturation of 87%.  She was titrated to CPAP at 9 cm of water with an apnea hypopnea index of 3.3 events per hour and a lowest saturation of 91% on room air.    She has adapted well to the CPAP at 9 cm of water using a small Simplus mask.  She needed to make some adjustments to the headgear initially but now feels that the mask is fitting well without pinching or leakage.  She is wearing the mask each night but in the early part of the month there were several nights where she found that she had unconsciously remove the mask in the middle of the night.  Over the last 2 weeks she has consistently used a mask throughout the night.  She is noticed a marked improvement in daytime alertness.  She does not seem to be napping or falling asleep inappropriately.    The CPAP data recording information is reviewed with the patient.  It demonstrates use in each of the last 30 days with an average daily usage of 6 hours and 30 minutes.  In the last 2 weeks usage is average closer to 7 to 8 hours.  Leak is minimal and the estimated residual apnea hypopnea index is 0.5 events per hour.        Patient Active Problem List    Diagnosis Date Noted   • MELISSA (obstructive sleep apnea) 03/11/2019   • Non-smoker 03/11/2019   • Gastroesophageal reflux disease 03/11/2019   • Frequent nocturnal awakening 01/08/2019   • CIDP (chronic inflammatory demyelinating polyneuropathy) ([facility]) 03/06/2018   • Chronic use of benzodiazepine for therapeutic purpose 03/06/2018   • Nonspecific abnormal electromyogram (EMG) 03/06/2018   • Obesity (BMI 35.0-39.9 without comorbidity) ([facility]) 03/06/2018    • Mild intermittent asthma without complication 01/22/2018   • Rash of back 10/12/2017   • Acute recurrent sinusitis 09/21/2017   • Bipolar affective disorder ([facility]) 02/16/2016   • PCOS (polycystic ovarian syndrome) 02/16/2016   • Acne vulgaris 02/16/2016       Past Medical History:   Diagnosis Date   • Bladder infection 2012   • Borderline personality disorder (HCC)     cutter   • Chickenpox    • Depression    • Gastritis    • Influenza    • Migraine 2014   • Obesity    • Pneumonia 2007   • Restless leg syndrome        History reviewed. No pertinent surgical history.    Family History   Problem Relation Age of Onset   • Hyperlipidemia Mother    • Hypertension Mother    • Alcohol/Drug Father    • Heart Failure Father    • Heart Disease Maternal Grandfather    • Cancer Maternal Grandfather         Colon   • Stroke Neg Hx    • Diabetes Neg Hx        Social History     Social History   • Marital status: Single     Spouse name: N/A   • Number of children: N/A   • Years of education: N/A     Occupational History   • Not on file.     Social History Main Topics   • Smoking status: Never Smoker   • Smokeless tobacco: Never Used   • Alcohol use No   • Drug use: Yes     Types: Inhaled, Marijuana      Comment: medical card, 3 puffs/day   • Sexual activity: Yes     Partners: Female, Male     Other Topics Concern   • Not on file     Social History Narrative   • No narrative on file       Current Outpatient Prescriptions   Medication Sig Dispense Refill   • omeprazole (PRILOSEC) 20 MG delayed-release capsule Take 1 Cap by mouth every day. 90 Cap 3   • metformin (GLUCOPHAGE) 1000 MG tablet Take 1 Tab by mouth 2 times a day, with meals. 180 Tab 3   • traZODone (DESYREL) 50 MG Tab      • naproxen sodium (ANAPROX) 550 MG tablet Take 1 Tab by mouth 2 times a day, with meals. 60 Tab 3   • clonazepam (KLONOPIN) 0.5 MG TABS Take 0.25 mg by mouth 2 times a day.     • albuterol 108 (90 Base) MCG/ACT Aero Soln inhalation aerosol Inhale 2  "Puffs by mouth every 6 hours as needed for Shortness of Breath. 8.5 g 4     No current facility-administered medications for this visit.     \"CURRENT RX\"      Allergies: Patient has no known allergies.      ROS  Unchanged from prior.      Physical Exam:   /78 (BP Location: Right arm, Patient Position: Sitting, BP Cuff Size: Adult)   Pulse 74   Temp 36.9 °C (98.4 °F)   Resp 12   Ht 1.575 m (5' 2\")   Wt 91.1 kg (200 lb 12.8 oz)   SpO2 98%   BMI 36.73 kg/m²    Head and neck examination demonstrates no mucosal lesion, purulent drainage or evident polyps. The pharynx is benign with a Mallampati I presentation. The neck is supple without thyromegaly. On chest examination there are symmetrical bilateral breath sounds without rales, wheezing or consolidation. On cardiac examination, the apical impulse and heart sounds are normal and the rhythm is regular. There is no murmur, gallop or rub and no jugular venous distention. The abdomen is soft with active bowel sounds and no palpable hepatosplenomegaly, mass, guarding or rebound. The extremities show no clubbing, cyanosis or edema and no signs of deep venous thrombosis. There is no warmth, redness, tenderness or palpable venous cord in the calves. The skin is clear, warm and dry. There is no unusual peripheral lymphadenopathy. Peripheral pulses are palpable in all 4 extremities. On neurologic examination, cranial nerve function is intact, motor tone is symmetrical, and the patient is alert, oriented and responsive.       Problems:  1. Obstructive sleep apnea syndrome  She has moderately severe obstructive sleep apnea hypopnea with an apnea hypotony index of 22.7 events per hour and a lowest arterial oxygen saturation of 87%.  She is doing well on CPAP.  She is using machine nightly and for sufficient number of hours, as confirmed by the data recorder.  She and has enjoyed a marked improvement in daytime alertness and has a low residual apnea hypopnea index.    2. " Hypersomnolence  Improved on CPAP.    3. Mild intermittent asthma without complication  Stable without complicating factors and requiring only the occasional use of the albuterol inhaler.    4. PCOS (polycystic ovarian syndrome)    5. BMI 36.0-36.9,adult        Plan:   1.  Continue CPAP at 9 cm water pressure.    2.  Return visit here, perhaps via telemedicine, in about 3 months, bringing the CPAP data recording chip with her.    We appreciate the opportunity to assist in her care.

## 2019-07-10 DIAGNOSIS — G62.9 POLYNEUROPATHY: ICD-10-CM

## 2019-07-12 RX ORDER — NAPROXEN SODIUM 550 MG/1
TABLET ORAL
Qty: 60 TAB | Refills: 2 | Status: SHIPPED | OUTPATIENT
Start: 2019-07-12 | End: 2020-01-29

## 2019-07-29 ENCOUNTER — TELEPHONE (OUTPATIENT)
Dept: NEUROLOGY | Facility: MEDICAL CENTER | Age: 34
End: 2019-07-29

## 2019-07-29 NOTE — TELEPHONE ENCOUNTER
----- Message from Randy Love, Med Ass't sent at 7/29/2019 10:45 AM PDT -----  Regarding: FW: Prescription Question  Contact: [phone]      ----- Message -----  From: Jennifer Gee  Sent: 7/28/2019  12:54 PM  To: [facility]  Subject: Prescription Question                            Can you prescribe me a 10s machine? My legs are hurting more and more from the neuropathy and my cousin said the 10s machine helps her a lot. My insurance should cover it. Thank you!

## 2019-07-29 NOTE — TELEPHONE ENCOUNTER
What I think she means by '10s' is a TENS stimulator, used in chronic pain.  In any case, I need to see her in the office first.

## 2019-08-22 ENCOUNTER — OFFICE VISIT (OUTPATIENT)
Dept: NEUROLOGY | Facility: MEDICAL CENTER | Age: 34
End: 2019-08-22
Payer: COMMERCIAL

## 2019-08-22 VITALS
SYSTOLIC BLOOD PRESSURE: 122 MMHG | WEIGHT: 200 LBS | HEIGHT: 62 IN | RESPIRATION RATE: 16 BRPM | BODY MASS INDEX: 36.8 KG/M2 | DIASTOLIC BLOOD PRESSURE: 74 MMHG | HEART RATE: 75 BPM | TEMPERATURE: 98.4 F | OXYGEN SATURATION: 98 %

## 2019-08-22 DIAGNOSIS — G43.019 INTRACTABLE MIGRAINE WITHOUT AURA AND WITHOUT STATUS MIGRAINOSUS: Primary | ICD-10-CM

## 2019-08-22 DIAGNOSIS — G60.0 CHARCOT-MARIE-TOOTH DISEASE: ICD-10-CM

## 2019-08-22 PROCEDURE — 99214 OFFICE O/P EST MOD 30 MIN: CPT | Performed by: PSYCHIATRY & NEUROLOGY

## 2019-08-22 RX ORDER — ZOLMITRIPTAN 5 MG/1
TABLET, ORALLY DISINTEGRATING ORAL
Qty: 9 TAB | Refills: 6 | Status: SHIPPED | OUTPATIENT
Start: 2019-08-22 | End: 2020-01-29

## 2019-08-22 RX ORDER — TOPIRAMATE 50 MG/1
1 CAPSULE, EXTENDED RELEASE ORAL EVERY EVENING
Qty: 60 CAP | Refills: 2 | Status: SHIPPED
Start: 2019-08-22 | End: 2019-12-17

## 2019-08-22 RX ORDER — TOPIRAMATE 100 MG/1
1 CAPSULE, EXTENDED RELEASE ORAL EVERY EVENING
Qty: 30 CAP | Refills: 11 | Status: SHIPPED | OUTPATIENT
Start: 2019-08-22 | End: 2019-12-17

## 2019-08-22 ASSESSMENT — ENCOUNTER SYMPTOMS
CONSTIPATION: 0
TINGLING: 1
FOCAL WEAKNESS: 1
TREMORS: 0
WEAKNESS: 1
LOSS OF CONSCIOUSNESS: 0
HEADACHES: 1
SPEECH CHANGE: 0
MEMORY LOSS: 0
PHOTOPHOBIA: 1
SENSORY CHANGE: 1

## 2019-08-23 NOTE — PROGRESS NOTES
Subjective:      Jennifer Gee is a 33 y.o. 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  She is also been having more more problems with her headaches, something which has not been checked on.    HPI    Polyneuropathy: When last seen, she had had MRI scan of the neck done, revealing enhancement of multiple nerve roots consistent with possible CIDP.  EMG/NCV studies were consistent with a diffuse demyelinating sensorimotor polyneuropathy.  We had talked about CSF studies as well as genetic screens being done.  The latter was emphasized because several family members, according to her mother who was present at the time, suffered from the same condition.  Genetics came back positive for the isolated mutation mentioned above.  CSF studies were obviated, though she was not comfortable having them done anyway, she actually refused the procedure when the time came.    She still has the discomfort in both legs down the lateral calves into the feet.  There is mostly numbness in the feet.  She has an occasional tingling and numbness in the hands.  Balance is curtailed, she has learned to look at the ground to make sure she does not trip.  When driving she has to watch where her feet go as well as whether she can move the pedals.  She has not fallen.  She denies major issues with skin changes, muscle cramping, cranial nerve abnormalities, or changes in bowel and bladder function.    Migraine: Her headaches have been increasing, she now complains of a moderate daily headache which is bilateral, can have some cognitive impairment and sensitivity to light, the severe attacks are incapacitating, nausea and vomiting, heightened sensitivities and impaired concentration rule the day.  These can last several days, have been occurring at once a week  "for quite some time.  Severe headaches increase anytime of day or night.  There is a positive family history on her mother side.  She uses daily OTC medications which provide limited benefit.  She had been prescribed naproxen, again ineffective, but fortunately has not created any GI disturbances.    Medical, surgical and family histories are reviewed in the electronic health record, there are no new drug allergies.  She is asking for a TENS unit to help with her pain, is taking Glucophage, Anaprox DS, trazodone 50 mg nightly, Klonopin and Prilosec.    Review of Systems   Constitutional: Positive for malaise/fatigue.   Eyes: Positive for photophobia.   Gastrointestinal: Negative for constipation.   Genitourinary: Negative for frequency.   Neurological: Positive for tingling, sensory change, focal weakness, weakness and headaches. Negative for tremors, speech change and loss of consciousness.   Psychiatric/Behavioral: Negative for memory loss.   All other systems reviewed and are negative.       Objective:     /74 (BP Location: Left arm, Patient Position: Sitting, BP Cuff Size: Adult)   Pulse 75   Temp 36.9 °C (98.4 °F) (Temporal)   Resp 16   Ht 1.575 m (5' 2.01\")   Wt 90.7 kg (200 lb)   SpO2 98%   BMI 36.57 kg/m²      Physical Exam    She appears in no acute distress.  Vital signs are stable.  There is no malar rash or sialorrhea.  There is no jaw claudication or temporal tenderness.  The neck is supple, range of motion is full.  Cardiac evaluation is unremarkable.    Cognition is intact, there is no focal language deficit, she is fully oriented.    PERRLA/EOMI facial movements are symmetric without bulbar dysfunction, the tongue and uvula are midline.  Sensory exam is intact to temperature bilaterally.  Shoulder shrug and head rotation are intact and symmetric.    Musculoskeletal exam does reveal a quadriparesis, lower extremities more involved in upper extremities, distal greater than proximal " musculatures.  There is some weakness with wrist extension as well as the hand intrinsic musculatures.  In the lower extremities there is notable weakness with the feet and to a degree quadriceps and hamstrings bilaterally.  Reflexes are absent at the ankles and knees, trace present at the biceps and brachioradialis.  There are no pathologic reflexes.    Repetitive movements are slowed in proportion to the degree of weakness in the lower extremities, these are for the most part intact in the upper extremities.  There is no appendicular dystaxia with the upper extremities, weakness in the lower extremities limits the assessment.  She stands slowly, there is a bit of foot drop when she walks, she does look at the ground continuously.    Sensory exam reveals a stocking pattern loss of vibration below the knees bilaterally, this is intact in the hands.  Temperature is minimally diminished in the hands.     Assessment/Plan:     1. Intractable migraine without aura and without status migrainosus  For now we can treat this aggressively, she is evolving into chronic migraine.  Trokendi XR 50 mg every evening will be started, increased to 100 mg in 2 weeks.  Side effects were reviewed.  For rescue, Zomig-ZMT 5 mg tablets will be prescribed, she was told to use this as soon as a severe headache attack begins, this can be repeated in 1 hour if needed.  Side effects were reviewed.  The rationale for these treatment recommendations involving both maintenance as well as rescue was reviewed.  He feels comfortable with this, we will play phone contact to adjust medicines or consider other treatments if these prove ineffective or poorly tolerated.    - zolmitriptan (ZOMIG ZMT) 5 MG disintegrating tablet; 1 tab at headache onset; repeat in 1 hour prn.  Dispense: 9 Tab; Refill: 6  - TROKENDI XR 50 MG CAPSULE SR 24 HR; Take 1 Tab by mouth every evening. Increase in 2 weeks to 2 tab qhs  Dispense: 60 Cap; Refill: 2  - TROKENDI  MG  CAPSULE SR 24 HR; Take 1 Tab by mouth every evening.  Dispense: 30 Cap; Refill: 11    2. Charcot-Micehlle-Tooth disease  Diagnosis is established based on genetics screen results.  There is no reason to believe that she also has a superimposed CIDP, so CSF studies are obviated.  She feels comfortable with this.  A TENS unit will be prescribed to hopefully provide some additional pain relief in the lower extremities.  The Anaprox will be stopped, ineffective for this type of pain, as well it is not providing much benefit for her headaches.  We will follow-up in 6 months, phone contact in the interim.    - Tens Unit    Time: 25 minutes spent face-to-face for exam, review, discussion, and education, of this over 50% of the time spent counseling and coordinating care.

## 2019-09-16 ENCOUNTER — PATIENT MESSAGE (OUTPATIENT)
Dept: SLEEP MEDICINE | Facility: MEDICAL CENTER | Age: 34
End: 2019-09-16

## 2019-09-16 DIAGNOSIS — G47.33 OBSTRUCTIVE SLEEP APNEA: ICD-10-CM

## 2019-09-16 NOTE — TELEPHONE ENCOUNTER
From: Jennifer Gee  To: Juan Daniel Farrell M.D.  Sent: 9/16/2019 9:43 AM PDT  Subject: Procedure Question    Hello, I just had surgery that removed my tonsils and uvula. Plus they fixed my nose and removed some bone that was blocking my airway. My ent dr said I should have a sleep study done to confirm if the sleep apnea is gone. The surgery was done 8/5/2019 so I will need the sleep study in November. Can I schedule this now? For a Friday or Saturday?

## 2019-09-23 ENCOUNTER — OFFICE VISIT (OUTPATIENT)
Dept: MEDICAL GROUP | Facility: CLINIC | Age: 34
End: 2019-09-23
Payer: COMMERCIAL

## 2019-09-23 VITALS
DIASTOLIC BLOOD PRESSURE: 82 MMHG | OXYGEN SATURATION: 98 % | WEIGHT: 198 LBS | TEMPERATURE: 98.3 F | HEART RATE: 71 BPM | BODY MASS INDEX: 36.44 KG/M2 | SYSTOLIC BLOOD PRESSURE: 120 MMHG | HEIGHT: 62 IN | RESPIRATION RATE: 18 BRPM

## 2019-09-23 DIAGNOSIS — E78.2 MIXED HYPERLIPIDEMIA: ICD-10-CM

## 2019-09-23 DIAGNOSIS — G47.00 INSOMNIA, UNSPECIFIED TYPE: ICD-10-CM

## 2019-09-23 DIAGNOSIS — G47.33 OSA (OBSTRUCTIVE SLEEP APNEA): ICD-10-CM

## 2019-09-23 DIAGNOSIS — G60.0 CHARCOT-MARIE-TOOTH DISEASE OF NEURONAL TYPE: ICD-10-CM

## 2019-09-23 DIAGNOSIS — E28.2 PCOS (POLYCYSTIC OVARIAN SYNDROME): ICD-10-CM

## 2019-09-23 PROCEDURE — 99214 OFFICE O/P EST MOD 30 MIN: CPT | Performed by: PHYSICIAN ASSISTANT

## 2019-09-23 RX ORDER — TOPIRAMATE 25 MG/1
TABLET ORAL
COMMUNITY
Start: 2019-08-29 | End: 2019-12-17

## 2019-09-23 RX ORDER — METFORMIN HYDROCHLORIDE 500 MG/1
1000 TABLET, EXTENDED RELEASE ORAL 2 TIMES DAILY
Qty: 360 TAB | Refills: 1 | Status: SHIPPED | OUTPATIENT
Start: 2019-09-23 | End: 2020-02-26

## 2019-09-23 RX ORDER — TRAZODONE HYDROCHLORIDE 100 MG/1
100 TABLET ORAL
Qty: 30 TAB | Refills: 2 | Status: SHIPPED | OUTPATIENT
Start: 2019-09-23 | End: 2020-01-03

## 2019-09-23 NOTE — ASSESSMENT & PLAN NOTE
This is a chronic problem, previously treated with CPAP and trazodone which this patient was receiving from her psychiatrist however, she recently changed psychiatrists and her new provider does not wish to continue prescribing trazodone.  So she presents today to request refills of 100 mg trazodone which she denies any side effects from and seems to work very well for her.

## 2019-09-23 NOTE — PROGRESS NOTES
Chief Complaint   Patient presents with   • Employment Physical     work physical    • Sleep Problem     pt would like refill of trazodone 50 mgs        HISTORY OF PRESENT ILLNESS: Patient is a 34 y.o. female established patient who presents today for evaluation and management of:    PCOS (polycystic ovarian syndrome)  Patient has been taking metformin recently and since starting this, she has steadily lost weight or at least maintained the same weight. She was started on metformin due to the weight gain from PCOS by her gynecologist. She is requesting refills today and due to diarrhea, wishes to try the extended release version of this medicine.     MELISSA (obstructive sleep apnea)  Recently had oral surgery to remove her uvula, tonsils etc and is sleeping much better now.     Insomnia  This is a chronic problem, previously treated with CPAP and trazodone which this patient was receiving from her psychiatrist however, she recently changed psychiatrists and her new provider does not wish to continue prescribing trazodone.  So she presents today to request refills of 100 mg trazodone which she denies any side effects from and seems to work very well for her.    Charcot-Michelle-Tooth disease of neuronal type  This is a relatively new diagnosis by this patient neurologist, Dr. Culp.       Patient Active Problem List    Diagnosis Date Noted   • 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   • Insomnia 09/23/2019   • MELISSA (obstructive sleep apnea) 03/11/2019   • Non-smoker 03/11/2019   • Gastroesophageal reflux disease 03/11/2019   • CIDP (chronic inflammatory demyelinating polyneuropathy) ([facility]) 03/06/2018   • Chronic use of benzodiazepine for therapeutic purpose 03/06/2018   • Nonspecific abnormal electromyogram (EMG) 03/06/2018   • Obesity (BMI 35.0-39.9 without comorbidity) ([facility]) 03/06/2018   • Mild intermittent asthma without complication 01/22/2018   • Rash of back 10/12/2017   • Acute recurrent sinusitis  2017   • Bipolar affective disorder (HCC) 2016   • PCOS (polycystic ovarian syndrome) 2016   • Acne vulgaris 2016   • Intractable migraine without aura and without status migrainosus 2014       Allergies:Patient has no known allergies.    Current Outpatient Medications   Medication Sig Dispense Refill   • traZODone (DESYREL) 100 MG Tab Take 1 Tab by mouth every bedtime. 30 Tab 2   • metFORMIN ER (GLUCOPHAGE XR) 500 MG TABLET SR 24 HR Take 2 Tabs by mouth 2 times a day. 360 Tab 1   • zolmitriptan (ZOMIG ZMT) 5 MG disintegrating tablet 1 tab at headache onset; repeat in 1 hour prn. 9 Tab 6   • naproxen sodium (ANAPROX) 550 MG tablet TAKE ONE TABLET BY MOUTH TWICE A DAY WITH MEALS 60 Tab 2   • omeprazole (PRILOSEC) 20 MG delayed-release capsule Take 1 Cap by mouth every day. 90 Cap 3   • albuterol 108 (90 Base) MCG/ACT Aero Soln inhalation aerosol Inhale 2 Puffs by mouth every 6 hours as needed for Shortness of Breath. 8.5 g 4   • TROKENDI XR 50 MG CAPSULE SR 24 HR Take 1 Tab by mouth every evening. Increase in 2 weeks to 2 tab qhs (Patient not taking: Reported on 2019) 60 Cap 2   • TROKENDI  MG CAPSULE SR 24 HR Take 1 Tab by mouth every evening. (Patient not taking: Reported on 2019) 30 Cap 11   • clonazepam (KLONOPIN) 0.5 MG TABS Take 0.25 mg by mouth 2 times a day.       No current facility-administered medications for this visit.        Social History     Tobacco Use   • Smoking status: Never Smoker   • Smokeless tobacco: Never Used   Substance Use Topics   • Alcohol use: No   • Drug use: Yes     Types: Inhaled, Marijuana     Comment: medical card, 3 puffs/day       Family Status   Relation Name Status   • Mo  Alive   • Fa     • MGFa  (Not Specified)   • Neg Hx  (Not Specified)     Family History   Problem Relation Age of Onset   • Hyperlipidemia Mother    • Hypertension Mother    • Alcohol/Drug Father    • Heart Failure Father    • Heart Disease Maternal  "Grandfather    • Cancer Maternal Grandfather         Colon   • Stroke Neg Hx    • Diabetes Neg Hx        Review of Systems: See HPI above.   Constitutional: Negative for fever, chills, unintentional weight loss and malaise.   HENT: Negative for ear pain, nosebleeds, congestion, sore throat and neck pain.    Eyes: Negative for blurred vision.   Respiratory: Negative for shortness of breath, cough, sputum production and wheezing.    Cardiovascular: Negative for chest pain, palpitations, orthopnea and leg swelling.   Gastrointestinal: Negative for heartburn, nausea, vomiting and abdominal pain.   Genitourinary: Negative for dysuria, urgency and frequency.   Musculoskeletal: Negative for myalgias, back pain and joint pain.   Skin: Negative for rash and itching.   Neurological: positive for dizziness, tingling, sensory change and headaches.   Endo/Heme/Allergies: Does not bruise/bleed easily.   Psychiatric/Behavioral: positive for depression without suicidal ideas and memory loss.  The patient is nervous/anxious and does have insomnia.      Exam:  /82 (BP Location: Left arm, Patient Position: Sitting, BP Cuff Size: Large adult)   Pulse 71   Temp 36.8 °C (98.3 °F) (Temporal)   Resp 18   Ht 1.575 m (5' 2\")   Wt 89.8 kg (198 lb)   SpO2 98%   Body mass index is 36.21 kg/m².  General:  Obese female in NAD  Head: is grossly normal.  Neck: Supple without masses. Thyroid is not visibly enlarged.  Pulmonary: Normal effort.   Cardiovascular: Carotid pulses are intact and equal bilaterally.  Extremities: no clubbing, cyanosis, or edema.  Behavioral: patinet is slightly nervous although has normal mood and affect and normal judgement and insight. .     Medical decision-making and discussion:  1. Insomnia, unspecified type    - traZODone (DESYREL) 100 MG Tab; Take 1 Tab by mouth every bedtime.  Dispense: 30 Tab; Refill: 2    2. Charcot-Michelle-Tooth disease of neuronal type  Continue follow up with neurology.     3. PCOS " (polycystic ovarian syndrome)    - metFORMIN ER (GLUCOPHAGE XR) 500 MG TABLET SR 24 HR; Take 2 Tabs by mouth 2 times a day.  Dispense: 360 Tab; Refill: 1    4. Mixed hyperlipidemia    - Lipid Profile; Future    5. MELISSA (obstructive sleep apnea)  Continue pulmonology follow up.       Please note that this dictation was created using voice recognition software. I have made every reasonable attempt to correct obvious errors, but I expect that there are errors of grammar and possibly content that I did not discover before finalizing the note.      No follow-ups on file.

## 2019-09-23 NOTE — ASSESSMENT & PLAN NOTE
Patient has been taking metformin recently and since starting this, she has steadily lost weight or at least maintained the same weight. She was started on metformin due to the weight gain from PCOS by her gynecologist. She is requesting refills today and due to diarrhea, wishes to try the extended release version of this medicine.

## 2019-09-25 ENCOUNTER — TELEPHONE (OUTPATIENT)
Dept: NEUROLOGY | Facility: MEDICAL CENTER | Age: 34
End: 2019-09-25

## 2019-09-25 ENCOUNTER — PATIENT MESSAGE (OUTPATIENT)
Dept: MEDICAL GROUP | Facility: CLINIC | Age: 34
End: 2019-09-25

## 2019-09-26 ENCOUNTER — HOME STUDY (OUTPATIENT)
Dept: SLEEP MEDICINE | Facility: MEDICAL CENTER | Age: 34
End: 2019-09-26
Attending: INTERNAL MEDICINE
Payer: COMMERCIAL

## 2019-09-26 DIAGNOSIS — G47.33 OBSTRUCTIVE SLEEP APNEA: ICD-10-CM

## 2019-09-26 PROCEDURE — 95806 SLEEP STUDY UNATT&RESP EFFT: CPT | Performed by: INTERNAL MEDICINE

## 2019-09-26 NOTE — TELEPHONE ENCOUNTER
Please try to find out what exactly this coding error was all about so that we can move forward on getting her a TENS unit as soon as possible.

## 2019-09-27 NOTE — PROCEDURES
Patient is a 34-year-old female with history of MELISSA, on CPAP therapy.  She underwent UPPP for treatment.  Follow-up polysomnography is requested for assessment of MELISSA.    A total recording time of 581 minutes was obtained with good-quality data noted.    There were 351 snore episodes noted, or 20% of the night.  In the supine position the AHI was 5.6/h and associated with oxygen desaturations to a rebeca of 82%.  She spent 77 minutes overall, or 13% of time in bed, below SPO2 of 90%.  Mean heart rate was 68 bpm.  In the side sleep position her AHI was <5.  Overall AHI was 3/h.    Interpretation:  Mild sleep apnea, AHI: 5.6, supine only, associated with desaturations to 82%.    Recommendations:  The patient's sleep apnea has shown significant improvement following UPPP. Positional modification therapy advised.  Alternatively, continuing CPAP or using an oral appliance could be considered.  Weight loss as well as avoidance/minimizing alcohol, sedatives and muscle relaxants can be of added therapeutic benefit.

## 2019-09-27 NOTE — TELEPHONE ENCOUNTER
tajted pt asking where she took the Rx for Tens Unit.   I was informed that they usually don't get approved by insurance and they cost about $60 cash.

## 2019-12-09 ENCOUNTER — PATIENT MESSAGE (OUTPATIENT)
Dept: MEDICAL GROUP | Facility: CLINIC | Age: 34
End: 2019-12-09

## 2019-12-09 DIAGNOSIS — G60.0 CHARCOT-MARIE-TOOTH DISEASE OF NEURONAL TYPE: ICD-10-CM

## 2019-12-09 NOTE — TELEPHONE ENCOUNTER
From: Jennifer Gee  To: Mair Cordova P.A.-C.  Sent: 12/9/2019 12:45 PM PST  Subject: Procedure Question    Hello I was wondering if I could get a referral for a Pain management dr? I spoke with someone at [facility] and they take my insurance. If you could can you send they a referral to fax number [phone] and last appointment notes along with my current list of medications please? It’s a long wait time to get an appointment with them so I thought I would try messaging you here in an attempt to speed up the process. It’s becoming more and more difficult to walk as well as to use my arms.

## 2019-12-17 ENCOUNTER — HOSPITAL ENCOUNTER (OUTPATIENT)
Facility: MEDICAL CENTER | Age: 34
End: 2019-12-17
Attending: PHYSICIAN ASSISTANT
Payer: COMMERCIAL

## 2019-12-17 ENCOUNTER — OFFICE VISIT (OUTPATIENT)
Dept: MEDICAL GROUP | Facility: CLINIC | Age: 34
End: 2019-12-17
Payer: COMMERCIAL

## 2019-12-17 ENCOUNTER — TELEPHONE (OUTPATIENT)
Dept: NEUROLOGY | Facility: MEDICAL CENTER | Age: 34
End: 2019-12-17

## 2019-12-17 VITALS
RESPIRATION RATE: 20 BRPM | TEMPERATURE: 97.6 F | HEIGHT: 62 IN | HEART RATE: 93 BPM | WEIGHT: 203.6 LBS | BODY MASS INDEX: 37.47 KG/M2 | SYSTOLIC BLOOD PRESSURE: 124 MMHG | OXYGEN SATURATION: 97 % | DIASTOLIC BLOOD PRESSURE: 76 MMHG

## 2019-12-17 DIAGNOSIS — N30.00 ACUTE CYSTITIS WITHOUT HEMATURIA: ICD-10-CM

## 2019-12-17 DIAGNOSIS — G43.019 INTRACTABLE MIGRAINE WITHOUT AURA AND WITHOUT STATUS MIGRAINOSUS: ICD-10-CM

## 2019-12-17 DIAGNOSIS — M54.50 LUMBAR BACK PAIN: ICD-10-CM

## 2019-12-17 DIAGNOSIS — R30.9 URINARY PAIN: Primary | ICD-10-CM

## 2019-12-17 PROBLEM — Z79.899 CHRONIC USE OF BENZODIAZEPINE FOR THERAPEUTIC PURPOSE: Status: RESOLVED | Noted: 2018-03-06 | Resolved: 2019-12-17

## 2019-12-17 LAB
APPEARANCE UR: NORMAL
BILIRUB UR STRIP-MCNC: NORMAL MG/DL
COLOR UR AUTO: YELLOW
GLUCOSE UR STRIP.AUTO-MCNC: NORMAL MG/DL
KETONES UR STRIP.AUTO-MCNC: NORMAL MG/DL
LEUKOCYTE ESTERASE UR QL STRIP.AUTO: NORMAL
NITRITE UR QL STRIP.AUTO: NORMAL
PH UR STRIP.AUTO: 8 [PH] (ref 5–8)
PROT UR QL STRIP: NORMAL MG/DL
RBC UR QL AUTO: NORMAL
SP GR UR STRIP.AUTO: 1.01
UROBILINOGEN UR STRIP-MCNC: NORMAL MG/DL

## 2019-12-17 PROCEDURE — 99213 OFFICE O/P EST LOW 20 MIN: CPT | Performed by: PHYSICIAN ASSISTANT

## 2019-12-17 PROCEDURE — 87086 URINE CULTURE/COLONY COUNT: CPT

## 2019-12-17 PROCEDURE — 81002 URINALYSIS NONAUTO W/O SCOPE: CPT | Performed by: PHYSICIAN ASSISTANT

## 2019-12-17 PROCEDURE — 81001 URINALYSIS AUTO W/SCOPE: CPT

## 2019-12-17 RX ORDER — NITROFURANTOIN 25; 75 MG/1; MG/1
100 CAPSULE ORAL EVERY 12 HOURS
Qty: 10 CAP | Refills: 0 | Status: SHIPPED | OUTPATIENT
Start: 2019-12-17 | End: 2019-12-22

## 2019-12-17 RX ORDER — TIZANIDINE 4 MG/1
2-4 TABLET ORAL EVERY 6 HOURS PRN
Qty: 30 TAB | Refills: 3 | Status: SHIPPED
Start: 2019-12-17 | End: 2020-07-27

## 2019-12-17 RX ORDER — PHENAZOPYRIDINE HYDROCHLORIDE 200 MG/1
200 TABLET, FILM COATED ORAL 3 TIMES DAILY PRN
Qty: 24 TAB | Refills: 0 | Status: SHIPPED | OUTPATIENT
Start: 2019-12-17 | End: 2020-01-29

## 2019-12-17 ASSESSMENT — PAIN SCALES - GENERAL: PAINLEVEL: NO PAIN

## 2019-12-17 NOTE — PROGRESS NOTES
"  Chief Complaint   Patient presents with   • Back Pain     kidney-Lt side       HPI:  Symptom onset: 14 days ago   Current symptoms: Painful, urgent, frequent voids. No blood noted in urine.  Since onset symptoms are: Unchanged  Treatments tried: OTC antispasm med.  Associated symptoms: Negative for fever, flank pain, nausea and vomiting, vaginal discharge, pelvic pain.  History is negative for frequent UTI.     ROS:  Denies fever, chills, vomiting or abdominal pain.     OBJECTIVE:  /76 (BP Location: Right arm, Patient Position: Sitting, BP Cuff Size: Adult)   Pulse 93   Temp 36.4 °C (97.6 °F) (Temporal)   Resp 20   Ht 1.575 m (5' 2\")   Wt 92.4 kg (203 lb 9.6 oz)   SpO2 97%   Gen: Alert, NAD.  Chest: Lungs clear to auscultation, CV RRR.  Abdomen: Soft, tender in suprapubic region. No CVAT. Normal bowel sounds.     Lab Results   Component Value Date    POCCOLOR yellow 12/17/2019    POCAPPEAR slightly cloudy 12/17/2019    POCLEUKEST large 12/17/2019    POCNITRITE neg 12/17/2019    POCUROBILIGE neg 12/17/2019    POCPROTEIN trace 12/17/2019    POCURPH 8.0 12/17/2019    POCBLOOD neg 12/17/2019    POCSPGRV 1.010 12/17/2019    POCKETONES neg 12/17/2019    POCBILIRUBIN neg 12/17/2019    POCGLUCUA neg 12/17/2019          ASSESSMENT/PLAN:     1. Urinary pain    2. Acute cystitis without hematuria    3. Lumbar back pain          1. Abnormal urine dipstick in office. Urine sent for culture. Start antibiotics.  2. Provided education to drink plenty of fluids, wipe front to back every void and bowel movement.   3. Return to clinic if symptoms not improving within 3-4 days or in case of vomiting, fever, increasing pain.  "

## 2019-12-18 ENCOUNTER — TELEPHONE (OUTPATIENT)
Dept: NEUROLOGY | Facility: MEDICAL CENTER | Age: 34
End: 2019-12-18

## 2019-12-18 LAB
APPEARANCE UR: ABNORMAL
BACTERIA #/AREA URNS HPF: ABNORMAL /HPF
BILIRUB UR QL STRIP.AUTO: NEGATIVE
COLOR UR: YELLOW
EPI CELLS #/AREA URNS HPF: ABNORMAL /HPF
GLUCOSE UR STRIP.AUTO-MCNC: NEGATIVE MG/DL
HYALINE CASTS #/AREA URNS LPF: ABNORMAL /LPF
KETONES UR STRIP.AUTO-MCNC: NEGATIVE MG/DL
LEUKOCYTE ESTERASE UR QL STRIP.AUTO: ABNORMAL
MICRO URNS: ABNORMAL
NITRITE UR QL STRIP.AUTO: NEGATIVE
PH UR STRIP.AUTO: 7.5 [PH] (ref 5–8)
PROT UR QL STRIP: NEGATIVE MG/DL
RBC # URNS HPF: ABNORMAL /HPF
RBC UR QL AUTO: NEGATIVE
SP GR UR STRIP.AUTO: 1.01
UROBILINOGEN UR STRIP.AUTO-MCNC: 0.2 MG/DL
WBC #/AREA URNS HPF: ABNORMAL /HPF

## 2019-12-18 NOTE — TELEPHONE ENCOUNTER
----- Message from Jennifer Gee sent at 12/15/2019  7:56 AM PST -----  Regarding: Prescription Question  Contact: [phone]  I am still experiencing leg cramping. I feel like my legs are curling when I sleep but stuck in a cramping position. It used to be mainly my right side experiencing the pain but the left side has started its journey. In between my shoulder blades is a pinched nerve on both sides. Naproxen and ibprophen is not helping to reduce the swelling. Can you send me some kind of muscle relaxer so the muscles can relax and the swelling can have a chance to go down enough for my nerve to unpinch and allow me to move my arms please?      Please advise.

## 2019-12-18 NOTE — TELEPHONE ENCOUNTER
----- Message from Jennifer Gee sent at 12/15/2019  7:47 AM PST -----  Regarding: Prescription Question  Contact: [phone]  I wanted to tell you what happened with Quinten. At the 50mg dose for five days: I could barely see the part numbers on the screen at work, fluid build up behind the eyes. My face broke out with weird sores filled with a clear liquid I mean like a patch on my chin the size of a Nickel but budging around it to be about the size of a quarter. I had speech issues pronouncing words. Clammy fever, and constipation. I had muscle aches and weakness to the point I couldn’t walk around [employer] [facility] without holding onto things. I took my self off and am doing better. But day three without it: the right side of my face and head felt swollen to the point my eyelid felt heavy. Then my neck shoulders back and arms went stiff. Until I vomited 2x.

## 2019-12-18 NOTE — TELEPHONE ENCOUNTER
"Dr. Ayala,    Patient reached out to us Via [employer] and stated this:    \"I wanted to tell you what happened with Quinten. At the 50mg dose for five days: I could barely see the part numbers on the screen at work, fluid build up behind the eyes. My face broke out with weird sores filled with a clear liquid I mean like a patch on my chin the size of a Nickel but budging around it to be about the size of a quarter. I had speech issues pronouncing words. Clammy fever, and constipation. I had muscle aches and weakness to the point I couldn’t walk around [employer] [facility] without holding onto things. I took my self off and am doing better. But day three without it: the right side of my face and head felt swollen to the point my eyelid felt heavy. Then my neck shoulders back and arms went stiff. Until I vomited 2x. \"  Please advise.   "

## 2019-12-18 NOTE — TELEPHONE ENCOUNTER
I am not sure how to explain these facial symptoms she is still having, though it could be a sustained issue still with the Topamax, which may take more than 1 week or so to fully resolve.  The longer she is off the Topamax, if these right sided facial symptoms continue to worsen, she needs to go to an emergency room.

## 2019-12-19 ENCOUNTER — HOSPITAL ENCOUNTER (OUTPATIENT)
Dept: RADIOLOGY | Facility: MEDICAL CENTER | Age: 34
End: 2019-12-19
Attending: PHYSICIAN ASSISTANT
Payer: COMMERCIAL

## 2019-12-19 DIAGNOSIS — M54.50 LUMBAR BACK PAIN: ICD-10-CM

## 2019-12-19 PROCEDURE — 72070 X-RAY EXAM THORAC SPINE 2VWS: CPT

## 2019-12-19 PROCEDURE — 72100 X-RAY EXAM L-S SPINE 2/3 VWS: CPT

## 2019-12-19 NOTE — TELEPHONE ENCOUNTER
I hate to say this, but it sounds as if the symptoms she is dealing with are quite severe, a little unusual to be this severe of this long after she has stopped the drug.  She needs to be seen in an urgent care center or emergency room for appropriate treatment.  Pills, etc. even by prescription are not likely going to give her what help she needs.

## 2019-12-20 LAB
BACTERIA UR CULT: NORMAL
SIGNIFICANT IND 70042: NORMAL
SITE SITE: NORMAL
SOURCE SOURCE: NORMAL

## 2019-12-23 ENCOUNTER — PATIENT MESSAGE (OUTPATIENT)
Dept: MEDICAL GROUP | Facility: CLINIC | Age: 34
End: 2019-12-23

## 2019-12-23 DIAGNOSIS — G62.9 POLYNEUROPATHY: ICD-10-CM

## 2019-12-23 DIAGNOSIS — G60.0 CHARCOT-MARIE-TOOTH DISEASE OF NEURONAL TYPE: ICD-10-CM

## 2019-12-23 DIAGNOSIS — M54.50 LUMBAR BACK PAIN: ICD-10-CM

## 2019-12-23 DIAGNOSIS — R94.131 NONSPECIFIC ABNORMAL ELECTROMYOGRAM (EMG): ICD-10-CM

## 2019-12-23 NOTE — PATIENT COMMUNICATION
Phone Number Called: [phone] (home)   Call outcome: spoke to patient regarding message below    Message: patient advised and fv scheduled.

## 2019-12-27 ENCOUNTER — TELEPHONE (OUTPATIENT)
Dept: NEUROLOGY | Facility: MEDICAL CENTER | Age: 34
End: 2019-12-27

## 2019-12-31 ENCOUNTER — TELEPHONE (OUTPATIENT)
Dept: NEUROLOGY | Facility: MEDICAL CENTER | Age: 34
End: 2019-12-31

## 2019-12-31 NOTE — TELEPHONE ENCOUNTER
"Regarding: RE:Reply  Contact: [phone]  ----- Message from Shyam WASHINGTON Boyleflipyuni, Med Ass't sent at 12/23/2019  8:34 AM PST -----       ----- Message from Jennifer Gee to Shyam Ramirez, Med Ass't sent at 12/20/2019  5:35 PM -----   Tell him I waited to start the trokendi until this month. I did go to my pcp and I also got a uti from the trokendi. Dr Cordova gave me antibiotics. But I’m having issues with my lower back. She sent me for xrays. It’s in my my chart  ----- Message -----  From: Carlosshruti Ramirez, Med Ass't  Sent: 12/20/2019  5:10 PM PST  To: Jennifer Gee  Subject: Reply  Tsering Rodriguez,    Here is what Dr. Ayala stated about your message you had left us the other day on Mychart.    I hate to say this, but it sounds as if the symptoms she is dealing with are quite severe, a little unusual to be this severe of this long after she has stopped the drug.  She needs to be seen in an urgent care center or emergency room for appropriate treatment.  Pills, etc. even by prescription are not likely going to give her what help she needs.\"    If you have any questions please feel free to send us another Mychart message.  Thank you,  Shyam Ramirez MA    "

## 2019-12-31 NOTE — TELEPHONE ENCOUNTER
Tell Jennifer thank you very much for the update on her condition, she knows to follow through with her PCP about test results ordered through her office.

## 2020-01-03 DIAGNOSIS — G47.00 INSOMNIA, UNSPECIFIED TYPE: ICD-10-CM

## 2020-01-03 NOTE — TELEPHONE ENCOUNTER
Was the patient seen in the last year in this department? Yes    Does patient have an active prescription for medications requested? Yes    Received Request Via: Pharmacy    Hospital Outpatient Visit on 12/17/2019   Component Date Value   • Color 12/17/2019 Yellow    • Character 12/17/2019 Cloudy*   • Specific Gravity 12/17/2019 1.015    • Ph 12/17/2019 7.5    • Glucose 12/17/2019 Negative    • Ketones 12/17/2019 Negative    • Protein 12/17/2019 Negative    • Bilirubin 12/17/2019 Negative    • Urobilinogen, Urine 12/17/2019 0.2    • Nitrite 12/17/2019 Negative    • Leukocyte Esterase 12/17/2019 Small*   • Occult Blood 12/17/2019 Negative    • Micro Urine Req 12/17/2019 Microscopic    • WBC 12/17/2019 10-20*   • RBC 12/17/2019 2-5*   • Bacteria 12/17/2019 Few*   • Epithelial Cells 12/17/2019 Moderate*   • Hyaline Cast 12/17/2019 3-5*   • Significant Indicator 12/17/2019 NEG    • Source 12/17/2019 UR    • Site 12/17/2019 -    • Culture Result 12/17/2019 Mixed skin cody 10-50,000 cfu/mL    Office Visit on 12/17/2019   Component Date Value   • POC Color 12/17/2019 yellow    • POC Appearance 12/17/2019 slightly cloudy    • POC Leukocyte Esterase 12/17/2019 large    • POC Nitrites 12/17/2019 neg    • POC Urobiligen 12/17/2019 neg    • POC Protein 12/17/2019 trace    • POC Urine PH 12/17/2019 8.0    • POC Blood 12/17/2019 neg    • POC Specific Gravity 12/17/2019 1.010    • POC Ketones 12/17/2019 neg    • POC Bilirubin 12/17/2019 neg    • POC Glucose 12/17/2019 neg    Hospital Outpatient Visit on 03/12/2019   Component Date Value   • HIV Ag/Ab Combo Assay 03/12/2019 Non Reactive    • Hepatitis B Surface Anti* 03/12/2019 Negative    • Hepatitis B Cors Ab,IgM 03/12/2019 Negative    • Hepatitis A Virus Ab, IgM 03/12/2019 Negative    • Hepatitis C Antibody 03/12/2019 Negative    • Syphilis, Treponemal Qual 03/12/2019 Non Reactive    • Sodium 03/12/2019 141    • Potassium 03/12/2019 4.2    • Chloride 03/12/2019 107    • Co2  03/12/2019 23    • Anion Gap 03/12/2019 11.0    • Glucose 03/12/2019 82    • Bun 03/12/2019 14    • Creatinine 03/12/2019 0.61    • Calcium 03/12/2019 9.1    • AST(SGOT) 03/12/2019 14    • ALT(SGPT) 03/12/2019 16    • Alkaline Phosphatase 03/12/2019 52    • Total Bilirubin 03/12/2019 0.4    • Albumin 03/12/2019 4.3    • Total Protein 03/12/2019 7.0    • Globulin 03/12/2019 2.7    • A-G Ratio 03/12/2019 1.6    • Glycohemoglobin 03/12/2019 5.2    • Est Avg Glucose 03/12/2019 103    • Cholesterol,Tot 03/12/2019 167    • Triglycerides 03/12/2019 93    • HDL 03/12/2019 32*   • LDL 03/12/2019 116*   • GFR If  03/12/2019 >60    • GFR If Non  Ameri* 03/12/2019 >60    Hospital Outpatient Visit on 03/01/2019   Component Date Value   • Cytology Reg 03/01/2019 See Path Report    • Source 03/01/2019 Cervical    • HPV Genotype 16 03/01/2019 Negative    • HPV Genotype 18 03/01/2019 Negative    • HPV Other High Risk Sonya* 03/01/2019 Negative    • C. trachomatis by PCR 03/01/2019 Negative    • N. gonorrhoeae by PCR 03/01/2019 Negative    • Source 03/01/2019 Cervical    ]

## 2020-01-06 RX ORDER — TRAZODONE HYDROCHLORIDE 100 MG/1
TABLET ORAL
Qty: 30 TAB | Refills: 0 | Status: SHIPPED | OUTPATIENT
Start: 2020-01-06 | End: 2020-02-07

## 2020-01-08 ENCOUNTER — TELEPHONE (OUTPATIENT)
Dept: MEDICAL GROUP | Facility: CLINIC | Age: 35
End: 2020-01-08

## 2020-01-08 DIAGNOSIS — M54.50 LUMBAR SPINE PAINFUL ON MOVEMENT: ICD-10-CM

## 2020-01-08 DIAGNOSIS — M54.6 THORACIC SPINE PAIN: ICD-10-CM

## 2020-01-08 NOTE — TELEPHONE ENCOUNTER
VOICEMAIL  1. Caller Name: Edilia from [facility]                         Call Back Number: [phone]    2. Message: Patients insurance denied the Spine MRI but will cover the Brain MRI. Imaging would like you to call them to let them know how you would like to proceed. They scanned denial into patients chart for your information.     3. Patient approves office to leave a detailed voicemail/MyChart message: N\A

## 2020-01-08 NOTE — TELEPHONE ENCOUNTER
I have attempted to re-code for these imaging orders to see if they are covered under different orders.

## 2020-01-15 ENCOUNTER — HOSPITAL ENCOUNTER (OUTPATIENT)
Dept: RADIOLOGY | Facility: MEDICAL CENTER | Age: 35
End: 2020-01-15
Attending: PHYSICIAN ASSISTANT
Payer: COMMERCIAL

## 2020-01-17 ENCOUNTER — TELEPHONE (OUTPATIENT)
Dept: MEDICAL GROUP | Facility: CLINIC | Age: 35
End: 2020-01-17

## 2020-01-17 NOTE — TELEPHONE ENCOUNTER
VOICEMAIL  1. Caller Name: Lisa from [employer]                      Call Back Number: ext [phone]    2. Message: Patient needs a peer to peer conducted for her MRI order, please call [phone] before January 22 at 1pm.     3. Patient approves office to leave a detailed voicemail/MyChart message: N\A

## 2020-01-20 ENCOUNTER — APPOINTMENT (OUTPATIENT)
Dept: RADIOLOGY | Facility: MEDICAL CENTER | Age: 35
End: 2020-01-20
Attending: PHYSICAL MEDICINE & REHABILITATION
Payer: COMMERCIAL

## 2020-01-20 DIAGNOSIS — G43.819 OTHER MIGRAINE WITHOUT STATUS MIGRAINOSUS, INTRACTABLE: ICD-10-CM

## 2020-01-20 PROCEDURE — 70553 MRI BRAIN STEM W/O & W/DYE: CPT

## 2020-01-20 PROCEDURE — 700117 HCHG RX CONTRAST REV CODE 255: Performed by: OBSTETRICS & GYNECOLOGY

## 2020-01-20 PROCEDURE — A9576 INJ PROHANCE MULTIPACK: HCPCS | Performed by: OBSTETRICS & GYNECOLOGY

## 2020-01-20 RX ADMIN — GADOTERIDOL 15 ML: 279.3 INJECTION, SOLUTION INTRAVENOUS at 15:31

## 2020-01-23 ENCOUNTER — APPOINTMENT (OUTPATIENT)
Dept: RADIOLOGY | Facility: MEDICAL CENTER | Age: 35
End: 2020-01-23
Attending: PHYSICIAN ASSISTANT
Payer: COMMERCIAL

## 2020-01-29 ENCOUNTER — HOSPITAL ENCOUNTER (OUTPATIENT)
Facility: MEDICAL CENTER | Age: 35
End: 2020-01-29
Attending: PHYSICIAN ASSISTANT
Payer: COMMERCIAL

## 2020-01-29 ENCOUNTER — OFFICE VISIT (OUTPATIENT)
Dept: NEUROLOGY | Facility: MEDICAL CENTER | Age: 35
End: 2020-01-29
Payer: COMMERCIAL

## 2020-01-29 ENCOUNTER — NON-PROVIDER VISIT (OUTPATIENT)
Dept: MEDICAL GROUP | Facility: CLINIC | Age: 35
End: 2020-01-29
Payer: COMMERCIAL

## 2020-01-29 VITALS
SYSTOLIC BLOOD PRESSURE: 120 MMHG | OXYGEN SATURATION: 97 % | DIASTOLIC BLOOD PRESSURE: 82 MMHG | HEIGHT: 62 IN | WEIGHT: 203 LBS | HEART RATE: 72 BPM | BODY MASS INDEX: 37.36 KG/M2

## 2020-01-29 DIAGNOSIS — E78.2 MIXED HYPERLIPIDEMIA: ICD-10-CM

## 2020-01-29 DIAGNOSIS — G60.0 CHARCOT-MARIE-TOOTH DISEASE OF NEURONAL TYPE: ICD-10-CM

## 2020-01-29 DIAGNOSIS — G43.019 INTRACTABLE MIGRAINE WITHOUT AURA AND WITHOUT STATUS MIGRAINOSUS: Primary | ICD-10-CM

## 2020-01-29 LAB
CHOLEST SERPL-MCNC: 165 MG/DL (ref 100–199)
HDLC SERPL-MCNC: 28 MG/DL
LDLC SERPL CALC-MCNC: 110 MG/DL
TRIGL SERPL-MCNC: 135 MG/DL (ref 0–149)

## 2020-01-29 PROCEDURE — 80061 LIPID PANEL: CPT

## 2020-01-29 PROCEDURE — 99213 OFFICE O/P EST LOW 20 MIN: CPT | Performed by: PSYCHIATRY & NEUROLOGY

## 2020-01-29 PROCEDURE — 36415 COLL VENOUS BLD VENIPUNCTURE: CPT | Performed by: PHYSICIAN ASSISTANT

## 2020-01-29 RX ORDER — ELETRIPTAN HYDROBROMIDE 40 MG/1
TABLET, FILM COATED ORAL
Qty: 9 TAB | Refills: 4 | Status: SHIPPED | OUTPATIENT
Start: 2020-01-29 | End: 2021-03-16

## 2020-01-29 ASSESSMENT — ENCOUNTER SYMPTOMS
SENSORY CHANGE: 1
LOSS OF CONSCIOUSNESS: 0
MYALGIAS: 1
MEMORY LOSS: 0
FALLS: 0
HEADACHES: 1
SEIZURES: 0

## 2020-01-30 NOTE — PROGRESS NOTES
Subjective:      Jennifer Gee is a 34 y.o. 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    HPI    Migraine: When last seen in August, 2019, her headaches have been increasing to a degree that we felt maintenance therapies would be appropriate.  Though I had wanted to try Trokendi, it took several months for the drug to be authorized, and then when started, she developed a rather unusual picture of allergic reactions, etc.  Needless to say, the drug was discontinued quickly.  Zomig-ZMT 5 mg tablets never provided meaningful benefit, she did use the drug appropriately.    What she did notices that with the headache increase in August, things seem to spontaneously remit in September, November and now again in this month.  She still can have headaches but the severe attacks are much less frequent, her best months she may have 1 or 2 headaches in a week, the other months more than that.  She is not quite clear why this is happening.    Because of recurrent sinus problems, she had undergone sinus surgery during this time.  This did not seem to have any consistent benefit vis-à-vis the headaches.  MRI scan of the brain was done last week, I reviewed the images, the brain itself was completely normal.  Evidence of polyps was seen in the sinuses.    Charcot-Michelle-Tooth: She is still having significant neck and and bilateral extremity pain.  Now under the care of a local pain specialty group, they had wanted to prescribe Aimovig as part of their pain treatment, she never filled the prescription.  Evidently, her insurance company refused to pay for the drug, regardless.  She is now applying a topical diclofenac over the right shoulder which does help, she uses Zanaflex as needed for muscle spasm.  If she walks for too long, is excessively active physically, most of the muscle aches and pains can increase, the sensory distortions in the lower  "extremities as well.    Medical, surgical and family histories are reviewed in the electronic health record, there are no new drug allergies.  She is on Zanaflex 4 mg every 6 hours as needed, trazodone 100 mg nightly, Glucophage, Prilosec, and the diclofenac topical.    Review of Systems   Musculoskeletal: Positive for myalgias. Negative for falls.   Neurological: Positive for sensory change and headaches. Negative for seizures and loss of consciousness.   Psychiatric/Behavioral: Negative for memory loss.   All other systems reviewed and are negative.       Objective:     /82 (BP Location: Left arm, Patient Position: Sitting, BP Cuff Size: Adult)   Pulse 72   Ht 1.575 m (5' 2\")   Wt 92.1 kg (203 lb)   SpO2 97%   BMI 37.13 kg/m²      Physical Exam    She appears in no acute distress.  Vital signs are stable.  There is no malar rash.  The neck is supple.  Cardiac evaluation is unremarkable.    In quick and cursory fashion, with observation, her neurologic examination is essentially unchanged from that of August, 2019.     Assessment/Plan:     1. Intractable migraine without aura and without status migrainosus  Given that her risk of having the headaches recur spontaneously is quite high, Ajovy 225 mg monthly injections will be prescribed, it seems that her insurance will cover this medication, a co-pay card was provided.  Injection technique is critical for efficacy and tolerability.  Relpax 40 mg will be used as rescue instead of the Zomig.  Phone contact in the interim, we can follow-up in 6 months in regards to the her headaches.    - eletriptan (RELPAX) 40 MG tablet; 1 tab at headache onset; repeat in 1 hour prn  Dispense: 9 Tab; Refill: 4  - Fremanezumab-vfrm (AJOVY) 225 MG/1.5ML Solution Prefilled Syringe; Inject 225 mg as instructed Q 4 Weeks.  Dispense: 1 Syringe; Refill: 5    2. Charcot-Michelle-Tooth disease of neuronal type  Neurologically, things are about the same.  She will be following with her " pain specialist for symptomatic relief.    Time: 20-minute spent face-to-face for exam, review, discussion, and education, of this over 50% of the time spent counseling and coordinating care.

## 2020-02-10 ENCOUNTER — PATIENT MESSAGE (OUTPATIENT)
Dept: HEALTH INFORMATION MANAGEMENT | Facility: OTHER | Age: 35
End: 2020-02-10

## 2020-02-26 DIAGNOSIS — E28.2 PCOS (POLYCYSTIC OVARIAN SYNDROME): ICD-10-CM

## 2020-02-26 RX ORDER — OMEPRAZOLE 20 MG/1
CAPSULE, DELAYED RELEASE ORAL
Qty: 90 CAP | Refills: 0 | Status: SHIPPED | OUTPATIENT
Start: 2020-02-26 | End: 2020-03-05

## 2020-02-26 RX ORDER — METFORMIN HYDROCHLORIDE 500 MG/1
TABLET, EXTENDED RELEASE ORAL
Qty: 360 TAB | Refills: 0 | Status: SHIPPED | OUTPATIENT
Start: 2020-02-26 | End: 2020-03-17

## 2020-02-26 NOTE — TELEPHONE ENCOUNTER
Was the patient seen in the last year in this department? Yes    Does patient have an active prescription for medications requested? Yes    Received Request Via: Pharmacy    Hospital Outpatient Visit on 01/29/2020   Component Date Value   • Cholesterol,Tot 01/29/2020 165    • Triglycerides 01/29/2020 135    • HDL 01/29/2020 28*   • LDL 01/29/2020 110*   Hospital Outpatient Visit on 12/17/2019   Component Date Value   • Color 12/17/2019 Yellow    • Character 12/17/2019 Cloudy*   • Specific Gravity 12/17/2019 1.015    • Ph 12/17/2019 7.5    • Glucose 12/17/2019 Negative    • Ketones 12/17/2019 Negative    • Protein 12/17/2019 Negative    • Bilirubin 12/17/2019 Negative    • Urobilinogen, Urine 12/17/2019 0.2    • Nitrite 12/17/2019 Negative    • Leukocyte Esterase 12/17/2019 Small*   • Occult Blood 12/17/2019 Negative    • Micro Urine Req 12/17/2019 Microscopic    • WBC 12/17/2019 10-20*   • RBC 12/17/2019 2-5*   • Bacteria 12/17/2019 Few*   • Epithelial Cells 12/17/2019 Moderate*   • Hyaline Cast 12/17/2019 3-5*   • Significant Indicator 12/17/2019 NEG    • Source 12/17/2019 UR    • Site 12/17/2019 -    • Culture Result 12/17/2019 Mixed skin cody 10-50,000 cfu/mL    Office Visit on 12/17/2019   Component Date Value   • POC Color 12/17/2019 yellow    • POC Appearance 12/17/2019 slightly cloudy    • POC Leukocyte Esterase 12/17/2019 large    • POC Nitrites 12/17/2019 neg    • POC Urobiligen 12/17/2019 neg    • POC Protein 12/17/2019 trace    • POC Urine PH 12/17/2019 8.0    • POC Blood 12/17/2019 neg    • POC Specific Gravity 12/17/2019 1.010    • POC Ketones 12/17/2019 neg    • POC Bilirubin 12/17/2019 neg    • POC Glucose 12/17/2019 neg    Hospital Outpatient Visit on 03/12/2019   Component Date Value   • HIV Ag/Ab Combo Assay 03/12/2019 Non Reactive    • Hepatitis B Surface Anti* 03/12/2019 Negative    • Hepatitis B Cors Ab,IgM 03/12/2019 Negative    • Hepatitis A Virus Ab, IgM 03/12/2019 Negative    • Hepatitis C  Antibody 03/12/2019 Negative    • Syphilis, Treponemal Qual 03/12/2019 Non Reactive    • Sodium 03/12/2019 141    • Potassium 03/12/2019 4.2    • Chloride 03/12/2019 107    • Co2 03/12/2019 23    • Anion Gap 03/12/2019 11.0    • Glucose 03/12/2019 82    • Bun 03/12/2019 14    • Creatinine 03/12/2019 0.61    • Calcium 03/12/2019 9.1    • AST(SGOT) 03/12/2019 14    • ALT(SGPT) 03/12/2019 16    • Alkaline Phosphatase 03/12/2019 52    • Total Bilirubin 03/12/2019 0.4    • Albumin 03/12/2019 4.3    • Total Protein 03/12/2019 7.0    • Globulin 03/12/2019 2.7    • A-G Ratio 03/12/2019 1.6    • Glycohemoglobin 03/12/2019 5.2    • Est Avg Glucose 03/12/2019 103    • Cholesterol,Tot 03/12/2019 167    • Triglycerides 03/12/2019 93    • HDL 03/12/2019 32*   • LDL 03/12/2019 116*   • GFR If  03/12/2019 >60    • GFR If Non  Ameri* 03/12/2019 >60    Hospital Outpatient Visit on 03/01/2019   Component Date Value   • Cytology Reg 03/01/2019 See Path Report    • Source 03/01/2019 Cervical    • HPV Genotype 16 03/01/2019 Negative    • HPV Genotype 18 03/01/2019 Negative    • HPV Other High Risk Sonya* 03/01/2019 Negative    • C. trachomatis by PCR 03/01/2019 Negative    • N. gonorrhoeae by PCR 03/01/2019 Negative    • Source 03/01/2019 Cervical    ]

## 2020-03-05 RX ORDER — OMEPRAZOLE 20 MG/1
CAPSULE, DELAYED RELEASE ORAL
Qty: 90 CAP | Refills: 0 | Status: SHIPPED | OUTPATIENT
Start: 2020-03-05 | End: 2020-05-26

## 2020-03-05 NOTE — TELEPHONE ENCOUNTER
Was the patient seen in the last year in this department? Yes    Does patient have an active prescription for medications requested? Yes    Received Request Via: Pharmacy    Hospital Outpatient Visit on 01/29/2020   Component Date Value   • Cholesterol,Tot 01/29/2020 165    • Triglycerides 01/29/2020 135    • HDL 01/29/2020 28*   • LDL 01/29/2020 110*   Hospital Outpatient Visit on 12/17/2019   Component Date Value   • Color 12/17/2019 Yellow    • Character 12/17/2019 Cloudy*   • Specific Gravity 12/17/2019 1.015    • Ph 12/17/2019 7.5    • Glucose 12/17/2019 Negative    • Ketones 12/17/2019 Negative    • Protein 12/17/2019 Negative    • Bilirubin 12/17/2019 Negative    • Urobilinogen, Urine 12/17/2019 0.2    • Nitrite 12/17/2019 Negative    • Leukocyte Esterase 12/17/2019 Small*   • Occult Blood 12/17/2019 Negative    • Micro Urine Req 12/17/2019 Microscopic    • WBC 12/17/2019 10-20*   • RBC 12/17/2019 2-5*   • Bacteria 12/17/2019 Few*   • Epithelial Cells 12/17/2019 Moderate*   • Hyaline Cast 12/17/2019 3-5*   • Significant Indicator 12/17/2019 NEG    • Source 12/17/2019 UR    • Site 12/17/2019 -    • Culture Result 12/17/2019 Mixed skin cody 10-50,000 cfu/mL    Office Visit on 12/17/2019   Component Date Value   • POC Color 12/17/2019 yellow    • POC Appearance 12/17/2019 slightly cloudy    • POC Leukocyte Esterase 12/17/2019 large    • POC Nitrites 12/17/2019 neg    • POC Urobiligen 12/17/2019 neg    • POC Protein 12/17/2019 trace    • POC Urine PH 12/17/2019 8.0    • POC Blood 12/17/2019 neg    • POC Specific Gravity 12/17/2019 1.010    • POC Ketones 12/17/2019 neg    • POC Bilirubin 12/17/2019 neg    • POC Glucose 12/17/2019 neg    Hospital Outpatient Visit on 03/12/2019   Component Date Value   • HIV Ag/Ab Combo Assay 03/12/2019 Non Reactive    • Hepatitis B Surface Anti* 03/12/2019 Negative    • Hepatitis B Cors Ab,IgM 03/12/2019 Negative    • Hepatitis A Virus Ab, IgM 03/12/2019 Negative    • Hepatitis C  Antibody 03/12/2019 Negative    • Syphilis, Treponemal Qual 03/12/2019 Non Reactive    • Sodium 03/12/2019 141    • Potassium 03/12/2019 4.2    • Chloride 03/12/2019 107    • Co2 03/12/2019 23    • Anion Gap 03/12/2019 11.0    • Glucose 03/12/2019 82    • Bun 03/12/2019 14    • Creatinine 03/12/2019 0.61    • Calcium 03/12/2019 9.1    • AST(SGOT) 03/12/2019 14    • ALT(SGPT) 03/12/2019 16    • Alkaline Phosphatase 03/12/2019 52    • Total Bilirubin 03/12/2019 0.4    • Albumin 03/12/2019 4.3    • Total Protein 03/12/2019 7.0    • Globulin 03/12/2019 2.7    • A-G Ratio 03/12/2019 1.6    • Glycohemoglobin 03/12/2019 5.2    • Est Avg Glucose 03/12/2019 103    • Cholesterol,Tot 03/12/2019 167    • Triglycerides 03/12/2019 93    • HDL 03/12/2019 32*   • LDL 03/12/2019 116*   • GFR If  03/12/2019 >60    • GFR If Non  Ameri* 03/12/2019 >60    ]

## 2020-03-17 DIAGNOSIS — E28.2 PCOS (POLYCYSTIC OVARIAN SYNDROME): ICD-10-CM

## 2020-03-17 RX ORDER — METFORMIN HYDROCHLORIDE 500 MG/1
TABLET, EXTENDED RELEASE ORAL
Qty: 360 TAB | Refills: 0 | Status: SHIPPED | OUTPATIENT
Start: 2020-03-17 | End: 2020-05-04

## 2020-03-17 NOTE — TELEPHONE ENCOUNTER
Was the patient seen in the last year in this department? Yes    Does patient have an active prescription for medications requested? Yes    Received Request Via: Pharmacy    Hospital Outpatient Visit on 01/29/2020   Component Date Value   • Cholesterol,Tot 01/29/2020 165    • Triglycerides 01/29/2020 135    • HDL 01/29/2020 28*   • LDL 01/29/2020 110*   Hospital Outpatient Visit on 12/17/2019   Component Date Value   • Color 12/17/2019 Yellow    • Character 12/17/2019 Cloudy*   • Specific Gravity 12/17/2019 1.015    • Ph 12/17/2019 7.5    • Glucose 12/17/2019 Negative    • Ketones 12/17/2019 Negative    • Protein 12/17/2019 Negative    • Bilirubin 12/17/2019 Negative    • Urobilinogen, Urine 12/17/2019 0.2    • Nitrite 12/17/2019 Negative    • Leukocyte Esterase 12/17/2019 Small*   • Occult Blood 12/17/2019 Negative    • Micro Urine Req 12/17/2019 Microscopic    • WBC 12/17/2019 10-20*   • RBC 12/17/2019 2-5*   • Bacteria 12/17/2019 Few*   • Epithelial Cells 12/17/2019 Moderate*   • Hyaline Cast 12/17/2019 3-5*   • Significant Indicator 12/17/2019 NEG    • Source 12/17/2019 UR    • Site 12/17/2019 -    • Culture Result 12/17/2019 Mixed skin cody 10-50,000 cfu/mL    Office Visit on 12/17/2019   Component Date Value   • POC Color 12/17/2019 yellow    • POC Appearance 12/17/2019 slightly cloudy    • POC Leukocyte Esterase 12/17/2019 large    • POC Nitrites 12/17/2019 neg    • POC Urobiligen 12/17/2019 neg    • POC Protein 12/17/2019 trace    • POC Urine PH 12/17/2019 8.0    • POC Blood 12/17/2019 neg    • POC Specific Gravity 12/17/2019 1.010    • POC Ketones 12/17/2019 neg    • POC Bilirubin 12/17/2019 neg    • POC Glucose 12/17/2019 neg    ]

## 2020-05-16 ENCOUNTER — OFFICE VISIT (OUTPATIENT)
Dept: URGENT CARE | Facility: PHYSICIAN GROUP | Age: 35
End: 2020-05-16
Payer: COMMERCIAL

## 2020-05-16 VITALS
BODY MASS INDEX: 37.86 KG/M2 | DIASTOLIC BLOOD PRESSURE: 78 MMHG | TEMPERATURE: 98.7 F | OXYGEN SATURATION: 99 % | RESPIRATION RATE: 18 BRPM | SYSTOLIC BLOOD PRESSURE: 136 MMHG | WEIGHT: 207 LBS | HEART RATE: 88 BPM

## 2020-05-16 DIAGNOSIS — S93.402A SPRAIN OF LEFT ANKLE, UNSPECIFIED LIGAMENT, INITIAL ENCOUNTER: ICD-10-CM

## 2020-05-16 PROCEDURE — 99203 OFFICE O/P NEW LOW 30 MIN: CPT | Performed by: INTERNAL MEDICINE

## 2020-05-16 RX ORDER — IBUPROFEN 800 MG/1
800 TABLET ORAL EVERY 8 HOURS PRN
Qty: 20 TAB | Refills: 0 | Status: SHIPPED
Start: 2020-05-16 | End: 2020-07-27

## 2020-05-16 RX ORDER — FLUOXETINE 10 MG/1
CAPSULE ORAL
COMMUNITY
Start: 2020-03-27 | End: 2020-07-13 | Stop reason: SDUPTHER

## 2020-05-16 ASSESSMENT — ENCOUNTER SYMPTOMS
NUMBNESS: 0
TINGLING: 0
LOSS OF SENSATION: 0
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
## 2020-05-26 ENCOUNTER — TELEMEDICINE (OUTPATIENT)
Dept: MEDICAL GROUP | Facility: CLINIC | Age: 35
End: 2020-05-26
Payer: COMMERCIAL

## 2020-05-26 VITALS — WEIGHT: 210 LBS | BODY MASS INDEX: 38.64 KG/M2 | HEIGHT: 62 IN

## 2020-05-26 DIAGNOSIS — E66.9 OBESITY (BMI 35.0-39.9 WITHOUT COMORBIDITY): ICD-10-CM

## 2020-05-26 DIAGNOSIS — S93.402D SPRAIN OF LEFT ANKLE, UNSPECIFIED LIGAMENT, SUBSEQUENT ENCOUNTER: ICD-10-CM

## 2020-05-26 DIAGNOSIS — G60.0 CHARCOT-MARIE-TOOTH DISEASE OF NEURONAL TYPE: ICD-10-CM

## 2020-05-26 PROBLEM — S93.402A SPRAIN OF LEFT ANKLE: Status: ACTIVE | Noted: 2020-05-26

## 2020-05-26 PROCEDURE — 99213 OFFICE O/P EST LOW 20 MIN: CPT | Performed by: PHYSICIAN ASSISTANT

## 2020-05-26 RX ORDER — ORLISTAT 120 MG/1
120 CAPSULE ORAL
Qty: 90 CAP | Refills: 2 | Status: SHIPPED
Start: 2020-05-26 | End: 2020-07-27

## 2020-05-26 NOTE — PROGRESS NOTES
Telemedicine Visit: Established Patient     This encounter was conducted via Zoom .   Verbal consent was obtained. Patient's identity was verified.    Patient agrees to virtual visit encrypted.     Subjective:   CC: angel Gee is a 34 y.o. female presenting for evaluation and management of:    [facility] care.   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  She also has a history of migraines.  She states that she has problems with nerve damage.  She is on metformin for PCOS.     Patient states that she sprained her foot on a scooter.  She states that she landed the wrong way on her foot.  She was seen in an urgent care  In [place].  She was put in a boot but the boot was too big for the foot.  She states that the sprain was 4 weeks.  She has had tears in the tendons in her foot before.  Patient sprained her left foot.  She was also seen in [place] urgent care.      ROS   Denies any recent fevers or chills. No nausea or vomiting. No chest pains or shortness of breath.     No Known Allergies    Current medicines (including changes today)  Current Outpatient Medications   Medication Sig Dispense Refill   • orlistat (XENICAL) 120 MG capsule Take 1 Cap by mouth 3 times a day, with meals. 90 Cap 2   • FLUoxetine (PROZAC) 10 MG Cap      • ibuprofen (MOTRIN) 800 MG Tab Take 1 Tab by mouth every 8 hours as needed. 20 Tab 0   • metFORMIN ER (GLUCOPHAGE XR) 500 MG TABLET SR 24 HR TAKE TWO TABLETS BY MOUTH TWICE A  Tab 0   • omeprazole (PRILOSEC) 20 MG delayed-release capsule TAKE ONE CAPSULE BY MOUTH DAILY 90 Cap 0   • traZODone (DESYREL) 100 MG Tab TAKE ONE TABLET BY MOUTH EVERY NIGHT AT BEDTIME -GENERIC FOR DESYREL 30 Tab 0   • eletriptan (RELPAX) 40 MG tablet 1 tab at headache onset; repeat in 1 hour prn 9 Tab 4   • tizanidine (ZANAFLEX) 4 MG Tab Take 0.5-1 Tabs by mouth every 6 hours as needed. 30 Tab 3   • albuterol 108 (90 Base) MCG/ACT Aero Soln inhalation aerosol  "Inhale 2 Puffs by mouth every 6 hours as needed for Shortness of Breath. 8.5 g 4   • Fremanezumab-vfrm (AJOVY) 225 MG/1.5ML Solution Prefilled Syringe Inject 225 mg as instructed Q 4 Weeks. 1 Syringe 5     No current facility-administered medications for this visit.        Patient Active Problem List    Diagnosis Date Noted   • Sprain of left ankle 05/26/2020   • 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   • Insomnia 09/23/2019   • MELISSA (obstructive sleep apnea) 03/11/2019   • Non-smoker 03/11/2019   • Gastroesophageal reflux disease 03/11/2019   • Nonspecific abnormal electromyogram (EMG) 03/06/2018   • Obesity (BMI 35.0-39.9 without comorbidity) ([facility]) 03/06/2018   • Mild intermittent asthma without complication 01/22/2018   • Rash of back 10/12/2017   • Acute recurrent sinusitis 09/21/2017   • Bipolar affective disorder ([facility]) 02/16/2016   • PCOS (polycystic ovarian syndrome) 02/16/2016   • Acne vulgaris 02/16/2016   • Intractable migraine without aura and without status migrainosus 01/01/2014       Family History   Problem Relation Age of Onset   • Hyperlipidemia Mother    • Hypertension Mother    • Alcohol/Drug Father    • Heart Failure Father    • Heart Disease Maternal Grandfather    • Cancer Maternal Grandfather         Colon   • Stroke Neg Hx    • Diabetes Neg Hx        She  has a past medical history of Bladder infection (2012), Borderline personality disorder (HCC), Chickenpox, Depression, Gastritis, Influenza, Obesity, Pneumonia (2007), and Restless leg syndrome.  She  has a past surgical history that includes tonsillectomy (Bilateral, 08/05/2019) and uvulopharyngopalatoplasty.       Objective:   Ht 1.575 m (5' 2\")   Wt 95.3 kg (210 lb)   BMI 38.41 kg/m²     Physical Exam:  Constitutional: Alert, no distress, well-groomed.  Skin: No rashes in visible areas.  Eye: Round. Conjunctiva clear, lids normal. No icterus.   ENMT: Lips pink without lesions, good dentition, moist mucous membranes. " Phonation normal.  Neck: No masses, no thyromegaly. Moves freely without pain.  Respiratory: Unlabored respiratory effort, no cough or audible wheeze  Psych: Alert and oriented x3, normal affect and mood.       Assessment and Plan:   The following treatment plan was discussed:     1. Charcot-Michelle-Tooth disease of neuronal type  2. Sprain of left ankle, unspecified ligament, subsequent encounter  - MR-ANKLE W/O LEFT; Future    As patient has a history of Charcot-Michelle-Tooth disease, and ankle is slow in healing, I recommend we obtain an MRI to evaluate further.  We will follow-up in 2 to 4 weeks, sooner if needed.    3. Obesity (BMI 35.0-39.9 without comorbidity) (HCC)  - orlistat (XENICAL) 120 MG capsule; Take 1 Cap by mouth 3 times a day, with meals.  Dispense: 90 Cap; Refill: 2    Patient is wanting a weight loss medication.  Discussed orlistat including side effects.  Patient wishes to proceed with medication at this time.    Follow-up: Return in about 4 weeks (around 6/23/2020), or if symptoms worsen or fail to improve.

## 2020-05-28 ENCOUNTER — TELEPHONE (OUTPATIENT)
Dept: MEDICAL GROUP | Facility: CLINIC | Age: 35
End: 2020-05-28

## 2020-06-30 ENCOUNTER — OFFICE VISIT (OUTPATIENT)
Dept: MEDICAL GROUP | Facility: CLINIC | Age: 35
End: 2020-06-30
Payer: COMMERCIAL

## 2020-06-30 VITALS
BODY MASS INDEX: 38.53 KG/M2 | TEMPERATURE: 97.6 F | HEART RATE: 76 BPM | OXYGEN SATURATION: 99 % | HEIGHT: 62 IN | WEIGHT: 209.4 LBS | DIASTOLIC BLOOD PRESSURE: 80 MMHG | SYSTOLIC BLOOD PRESSURE: 114 MMHG

## 2020-06-30 DIAGNOSIS — E66.9 OBESITY (BMI 35.0-39.9 WITHOUT COMORBIDITY): ICD-10-CM

## 2020-06-30 DIAGNOSIS — G60.0 CHARCOT-MARIE-TOOTH DISEASE OF NEURONAL TYPE: ICD-10-CM

## 2020-06-30 PROCEDURE — 99213 OFFICE O/P EST LOW 20 MIN: CPT | Performed by: PHYSICIAN ASSISTANT

## 2020-06-30 NOTE — PROGRESS NOTES
cc:  fmla paperwork    Subjective:     Jennifer Gee is a 34 y.o. female presenting for fmla paperwork      Patient presents to the office for fmla paperwork.  kimo has a history of charcot-candy-tooth disease.  She has been working from home.  She states that her productivity has increased with less mistakes as she is not driving 2 hours from the pain in her legs.  As she has avoided commuting, her pain has been better in the legs.  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 She has had FMLA paperwork in the past.  Patient does see neurology.     Review of systems:  See above.   Denies any symptoms unless previously indicated.        Current Outpatient Medications:   •  omeprazole (PRILOSEC) 20 MG delayed-release capsule, GENERIC FOR PRILOSEC-TAKE ONE CAPSULE BY MOUTH DAILY, Disp: 90 Cap, Rfl: 1  •  orlistat (XENICAL) 120 MG capsule, Take 1 Cap by mouth 3 times a day, with meals., Disp: 90 Cap, Rfl: 2  •  FLUoxetine (PROZAC) 10 MG Cap, , Disp: , Rfl:   •  ibuprofen (MOTRIN) 800 MG Tab, Take 1 Tab by mouth every 8 hours as needed., Disp: 20 Tab, Rfl: 0  •  metFORMIN ER (GLUCOPHAGE XR) 500 MG TABLET SR 24 HR, TAKE TWO TABLETS BY MOUTH TWICE A DAY, Disp: 360 Tab, Rfl: 0  •  traZODone (DESYREL) 100 MG Tab, TAKE ONE TABLET BY MOUTH EVERY NIGHT AT BEDTIME -GENERIC FOR DESYREL, Disp: 30 Tab, Rfl: 0  •  eletriptan (RELPAX) 40 MG tablet, 1 tab at headache onset; repeat in 1 hour prn, Disp: 9 Tab, Rfl: 4  •  Fremanezumab-vfrm (AJOVY) 225 MG/1.5ML Solution Prefilled Syringe, Inject 225 mg as instructed Q 4 Weeks., Disp: 1 Syringe, Rfl: 5  •  tizanidine (ZANAFLEX) 4 MG Tab, Take 0.5-1 Tabs by mouth every 6 hours as needed., Disp: 30 Tab, Rfl: 3  •  albuterol 108 (90 Base) MCG/ACT Aero Soln inhalation aerosol, Inhale 2 Puffs by mouth every 6 hours as needed for Shortness of Breath., Disp: 8.5 g, Rfl: 4    Allergies, past medical history, past surgical history, family history, social history  "reviewed and updated    Objective:     Vitals: /80 (BP Location: Left arm, Patient Position: Sitting)   Pulse 76   Temp 36.4 °C (97.6 °F) (Temporal)   Ht 1.575 m (5' 2\")   Wt 95 kg (209 lb 6.4 oz)   SpO2 99%   BMI 38.30 kg/m²   General: Alert, pleasant, NAD  EYES:   PERRL, EOMI, no icterus or pallor.  Conjunctivae and lids normal.   HENT:  Normocephalic.  External ears normal.  Neck supple.     Abdomen: obese  Skin: Warm, dry, no rashes.  Musculoskeletal: Gait is normal.  Moves all extremities well.    Extremities: hyperflexed knee left side.   Neurological: No tremors, sensation grossly intact,  CN2-12 intact.  Psych:  Affect/mood is normal, judgement is good, memory is intact, grooming is appropriate.    Assessment/Plan:     Jennifer was seen today for paperwork.    Diagnoses and all orders for this visit:    Charcot-Michelle-Tooth disease of neuronal type  -     Lipid Profile; Future  -     Comp Metabolic Panel; Future  -     TSH; Future  -     FREE THYROXINE; Future  -     VITAMIN D,25 HYDROXY; Future    Obesity (BMI 35.0-39.9 without comorbidity) ([facility])  -     Lipid Profile; Future  -     Comp Metabolic Panel; Future  -     TSH; Future  -     FREE THYROXINE; Future  -     VITAMIN D,25 HYDROXY; Future      FMLA paperwork filled out at this time.  Labs been ordered to evaluate.Plan is to follow-up in about 4 to 8 weeks with lab results.  She will contact us sooner if needed.      No follow-ups on file.    Please note that this dictation was created using voice recognition software. I have made every reasonable attempt to correct obvious errors, but expect that there are errors of grammar and possible content that I did not discover before finalizing note.   "

## 2020-07-11 ENCOUNTER — PATIENT MESSAGE (OUTPATIENT)
Dept: MEDICAL GROUP | Facility: CLINIC | Age: 35
End: 2020-07-11

## 2020-07-11 DIAGNOSIS — F31.62 BIPOLAR DISORDER, CURRENT EPISODE MIXED, MODERATE (HCC): ICD-10-CM

## 2020-07-13 RX ORDER — FLUOXETINE 10 MG/1
10 CAPSULE ORAL DAILY
Qty: 30 CAP | Refills: 0 | Status: SHIPPED | OUTPATIENT
Start: 2020-07-13 | End: 2020-08-10

## 2020-07-18 ENCOUNTER — HOSPITAL ENCOUNTER (OUTPATIENT)
Dept: LAB | Facility: MEDICAL CENTER | Age: 35
End: 2020-07-18
Attending: PHYSICIAN ASSISTANT
Payer: COMMERCIAL

## 2020-07-18 DIAGNOSIS — G60.0 CHARCOT-MARIE-TOOTH DISEASE OF NEURONAL TYPE: ICD-10-CM

## 2020-07-18 DIAGNOSIS — E66.9 OBESITY (BMI 35.0-39.9 WITHOUT COMORBIDITY): ICD-10-CM

## 2020-07-18 LAB
25(OH)D3 SERPL-MCNC: 27 NG/ML (ref 30–100)
ALBUMIN SERPL BCP-MCNC: 4.3 G/DL (ref 3.2–4.9)
ALBUMIN/GLOB SERPL: 1.9 G/DL
ALP SERPL-CCNC: 68 U/L (ref 30–99)
ALT SERPL-CCNC: 26 U/L (ref 2–50)
ANION GAP SERPL CALC-SCNC: 12 MMOL/L (ref 7–16)
AST SERPL-CCNC: 13 U/L (ref 12–45)
BILIRUB SERPL-MCNC: 0.3 MG/DL (ref 0.1–1.5)
BUN SERPL-MCNC: 8 MG/DL (ref 8–22)
CALCIUM SERPL-MCNC: 9.2 MG/DL (ref 8.5–10.5)
CHLORIDE SERPL-SCNC: 101 MMOL/L (ref 96–112)
CHOLEST SERPL-MCNC: 180 MG/DL (ref 100–199)
CO2 SERPL-SCNC: 24 MMOL/L (ref 20–33)
CREAT SERPL-MCNC: 0.6 MG/DL (ref 0.5–1.4)
GLOBULIN SER CALC-MCNC: 2.3 G/DL (ref 1.9–3.5)
GLUCOSE SERPL-MCNC: 90 MG/DL (ref 65–99)
HDLC SERPL-MCNC: 24 MG/DL
LDLC SERPL CALC-MCNC: 110 MG/DL
POTASSIUM SERPL-SCNC: 4.3 MMOL/L (ref 3.6–5.5)
PROT SERPL-MCNC: 6.6 G/DL (ref 6–8.2)
SODIUM SERPL-SCNC: 137 MMOL/L (ref 135–145)
T4 FREE SERPL-MCNC: 1.06 NG/DL (ref 0.93–1.7)
TRIGL SERPL-MCNC: 230 MG/DL (ref 0–149)
TSH SERPL DL<=0.005 MIU/L-ACNC: 2.19 UIU/ML (ref 0.38–5.33)

## 2020-07-18 PROCEDURE — 80053 COMPREHEN METABOLIC PANEL: CPT

## 2020-07-18 PROCEDURE — 82306 VITAMIN D 25 HYDROXY: CPT

## 2020-07-18 PROCEDURE — 84443 ASSAY THYROID STIM HORMONE: CPT

## 2020-07-18 PROCEDURE — 80061 LIPID PANEL: CPT

## 2020-07-18 PROCEDURE — 36415 COLL VENOUS BLD VENIPUNCTURE: CPT

## 2020-07-18 PROCEDURE — 84439 ASSAY OF FREE THYROXINE: CPT

## 2020-07-27 ENCOUNTER — TELEMEDICINE (OUTPATIENT)
Dept: MEDICAL GROUP | Facility: CLINIC | Age: 35
End: 2020-07-27
Payer: COMMERCIAL

## 2020-07-27 DIAGNOSIS — G60.0 CHARCOT-MARIE-TOOTH DISEASE OF NEURONAL TYPE: ICD-10-CM

## 2020-07-27 DIAGNOSIS — E55.9 VITAMIN D DEFICIENCY: ICD-10-CM

## 2020-07-27 DIAGNOSIS — E78.49 OTHER HYPERLIPIDEMIA: ICD-10-CM

## 2020-07-27 PROCEDURE — 99214 OFFICE O/P EST MOD 30 MIN: CPT | Mod: 95,CR | Performed by: PHYSICIAN ASSISTANT

## 2020-07-27 RX ORDER — CYCLOBENZAPRINE HCL 10 MG
10 TABLET ORAL 3 TIMES DAILY PRN
Qty: 30 TAB | Refills: 0 | Status: SHIPPED | OUTPATIENT
Start: 2020-07-27 | End: 2020-09-24

## 2020-07-27 NOTE — PROGRESS NOTES
Telemedicine Visit: Established Patient     This encounter was conducted via Zoom .   Verbal consent was obtained. Patient's identity was verified.    Patient agrees to encrypted video conference    Subjective:   CC: lab review  Jennifer Gee is a 34 y.o. female presenting for evaluation and management of:    Lab review.  Patient is wanting to discuss labs    Patient is requesting to know if muscle relaxers will help her nerve pain.  She states that she has nerve pain as a result of a pinched nerve from C7.  She states that it will be swollen in her shoulder and back.  She states that she has difficulty sleeping. She states that tizanidine.  She is wanting to try cyclobenzaprine.  She has tried voltaren which does help some.  She has been to pain management in the past.  She has been on gabapentin.  She states that she maxed out the medication.  She has tried PT.  Patient has been frustrated with neurology as they have not been forthcoming with information.     ROS   Denies any recent fevers or chills. No nausea or vomiting. No chest pains or shortness of breath.     No Known Allergies    Current medicines (including changes today)  Current Outpatient Medications   Medication Sig Dispense Refill   • cyclobenzaprine (FLEXERIL) 10 MG Tab Take 1 Tab by mouth 3 times a day as needed. 30 Tab 0   • FLUoxetine (PROZAC) 10 MG Cap Take 1 Cap by mouth every day. 30 Cap 0   • omeprazole (PRILOSEC) 20 MG delayed-release capsule GENERIC FOR PRILOSEC-TAKE ONE CAPSULE BY MOUTH DAILY 90 Cap 1   • metFORMIN ER (GLUCOPHAGE XR) 500 MG TABLET SR 24 HR TAKE TWO TABLETS BY MOUTH TWICE A  Tab 0   • traZODone (DESYREL) 100 MG Tab TAKE ONE TABLET BY MOUTH EVERY NIGHT AT BEDTIME -GENERIC FOR DESYREL 30 Tab 0   • eletriptan (RELPAX) 40 MG tablet 1 tab at headache onset; repeat in 1 hour prn 9 Tab 4   • albuterol 108 (90 Base) MCG/ACT Aero Soln inhalation aerosol Inhale 2 Puffs by mouth every 6 hours as needed for Shortness of  Breath. 8.5 g 4     No current facility-administered medications for this visit.        Patient Active Problem List    Diagnosis Date Noted   • Other hyperlipidemia 07/27/2020   • Vitamin D deficiency 07/27/2020   • Sprain of left ankle 05/26/2020   • 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   • Insomnia 09/23/2019   • MELISSA (obstructive sleep apnea) 03/11/2019   • Non-smoker 03/11/2019   • Gastroesophageal reflux disease 03/11/2019   • Nonspecific abnormal electromyogram (EMG) 03/06/2018   • Obesity (BMI 35.0-39.9 without comorbidity) ([facility]) 03/06/2018   • Mild intermittent asthma without complication 01/22/2018   • Rash of back 10/12/2017   • Acute recurrent sinusitis 09/21/2017   • Bipolar affective disorder ([facility]) 02/16/2016   • PCOS (polycystic ovarian syndrome) 02/16/2016   • Acne vulgaris 02/16/2016   • Intractable migraine without aura and without status migrainosus 01/01/2014       Family History   Problem Relation Age of Onset   • Hyperlipidemia Mother    • Hypertension Mother    • Alcohol/Drug Father    • Heart Failure Father    • Heart Disease Maternal Grandfather    • Cancer Maternal Grandfather         Colon   • Stroke Neg Hx    • Diabetes Neg Hx        She  has a past medical history of Bladder infection (2012), Borderline personality disorder (HCC), Chickenpox, Depression, Gastritis, Influenza, Obesity, Pneumonia (2007), and Restless leg syndrome.  She  has a past surgical history that includes tonsillectomy (Bilateral, 08/05/2019) and uvulopharyngopalatoplasty.       Objective:   There were no vitals taken for this visit.    Physical Exam:  Constitutional: Alert, no distress, well-groomed.  Skin: No rashes in visible areas.  Eye: Round. Conjunctiva clear, lids normal. No icterus.   ENMT: Lips pink without lesions, good dentition, moist mucous membranes. Phonation normal.  Neck: No masses, no thyromegaly. Moves freely without pain.  Respiratory: Unlabored respiratory effort, no cough or  audible wheeze  Psych: Alert and oriented x3, normal affect and mood.       Assessment and Plan:   The following treatment plan was discussed:     1. Charcot-Michelle-Tooth disease of neuronal type  - cyclobenzaprine (FLEXERIL) 10 MG Tab; Take 1 Tab by mouth 3 times a day as needed.  Dispense: 30 Tab; Refill: 0    We will try patient on Flexeril as she would really like to give this medication a try.  If there is no significant improvement, the plan is to follow-up in approximately 4 weeks, sooner if needed and submit a referral to neurology for second opinion.    2. Other hyperlipidemia  - Lipid Profile; Future  - Comp Metabolic Panel; Future  3. Vitamin D deficiency  - VITAMIN D,25 HYDROXY; Future    Diet has changed for patient.  She will work on improved diet.  We will repeat labs in 6 months.    This was a 30-minute appointment with greater than 50% spent in education and counseling.    Follow-up: Return in about 4 weeks (around 8/24/2020), or if symptoms worsen or fail to improve.

## 2020-10-26 DIAGNOSIS — E28.2 PCOS (POLYCYSTIC OVARIAN SYNDROME): ICD-10-CM

## 2020-10-26 NOTE — TELEPHONE ENCOUNTER
Was the patient seen in the last year in this department? Yes    Does patient have an active prescription for medications requested? Yes    Received Request Via: Pharmacy    Hospital Outpatient Visit on 07/18/2020   Component Date Value   • 25-Hydroxy   Vitamin D 25 07/18/2020 27*   • Free T-4 07/18/2020 1.06    • TSH 07/18/2020 2.190    • Sodium 07/18/2020 137    • Potassium 07/18/2020 4.3    • Chloride 07/18/2020 101    • Co2 07/18/2020 24    • Anion Gap 07/18/2020 12.0    • Glucose 07/18/2020 90    • Bun 07/18/2020 8    • Creatinine 07/18/2020 0.60    • Calcium 07/18/2020 9.2    • AST(SGOT) 07/18/2020 13    • ALT(SGPT) 07/18/2020 26    • Alkaline Phosphatase 07/18/2020 68    • Total Bilirubin 07/18/2020 0.3    • Albumin 07/18/2020 4.3    • Total Protein 07/18/2020 6.6    • Globulin 07/18/2020 2.3    • A-G Ratio 07/18/2020 1.9    • Cholesterol,Tot 07/18/2020 180    • Triglycerides 07/18/2020 230*   • HDL 07/18/2020 24*   • LDL 07/18/2020 110*   • GFR If  07/18/2020 >60    • GFR If Non  [facility]* 07/18/2020 >60    Hospital Outpatient Visit on 01/29/2020   Component Date Value   • Cholesterol,Tot 01/29/2020 165    • Triglycerides 01/29/2020 135    • HDL 01/29/2020 28*   • LDL 01/29/2020 110*   Hospital Outpatient Visit on 12/17/2019   Component Date Value   • Color 12/17/2019 Yellow    • Character 12/17/2019 Cloudy*   • Specific Gravity 12/17/2019 1.015    • Ph 12/17/2019 7.5    • Glucose 12/17/2019 Negative    • Ketones 12/17/2019 Negative    • Protein 12/17/2019 Negative    • Bilirubin 12/17/2019 Negative    • Urobilinogen, Urine 12/17/2019 0.2    • Nitrite 12/17/2019 Negative    • Leukocyte Esterase 12/17/2019 Small*   • Occult Blood 12/17/2019 Negative    • Micro Urine Req 12/17/2019 Microscopic    • WBC 12/17/2019 10-20*   • RBC 12/17/2019 2-5*   • Bacteria 12/17/2019 Few*   • Epithelial Cells 12/17/2019 Moderate*   • Hyaline Cast 12/17/2019 3-5*   • Significant Indicator 12/17/2019 NEG    •  Source 12/17/2019 UR    • Site 12/17/2019 -    • Culture Result 12/17/2019 Mixed skin cody 10-50,000 cfu/mL    Office Visit on 12/17/2019   Component Date Value   • POC Color 12/17/2019 yellow    • POC Appearance 12/17/2019 slightly cloudy    • POC Leukocyte Esterase 12/17/2019 large    • POC Nitrites 12/17/2019 neg    • POC Urobiligen 12/17/2019 neg    • POC Protein 12/17/2019 trace    • POC Urine PH 12/17/2019 8.0    • POC Blood 12/17/2019 neg    • POC Specific Gravity 12/17/2019 1.010    • POC Ketones 12/17/2019 neg    • POC Bilirubin 12/17/2019 neg    • POC Glucose 12/17/2019 neg    ]

## 2020-10-27 RX ORDER — METFORMIN HYDROCHLORIDE 500 MG/1
1000 TABLET, EXTENDED RELEASE ORAL 2 TIMES DAILY
Qty: 360 TAB | Refills: 0 | Status: SHIPPED | OUTPATIENT
Start: 2020-10-27 | End: 2021-01-21

## 2020-12-02 DIAGNOSIS — S93.402A SPRAIN OF LEFT ANKLE, UNSPECIFIED LIGAMENT, INITIAL ENCOUNTER: ICD-10-CM

## 2020-12-02 RX ORDER — IBUPROFEN 800 MG/1
800 TABLET ORAL EVERY 8 HOURS PRN
Qty: 20 TAB | Refills: 0 | Status: SHIPPED | OUTPATIENT
Start: 2020-12-02 | End: 2021-01-04

## 2021-01-25 RX ORDER — OMEPRAZOLE 20 MG/1
CAPSULE, DELAYED RELEASE ORAL
Qty: 90 CAP | Refills: 0 | Status: SHIPPED | OUTPATIENT
Start: 2021-01-25 | End: 2021-04-14

## 2021-01-25 NOTE — TELEPHONE ENCOUNTER
Was the patient seen in the last year in this department? Yes    Does patient have an active prescription for medications requested? Yes    Received Request Via: Pharmacy    Hospital Outpatient Visit on 07/18/2020   Component Date Value   • 25-Hydroxy   Vitamin D 25 07/18/2020 27*   • Free T-4 07/18/2020 1.06    • TSH 07/18/2020 2.190    • Sodium 07/18/2020 137    • Potassium 07/18/2020 4.3    • Chloride 07/18/2020 101    • Co2 07/18/2020 24    • Anion Gap 07/18/2020 12.0    • Glucose 07/18/2020 90    • Bun 07/18/2020 8    • Creatinine 07/18/2020 0.60    • Calcium 07/18/2020 9.2    • AST(SGOT) 07/18/2020 13    • ALT(SGPT) 07/18/2020 26    • Alkaline Phosphatase 07/18/2020 68    • Total Bilirubin 07/18/2020 0.3    • Albumin 07/18/2020 4.3    • Total Protein 07/18/2020 6.6    • Globulin 07/18/2020 2.3    • A-G Ratio 07/18/2020 1.9    • Cholesterol,Tot 07/18/2020 180    • Triglycerides 07/18/2020 230*   • HDL 07/18/2020 24*   • LDL 07/18/2020 110*   • GFR If  07/18/2020 >60    • GFR If Non  [facility]* 07/18/2020 >60    Hospital Outpatient Visit on 01/29/2020   Component Date Value   • Cholesterol,Tot 01/29/2020 165    • Triglycerides 01/29/2020 135    • HDL 01/29/2020 28*   • LDL 01/29/2020 110*   ]

## 2021-03-08 DIAGNOSIS — S93.402A SPRAIN OF LEFT ANKLE, UNSPECIFIED LIGAMENT, INITIAL ENCOUNTER: ICD-10-CM

## 2021-03-08 RX ORDER — IBUPROFEN 800 MG/1
TABLET ORAL
Qty: 20 TABLET | Refills: 0 | Status: SHIPPED | OUTPATIENT
Start: 2021-03-08 | End: 2021-05-05

## 2021-03-08 NOTE — TELEPHONE ENCOUNTER
Was the patient seen in the last year in this department? Yes    Does patient have an active prescription for medications requested? Yes    Received Request Via: Pharmacy    Hospital Outpatient Visit on 07/18/2020   Component Date Value   • 25-Hydroxy   Vitamin D 25 07/18/2020 27*   • Free T-4 07/18/2020 1.06    • TSH 07/18/2020 2.190    • Sodium 07/18/2020 137    • Potassium 07/18/2020 4.3    • Chloride 07/18/2020 101    • Co2 07/18/2020 24    • Anion Gap 07/18/2020 12.0    • Glucose 07/18/2020 90    • Bun 07/18/2020 8    • Creatinine 07/18/2020 0.60    • Calcium 07/18/2020 9.2    • AST(SGOT) 07/18/2020 13    • ALT(SGPT) 07/18/2020 26    • Alkaline Phosphatase 07/18/2020 68    • Total Bilirubin 07/18/2020 0.3    • Albumin 07/18/2020 4.3    • Total Protein 07/18/2020 6.6    • Globulin 07/18/2020 2.3    • A-G Ratio 07/18/2020 1.9    • Cholesterol,Tot 07/18/2020 180    • Triglycerides 07/18/2020 230*   • HDL 07/18/2020 24*   • LDL 07/18/2020 110*   • GFR If  07/18/2020 >60    • GFR If Non  [facility]* 07/18/2020 >60    ]

## 2021-03-16 ENCOUNTER — TELEMEDICINE (OUTPATIENT)
Dept: MEDICAL GROUP | Facility: CLINIC | Age: 36
End: 2021-03-16
Payer: COMMERCIAL

## 2021-03-16 DIAGNOSIS — G60.0 CHARCOT-MARIE-TOOTH DISEASE OF NEURONAL TYPE: ICD-10-CM

## 2021-03-16 PROCEDURE — 99213 OFFICE O/P EST LOW 20 MIN: CPT | Mod: 95,CR | Performed by: PHYSICIAN ASSISTANT

## 2021-03-16 RX ORDER — LAMOTRIGINE 25 MG/1
50 TABLET ORAL 2 TIMES DAILY
COMMUNITY
End: 2021-08-19

## 2021-03-16 NOTE — PROGRESS NOTES
Virtual Visit: Established Patient   This visit was conducted via Zoom using secure and encrypted videoconferencing technology. The patient was in a private location in the state of [place].    The patient's identity was confirmed and verbal consent was obtained for this virtual visit.    Subjective:   CC:   Chief Complaint   Patient presents with   • Referral Needed       Jennifer Anai Gee is a 35 y.o. female presenting for evaluation and management of:    Medications.  Patient states that her pharmacy had continued to send ibuprofen.  Patient states that she is not needing all the refills that her pharmacy has been submitting.      Patient states that she is back on lamotrigine for her bipolar.  She states that she will be adjusting her dose up for the next several weeks.     Patient states that she needs a referral for massage therapy.  She states that this does help her neuropathy.  She is requesting a prescription for a massage therapist.        ROS   Denies any recent fevers or chills. No nausea or vomiting. No chest pains or shortness of breath.     No Known Allergies    Current medicines (including changes today)  Current Outpatient Medications   Medication Sig Dispense Refill   • lamoTRIgine (LAMICTAL) 25 MG Tab Take 50 mg by mouth 2 times a day. Indications: Manic-Depression     • NON SPECIFIED Massage therapy for neuropathy. 1 Each 0   • ibuprofen (MOTRIN) 800 MG Tab TAKE ONE TABLET BY MOUTH EVERY 8 HOURS AS NEEDED 20 tablet 0   • omeprazole (PRILOSEC) 20 MG delayed-release capsule TAKE ONE CAPSULE BY MOUTH DAILY *PRILOSEC* 90 Cap 0   • cyclobenzaprine (FLEXERIL) 10 mg Tab TAKE ONE TABLET BY MOUTH THREE TIMES A DAY AS NEEDED - FLEXERIL 30 Tab 3   • FLUoxetine (PROZAC) 10 MG Cap TAKE ONE CAPSULE BY MOUTH DAILY 30 Cap 5   • traZODone (DESYREL) 100 MG Tab TAKE ONE TABLET BY MOUTH EVERY NIGHT AT BEDTIME -GENERIC FOR DESYREL 30 Tab 0   • albuterol 108 (90 Base) MCG/ACT Aero Soln inhalation aerosol Inhale 2  Puffs by mouth every 6 hours as needed for Shortness of Breath. 8.5 g 4   • metFORMIN ER (GLUCOPHAGE XR) 500 MG TABLET SR 24 HR TAKE TWO TABLETS BY MOUTH TWICE A  Tab 0     No current facility-administered medications for this visit.       Patient Active Problem List    Diagnosis Date Noted   • Other hyperlipidemia 07/27/2020   • Vitamin D deficiency 07/27/2020   • Sprain of left ankle 05/26/2020   • 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   • Insomnia 09/23/2019   • MELISSA (obstructive sleep apnea) 03/11/2019   • Non-smoker 03/11/2019   • Gastroesophageal reflux disease 03/11/2019   • Nonspecific abnormal electromyogram (EMG) 03/06/2018   • Obesity (BMI 35.0-39.9 without comorbidity) ([facility]) 03/06/2018   • Mild intermittent asthma without complication 01/22/2018   • Rash of back 10/12/2017   • Acute recurrent sinusitis 09/21/2017   • Bipolar affective disorder ([facility]) 02/16/2016   • PCOS (polycystic ovarian syndrome) 02/16/2016   • Acne vulgaris 02/16/2016   • Intractable migraine without aura and without status migrainosus 01/01/2014       Family History   Problem Relation Age of Onset   • Hyperlipidemia Mother    • Hypertension Mother    • Alcohol/Drug Father    • Heart Failure Father    • Heart Disease Maternal Grandfather    • Cancer Maternal Grandfather         Colon   • Stroke Neg Hx    • Diabetes Neg Hx        She  has a past medical history of Bladder infection (2012), Borderline personality disorder (HCC), Chickenpox, Depression, Gastritis, History of dental surgery, Influenza, Obesity, Pneumonia (2007), and Restless leg syndrome.  She  has a past surgical history that includes tonsillectomy (Bilateral, 08/05/2019) and uvulopharyngopalatoplasty.       Objective:   There were no vitals taken for this visit.    Physical Exam:  Constitutional: Alert, no distress, well-groomed.  Skin: No rashes in visible areas.  Eye: Round. Conjunctiva clear, lids normal. No icterus.   ENMT: Lips pink without  lesions, good dentition, moist mucous membranes. Phonation normal.  Neck: No masses, no thyromegaly. Moves freely without pain.  Respiratory: Unlabored respiratory effort, no cough or audible wheeze  Psych: Alert and oriented x3, normal affect and mood.       Assessment and Plan:   The following treatment plan was discussed:     1. Charcot-Michelle-Tooth disease of neuronal type  - REFERRAL TO MASSAGE THERAPY  - NON SPECIFIED; Massage therapy for neuropathy.  Dispense: 1 Each; Refill: 0    Referral and Rx submitted for massage therapy    Other orders  - lamoTRIgine (LAMICTAL) 25 MG Tab; Take 50 mg by mouth 2 times a day. Indications: Manic-Depression    Patient will follow up with psychiatry.    Follow-up: No follow-ups on file.

## 2021-04-14 ENCOUNTER — PATIENT MESSAGE (OUTPATIENT)
Dept: MEDICAL GROUP | Facility: CLINIC | Age: 36
End: 2021-04-14

## 2021-04-14 DIAGNOSIS — G60.0 CHARCOT-MARIE-TOOTH DISEASE OF NEURONAL TYPE: ICD-10-CM

## 2021-05-20 ENCOUNTER — OFFICE VISIT (OUTPATIENT)
Dept: MEDICAL GROUP | Facility: CLINIC | Age: 36
End: 2021-05-20
Payer: COMMERCIAL

## 2021-05-20 VITALS
WEIGHT: 234 LBS | RESPIRATION RATE: 16 BRPM | HEIGHT: 62 IN | OXYGEN SATURATION: 96 % | HEART RATE: 123 BPM | SYSTOLIC BLOOD PRESSURE: 124 MMHG | BODY MASS INDEX: 43.06 KG/M2 | DIASTOLIC BLOOD PRESSURE: 88 MMHG | TEMPERATURE: 98.5 F

## 2021-05-20 DIAGNOSIS — L89.42 PRESSURE INJURY OF CONTIGUOUS REGION INVOLVING BACK AND BUTTOCK, STAGE 2, UNSPECIFIED LATERALITY (HCC): ICD-10-CM

## 2021-05-20 DIAGNOSIS — L70.0 ACNE VULGARIS: ICD-10-CM

## 2021-05-20 DIAGNOSIS — E28.2 PCOS (POLYCYSTIC OVARIAN SYNDROME): ICD-10-CM

## 2021-05-20 PROCEDURE — 99213 OFFICE O/P EST LOW 20 MIN: CPT | Performed by: PHYSICIAN ASSISTANT

## 2021-05-20 RX ORDER — SULFAMETHOXAZOLE AND TRIMETHOPRIM 800; 160 MG/1; MG/1
1 TABLET ORAL 2 TIMES DAILY
Qty: 20 TABLET | Refills: 0 | Status: SHIPPED | OUTPATIENT
Start: 2021-05-20 | End: 2021-08-19 | Stop reason: SDUPTHER

## 2021-05-20 RX ORDER — BETAMETHASONE DIPROPIONATE 0.05 %
OINTMENT (GRAM) TOPICAL
Qty: 50 G | Refills: 0 | Status: SHIPPED | OUTPATIENT
Start: 2021-05-20 | End: 2022-11-03

## 2021-05-20 RX ORDER — FLUTICASONE PROPIONATE 50 MCG
SPRAY, SUSPENSION (ML) NASAL
COMMUNITY
Start: 2021-05-16 | End: 2021-08-19 | Stop reason: SDUPTHER

## 2021-05-20 RX ORDER — LAMOTRIGINE 100 MG/1
TABLET ORAL
COMMUNITY
Start: 2021-04-29 | End: 2022-06-01

## 2021-05-20 RX ORDER — ERYTHROMYCIN AND BENZOYL PEROXIDE 30; 50 MG/G; MG/G
GEL TOPICAL
Qty: 46.6 G | Refills: 6 | Status: SHIPPED | OUTPATIENT
Start: 2021-05-20 | End: 2022-11-03

## 2021-05-20 RX ORDER — CLONAZEPAM 0.5 MG/1
1 TABLET ORAL DAILY
COMMUNITY
Start: 2021-04-28 | End: 2022-11-03

## 2021-05-20 RX ORDER — METFORMIN HYDROCHLORIDE 500 MG/1
1000 TABLET, EXTENDED RELEASE ORAL 2 TIMES DAILY
Qty: 360 TABLET | Refills: 1
Start: 2021-05-20 | End: 2021-08-19 | Stop reason: SDUPTHER

## 2021-05-20 RX ORDER — PROPRANOLOL HYDROCHLORIDE 10 MG/1
TABLET ORAL
COMMUNITY
Start: 2021-04-26 | End: 2022-11-03

## 2021-05-20 NOTE — PROGRESS NOTES
cc:  Sores on buttocks    Subjective:     Jennifer Gee is a 35 y.o. female presenting for sores on buttocks      Patient presents to the office for sores buttocks.  They have been there for about 6-8 weeks.  They itch, and then she scratches and they take time to heal.  Nothing has helped.  She has been trying neosporin.       Patient is requesting a refill of Benzamycin which has worked very well for her acne in the past.  She has not had it for couple years but would like to have it refilled at this time.    Patient takes Metformin for polycystic ovarian syndrome.  She is not needing a refill at this time.    Review of systems:  See above.   Denies any symptoms unless previously indicated.        Current Outpatient Medications:   •  metFORMIN ER (GLUCOPHAGE XR) 500 MG TABLET SR 24 HR, Take 2 Tablets by mouth 2 times a day., Disp: 360 tablet, Rfl: 1  •  sulfamethoxazole-trimethoprim (BACTRIM DS) 800-160 MG tablet, Take 1 tablet by mouth 2 times a day., Disp: 20 tablet, Rfl: 0  •  benzoyl peroxide-erythromycin (BENZAMYCIN) gel, Apply to affected area bid, Disp: 46.6 g, Rfl: 6  •  betamethasone dipropionate (DIPROLENE) 0.05 % Ointment, Apply a small amount to affected area twice a day no more than 10 days, Disp: 50 g, Rfl: 0  •  cyclobenzaprine (FLEXERIL) 10 mg Tab, TAKE ONE TABLET BY MOUTH THREE TIMES A DAY AS NEEDED, Disp: 30 tablet, Rfl: 3  •  ibuprofen (MOTRIN) 800 MG Tab, TAKE ONE TABLET BY MOUTH EVERY 8 HOURS AS NEEDED, Disp: 20 tablet, Rfl: 3  •  omeprazole (PRILOSEC) 20 MG delayed-release capsule, TAKE ONE CAPSULE BY MOUTH DAILY --PRILOSEC, Disp: 90 capsule, Rfl: 1  •  NON SPECIFIED, Massage therapy for neuropathy., Disp: 1 Each, Rfl: 12  •  lamoTRIgine (LAMICTAL) 25 MG Tab, Take 50 mg by mouth 2 times a day. Indications: Manic-Depression, Disp: , Rfl:   •  traZODone (DESYREL) 100 MG Tab, TAKE ONE TABLET BY MOUTH EVERY NIGHT AT BEDTIME -GENERIC FOR DESYREL, Disp: 30 Tab, Rfl: 0  •  albuterol 108 (90  "Base) MCG/ACT Aero Soln inhalation aerosol, Inhale 2 Puffs by mouth every 6 hours as needed for Shortness of Breath., Disp: 8.5 g, Rfl: 4  •  clonazePAM (KLONOPIN) 0.5 MG Tab, , Disp: , Rfl:   •  fluticasone (FLONASE) 50 MCG/ACT nasal spray, , Disp: , Rfl:   •  lamoTRIgine (LAMICTAL) 100 MG Tab, , Disp: , Rfl:   •  propranolol (INDERAL) 10 MG Tab, , Disp: , Rfl:     Allergies, past medical history, past surgical history, family history, social history reviewed and updated    Objective:     Vitals: /88   Pulse (!) 123   Temp 36.9 °C (98.5 °F) (Temporal)   Resp 16   Ht 1.575 m (5' 2\")   Wt 106 kg (234 lb)   SpO2 96%   BMI 42.80 kg/m²   General: Alert, pleasant, NAD  EYES:   PERRL, EOMI, no icterus or pallor.  Conjunctivae and lids normal.   HENT:  Normocephalic.  External ears normal.   Neck supple.  Respiratory: Normal respiratory effort.    Abdomen: Obese  Skin: Warm, dry, no rashes.  Patient has ulcers at the gluteal Cleft both right and left side.  They are superiorly located.  They appear to be scabbed as though they are trying to close.  Musculoskeletal: Gait is normal.  Moves all extremities well.    Extremities: Normal range of motion all extremities..   Neurological: No tremors, sensation grossly intact, gait is normal, CN2-12 intact.  Psych:  Affect/mood is normal, judgement is good, memory is intact, grooming is appropriate.    Assessment/Plan:     Jennifer was seen today for scar.    Diagnoses and all orders for this visit:    Pressure injury of contiguous region involving back and buttock, stage 2, unspecified laterality (HCC)  -     sulfamethoxazole-trimethoprim (BACTRIM DS) 800-160 MG tablet; Take 1 tablet by mouth 2 times a day.  -     betamethasone dipropionate (DIPROLENE) 0.05 % Ointment; Apply a small amount to affected area twice a day no more than 10 days    We will start patient on Bactrim and betamethasone.  The plan will be to follow-up in 2 weeks, sooner if needed.  She is to use a " donut pillow.  She is to avoid sitting for long periods of time and must take a 15-minute break every 2 hours.  She is to use A&E ointment to help as well.    Acne vulgaris  -     benzoyl peroxide-erythromycin (BENZAMYCIN) gel; Apply to affected area bid    Medication refilled.    PCOS (polycystic ovarian syndrome)  -     metFORMIN ER (GLUCOPHAGE XR) 500 MG TABLET SR 24 HR; Take 2 Tablets by mouth 2 times a day.            Return in about 2 weeks (around 6/3/2021), or if symptoms worsen or fail to improve.    Please note that this dictation was created using voice recognition software. I have made every reasonable attempt to correct obvious errors, but expect that there are errors of grammar and possible content that I did not discover before finalizing note.

## 2021-06-10 ENCOUNTER — APPOINTMENT (OUTPATIENT)
Dept: MEDICAL GROUP | Facility: CLINIC | Age: 36
End: 2021-06-10
Payer: COMMERCIAL

## 2021-07-21 ENCOUNTER — PATIENT MESSAGE (OUTPATIENT)
Dept: MEDICAL GROUP | Facility: CLINIC | Age: 36
End: 2021-07-21

## 2021-07-21 DIAGNOSIS — S93.402A SPRAIN OF LEFT ANKLE, UNSPECIFIED LIGAMENT, INITIAL ENCOUNTER: ICD-10-CM

## 2021-07-21 RX ORDER — IBUPROFEN 800 MG/1
800 TABLET ORAL EVERY 8 HOURS PRN
Qty: 40 TABLET | Refills: 3 | Status: SHIPPED | OUTPATIENT
Start: 2021-07-21 | End: 2021-11-03

## 2021-08-19 ENCOUNTER — HOSPITAL ENCOUNTER (OUTPATIENT)
Facility: MEDICAL CENTER | Age: 36
End: 2021-08-19
Attending: PHYSICIAN ASSISTANT
Payer: COMMERCIAL

## 2021-08-19 ENCOUNTER — OFFICE VISIT (OUTPATIENT)
Dept: MEDICAL GROUP | Facility: CLINIC | Age: 36
End: 2021-08-19
Payer: COMMERCIAL

## 2021-08-19 VITALS
BODY MASS INDEX: 41.22 KG/M2 | SYSTOLIC BLOOD PRESSURE: 116 MMHG | WEIGHT: 224 LBS | OXYGEN SATURATION: 97 % | HEIGHT: 62 IN | HEART RATE: 97 BPM | TEMPERATURE: 98.1 F | DIASTOLIC BLOOD PRESSURE: 84 MMHG

## 2021-08-19 DIAGNOSIS — Z11.52 ENCOUNTER FOR SCREENING FOR COVID-19: ICD-10-CM

## 2021-08-19 DIAGNOSIS — L89.42 PRESSURE INJURY OF CONTIGUOUS REGION INVOLVING BACK AND BUTTOCK, STAGE 2, UNSPECIFIED LATERALITY (HCC): ICD-10-CM

## 2021-08-19 DIAGNOSIS — E28.2 PCOS (POLYCYSTIC OVARIAN SYNDROME): ICD-10-CM

## 2021-08-19 DIAGNOSIS — J30.2 SEASONAL ALLERGIES: ICD-10-CM

## 2021-08-19 PROCEDURE — 99214 OFFICE O/P EST MOD 30 MIN: CPT | Performed by: PHYSICIAN ASSISTANT

## 2021-08-19 PROCEDURE — U0003 INFECTIOUS AGENT DETECTION BY NUCLEIC ACID (DNA OR RNA); SEVERE ACUTE RESPIRATORY SYNDROME CORONAVIRUS 2 (SARS-COV-2) (CORONAVIRUS DISEASE [COVID-19]), AMPLIFIED PROBE TECHNIQUE, MAKING USE OF HIGH THROUGHPUT TECHNOLOGIES AS DESCRIBED BY CMS-2020-01-R: HCPCS

## 2021-08-19 PROCEDURE — U0005 INFEC AGEN DETEC AMPLI PROBE: HCPCS

## 2021-08-19 RX ORDER — METFORMIN HYDROCHLORIDE 500 MG/1
1000 TABLET, EXTENDED RELEASE ORAL 2 TIMES DAILY
Qty: 360 TABLET | Refills: 1 | Status: SHIPPED | OUTPATIENT
Start: 2021-08-19 | End: 2022-01-20

## 2021-08-19 RX ORDER — FLUTICASONE PROPIONATE 50 MCG
2 SPRAY, SUSPENSION (ML) NASAL DAILY
Qty: 16 G | Refills: 5 | Status: SHIPPED | OUTPATIENT
Start: 2021-08-19

## 2021-08-19 RX ORDER — LORATADINE 10 MG/1
10 TABLET ORAL DAILY
Qty: 30 TABLET | Refills: 3 | Status: SHIPPED | OUTPATIENT
Start: 2021-08-19

## 2021-08-19 RX ORDER — SULFAMETHOXAZOLE AND TRIMETHOPRIM 800; 160 MG/1; MG/1
1 TABLET ORAL 2 TIMES DAILY
Qty: 20 TABLET | Refills: 0 | Status: SHIPPED
Start: 2021-08-19 | End: 2021-09-21
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
## 2021-08-20 DIAGNOSIS — Z11.52 ENCOUNTER FOR SCREENING FOR COVID-19: ICD-10-CM

## 2021-08-20 LAB
COVID ORDER STATUS COVID19: NORMAL
SARS-COV-2 RNA RESP QL NAA+PROBE: NOTDETECTED
SPECIMEN SOURCE: NORMAL

## 2021-09-09 ENCOUNTER — OFFICE VISIT (OUTPATIENT)
Dept: URGENT CARE | Facility: PHYSICIAN GROUP | Age: 36
End: 2021-09-09
Payer: COMMERCIAL

## 2021-09-09 ENCOUNTER — HOSPITAL ENCOUNTER (OUTPATIENT)
Facility: MEDICAL CENTER | Age: 36
End: 2021-09-09
Attending: PHYSICIAN ASSISTANT
Payer: COMMERCIAL

## 2021-09-09 VITALS
HEIGHT: 68 IN | OXYGEN SATURATION: 98 % | RESPIRATION RATE: 16 BRPM | DIASTOLIC BLOOD PRESSURE: 80 MMHG | BODY MASS INDEX: 36.07 KG/M2 | SYSTOLIC BLOOD PRESSURE: 120 MMHG | HEART RATE: 102 BPM | WEIGHT: 238 LBS | TEMPERATURE: 98.7 F

## 2021-09-09 DIAGNOSIS — B34.9 NONSPECIFIC SYNDROME SUGGESTIVE OF VIRAL ILLNESS: ICD-10-CM

## 2021-09-09 DIAGNOSIS — R05.9 COUGH: ICD-10-CM

## 2021-09-09 PROCEDURE — 99214 OFFICE O/P EST MOD 30 MIN: CPT | Performed by: PHYSICIAN ASSISTANT

## 2021-09-09 PROCEDURE — U0003 INFECTIOUS AGENT DETECTION BY NUCLEIC ACID (DNA OR RNA); SEVERE ACUTE RESPIRATORY SYNDROME CORONAVIRUS 2 (SARS-COV-2) (CORONAVIRUS DISEASE [COVID-19]), AMPLIFIED PROBE TECHNIQUE, MAKING USE OF HIGH THROUGHPUT TECHNOLOGIES AS DESCRIBED BY CMS-2020-01-R: HCPCS

## 2021-09-09 RX ORDER — DEXTROMETHORPHAN HYDROBROMIDE AND PROMETHAZINE HYDROCHLORIDE 15; 6.25 MG/5ML; MG/5ML
5 SYRUP ORAL EVERY 4 HOURS PRN
Qty: 120 ML | Refills: 0 | Status: SHIPPED
Start: 2021-09-09 | End: 2022-06-01

## 2021-09-09 ASSESSMENT — ENCOUNTER SYMPTOMS
HEADACHES: 1
FEVER: 1
RHINORRHEA: 1
WHEEZING: 0
COUGH: 1
MYALGIAS: 1
SORE THROAT: 0
SHORTNESS OF BREATH: 1
SWEATS: 1

## 2021-09-09 NOTE — PROGRESS NOTES
Subjective:   Jennifer Gee is a 36 y.o. female who presents for Fever, Congestion, and Cough        Probable exposure.    Cough  This is a new problem. The current episode started in the past 7 days. The problem has been gradually worsening. The problem occurs constantly. The cough is productive of sputum. Associated symptoms include a fever (Tmax 102F- improved), headaches, myalgias, nasal congestion, rhinorrhea, shortness of breath and sweats. Pertinent negatives include no chest pain, ear congestion, ear pain, sore throat or wheezing. Associated symptoms comments: Loss of taste and smell. Nothing aggravates the symptoms. Treatments tried: flexeril. The treatment provided mild relief. Her past medical history is significant for asthma and pneumonia.     Review of Systems   Constitutional: Positive for fever (Tmax 102F- improved).   HENT: Positive for rhinorrhea. Negative for ear pain and sore throat.    Respiratory: Positive for cough and shortness of breath. Negative for wheezing.    Cardiovascular: Negative for chest pain.   Musculoskeletal: Positive for myalgias.   Neurological: Positive for headaches.       PMH:  has a past medical history of Bladder infection (2012), Borderline personality disorder (HCC), Chickenpox, Depression, Gastritis, History of dental surgery, Influenza, Obesity, Pneumonia (2007), and Restless leg syndrome.  MEDS:   Current Outpatient Medications:   •  promethazine-dextromethorphan (PROMETHAZINE-DM) 6.25-15 MG/5ML syrup, Take 5 mL by mouth every four hours as needed for Cough., Disp: 120 mL, Rfl: 0  •  sulfamethoxazole-trimethoprim (BACTRIM DS) 800-160 MG tablet, Take 1 Tablet by mouth 2 times a day., Disp: 20 Tablet, Rfl: 0  •  loratadine (CLARITIN) 10 MG Tab, Take 1 Tablet by mouth every day., Disp: 30 Tablet, Rfl: 3  •  fluticasone (FLONASE) 50 MCG/ACT nasal spray, Administer 2 Sprays into affected nostril(S) every day., Disp: 16 g, Rfl: 5  •  metFORMIN ER (GLUCOPHAGE XR) 500  "MG TABLET SR 24 HR, Take 2 Tablets by mouth 2 times a day., Disp: 360 Tablet, Rfl: 1  •  ibuprofen (MOTRIN) 800 MG Tab, Take 1 tablet by mouth every 8 hours as needed., Disp: 40 tablet, Rfl: 3  •  clonazePAM (KLONOPIN) 0.5 MG Tab, , Disp: , Rfl:   •  lamoTRIgine (LAMICTAL) 100 MG Tab, , Disp: , Rfl:   •  propranolol (INDERAL) 10 MG Tab, , Disp: , Rfl:   •  benzoyl peroxide-erythromycin (BENZAMYCIN) gel, Apply to affected area bid, Disp: 46.6 g, Rfl: 6  •  betamethasone dipropionate (DIPROLENE) 0.05 % Ointment, Apply a small amount to affected area twice a day no more than 10 days, Disp: 50 g, Rfl: 0  •  cyclobenzaprine (FLEXERIL) 10 mg Tab, TAKE ONE TABLET BY MOUTH THREE TIMES A DAY AS NEEDED, Disp: 30 tablet, Rfl: 3  •  omeprazole (PRILOSEC) 20 MG delayed-release capsule, TAKE ONE CAPSULE BY MOUTH DAILY --PRILOSEC, Disp: 90 capsule, Rfl: 1  •  NON SPECIFIED, Massage therapy for neuropathy., Disp: 1 Each, Rfl: 12  •  traZODone (DESYREL) 100 MG Tab, TAKE ONE TABLET BY MOUTH EVERY NIGHT AT BEDTIME -GENERIC FOR DESYREL, Disp: 30 Tab, Rfl: 0  •  albuterol 108 (90 Base) MCG/ACT Aero Soln inhalation aerosol, Inhale 2 Puffs by mouth every 6 hours as needed for Shortness of Breath., Disp: 8.5 g, Rfl: 4  ALLERGIES: No Known Allergies  SURGHX:   Past Surgical History:   Procedure Laterality Date   • TONSILLECTOMY Bilateral 08/05/2019   • UVULOPHARYNGOPALATOPLASTY       SOCHX:  reports that she has never smoked. She has never used smokeless tobacco. She reports current drug use. Drugs: Inhaled and Marijuana. She reports that she does not drink alcohol.  FH: Family history was reviewed, no pertinent findings to report   Objective:   /80   Pulse (!) 102   Temp 37.1 °C (98.7 °F)   Resp 16   Ht 1.727 m (5' 8\")   Wt 108 kg (238 lb)   SpO2 98%   BMI 36.19 kg/m²   Physical Exam  Vitals reviewed.   Constitutional:       General: She is not in acute distress.     Appearance: Normal appearance. She is well-developed. She is " not toxic-appearing.   HENT:      Head: Normocephalic and atraumatic.      Right Ear: External ear normal.      Left Ear: External ear normal.      Nose: Congestion present.   Eyes:      General: Gaze aligned appropriately.   Cardiovascular:      Rate and Rhythm: Normal rate and regular rhythm.      Heart sounds: Normal heart sounds, S1 normal and S2 normal.   Pulmonary:      Effort: Pulmonary effort is normal. No respiratory distress.      Breath sounds: Normal breath sounds. No stridor. No decreased breath sounds, wheezing, rhonchi or rales.      Comments: Constant, dry spasmodic cough.  Musculoskeletal:      Cervical back: Neck supple.   Skin:     General: Skin is warm and dry.      Capillary Refill: Capillary refill takes less than 2 seconds.   Neurological:      Mental Status: She is alert and oriented to person, place, and time.      Comments: CN2-12 grossly intact   Psychiatric:         Speech: Speech normal.         Behavior: Behavior normal.           Assessment/Plan:   1. Nonspecific syndrome suggestive of viral illness    2. Cough  - COVID/SARS CoV-2 PCR; Future  - promethazine-dextromethorphan (PROMETHAZINE-DM) 6.25-15 MG/5ML syrup; Take 5 mL by mouth every four hours as needed for Cough.  Dispense: 120 mL; Refill: 0    Discussed with patient signs and symptoms most likely are due to a viral etiology.  High clinical suspicion for COVID-19.    Test for COVID-19. We will call/message back for results and appropriate further instructions. Instructed to sign up for MyChart if they have not already. Result will be released to [email or web address] application.   Symptomatic and supportive care:   Plenty of oral hydration and rest   Over the counter cough suppressant as directed.  Tylenol or ibuprofen for pain and fever as directed.   Saline nasal spray and Flonase as a decongestant.   Infection control measures at home. Stay away from people, Hand washing, covering sneeze/cough, disinfect surfaces.   Remain home from work,  school, and other populated environments. Work note provided with information of quarantine measures and CDC guidelines.     Return and ED precautions reviewed.  Red flag signs and symptoms reviewed.

## 2021-09-10 DIAGNOSIS — R05.9 COUGH: ICD-10-CM

## 2021-09-10 LAB — COVID ORDER STATUS COVID19: NORMAL

## 2021-09-12 LAB
SARS-COV-2 RNA RESP QL NAA+PROBE: DETECTED
SPECIMEN SOURCE: ABNORMAL

## 2021-09-13 ENCOUNTER — APPOINTMENT (OUTPATIENT)
Dept: RADIOLOGY | Facility: MEDICAL CENTER | Age: 36
End: 2021-09-13
Attending: EMERGENCY MEDICINE
Payer: COMMERCIAL

## 2021-09-13 ENCOUNTER — HOSPITAL ENCOUNTER (EMERGENCY)
Facility: MEDICAL CENTER | Age: 36
End: 2021-09-13
Attending: EMERGENCY MEDICINE
Payer: COMMERCIAL

## 2021-09-13 VITALS
SYSTOLIC BLOOD PRESSURE: 103 MMHG | HEIGHT: 68 IN | OXYGEN SATURATION: 92 % | DIASTOLIC BLOOD PRESSURE: 69 MMHG | WEIGHT: 220.24 LBS | HEART RATE: 100 BPM | RESPIRATION RATE: 20 BRPM | BODY MASS INDEX: 33.38 KG/M2 | TEMPERATURE: 99.3 F

## 2021-09-13 DIAGNOSIS — R11.2 NAUSEA AND VOMITING, INTRACTABILITY OF VOMITING NOT SPECIFIED, UNSPECIFIED VOMITING TYPE: ICD-10-CM

## 2021-09-13 DIAGNOSIS — R19.7 DIARRHEA, UNSPECIFIED TYPE: ICD-10-CM

## 2021-09-13 DIAGNOSIS — U07.1 COVID-19: ICD-10-CM

## 2021-09-13 LAB
ALBUMIN SERPL BCP-MCNC: 4.2 G/DL (ref 3.2–4.9)
ALBUMIN/GLOB SERPL: 1.4 G/DL
ALP SERPL-CCNC: 67 U/L (ref 30–99)
ALT SERPL-CCNC: 38 U/L (ref 2–50)
ANION GAP SERPL CALC-SCNC: 11 MMOL/L (ref 7–16)
APPEARANCE UR: ABNORMAL
AST SERPL-CCNC: 34 U/L (ref 12–45)
BACTERIA #/AREA URNS HPF: ABNORMAL /HPF
BASOPHILS # BLD AUTO: 0 % (ref 0–1.8)
BASOPHILS # BLD: 0 K/UL (ref 0–0.12)
BILIRUB SERPL-MCNC: 0.2 MG/DL (ref 0.1–1.5)
BILIRUB UR QL STRIP.AUTO: NEGATIVE
BUN SERPL-MCNC: 10 MG/DL (ref 8–22)
CALCIUM SERPL-MCNC: 8.5 MG/DL (ref 8.4–10.2)
CHLORIDE SERPL-SCNC: 97 MMOL/L (ref 96–112)
CO2 SERPL-SCNC: 26 MMOL/L (ref 20–33)
COLOR UR: YELLOW
CREAT SERPL-MCNC: 0.72 MG/DL (ref 0.5–1.4)
EOSINOPHIL # BLD AUTO: 0 K/UL (ref 0–0.51)
EOSINOPHIL NFR BLD: 0 % (ref 0–6.9)
EPI CELLS #/AREA URNS HPF: ABNORMAL /HPF
ERYTHROCYTE [DISTWIDTH] IN BLOOD BY AUTOMATED COUNT: 45.8 FL (ref 35.9–50)
GLOBULIN SER CALC-MCNC: 2.9 G/DL (ref 1.9–3.5)
GLUCOSE SERPL-MCNC: 100 MG/DL (ref 65–99)
GLUCOSE UR STRIP.AUTO-MCNC: NEGATIVE MG/DL
HCT VFR BLD AUTO: 42.1 % (ref 37–47)
HGB BLD-MCNC: 13.8 G/DL (ref 12–16)
IMM GRANULOCYTES # BLD AUTO: 0.01 K/UL (ref 0–0.11)
IMM GRANULOCYTES NFR BLD AUTO: 0.2 % (ref 0–0.9)
KETONES UR STRIP.AUTO-MCNC: ABNORMAL MG/DL
LEUKOCYTE ESTERASE UR QL STRIP.AUTO: NEGATIVE
LYMPHOCYTES # BLD AUTO: 0.95 K/UL (ref 1–4.8)
LYMPHOCYTES NFR BLD: 20.4 % (ref 22–41)
MCH RBC QN AUTO: 28.7 PG (ref 27–33)
MCHC RBC AUTO-ENTMCNC: 32.8 G/DL (ref 33.6–35)
MCV RBC AUTO: 87.5 FL (ref 81.4–97.8)
MICRO URNS: ABNORMAL
MONOCYTES # BLD AUTO: 0.21 K/UL (ref 0–0.85)
MONOCYTES NFR BLD AUTO: 4.5 % (ref 0–13.4)
MUCOUS THREADS #/AREA URNS HPF: ABNORMAL /HPF
NEUTROPHILS # BLD AUTO: 3.48 K/UL (ref 2–7.15)
NEUTROPHILS NFR BLD: 74.9 % (ref 44–72)
NITRITE UR QL STRIP.AUTO: NEGATIVE
NRBC # BLD AUTO: 0 K/UL
NRBC BLD-RTO: 0 /100 WBC
PH UR STRIP.AUTO: 5.5 [PH] (ref 5–8)
PLATELET # BLD AUTO: 206 K/UL (ref 164–446)
PMV BLD AUTO: 9.4 FL (ref 9–12.9)
POTASSIUM SERPL-SCNC: 3.7 MMOL/L (ref 3.6–5.5)
PROT SERPL-MCNC: 7.1 G/DL (ref 6–8.2)
PROT UR QL STRIP: NEGATIVE MG/DL
RBC # BLD AUTO: 4.81 M/UL (ref 4.2–5.4)
RBC # URNS HPF: ABNORMAL /HPF
RBC UR QL AUTO: NEGATIVE
SODIUM SERPL-SCNC: 134 MMOL/L (ref 135–145)
SP GR UR STRIP.AUTO: >=1.03
UNIDENT CRYS URNS QL MICRO: ABNORMAL /HPF
WBC # BLD AUTO: 4.7 K/UL (ref 4.8–10.8)
WBC #/AREA URNS HPF: ABNORMAL /HPF

## 2021-09-13 PROCEDURE — 81001 URINALYSIS AUTO W/SCOPE: CPT

## 2021-09-13 PROCEDURE — 80053 COMPREHEN METABOLIC PANEL: CPT

## 2021-09-13 PROCEDURE — 700105 HCHG RX REV CODE 258: Performed by: EMERGENCY MEDICINE

## 2021-09-13 PROCEDURE — 99284 EMERGENCY DEPT VISIT MOD MDM: CPT

## 2021-09-13 PROCEDURE — 85025 COMPLETE CBC W/AUTO DIFF WBC: CPT

## 2021-09-13 PROCEDURE — 36415 COLL VENOUS BLD VENIPUNCTURE: CPT

## 2021-09-13 RX ORDER — SODIUM CHLORIDE, SODIUM LACTATE, POTASSIUM CHLORIDE, CALCIUM CHLORIDE 600; 310; 30; 20 MG/100ML; MG/100ML; MG/100ML; MG/100ML
1000 INJECTION, SOLUTION INTRAVENOUS ONCE
Status: COMPLETED | OUTPATIENT
Start: 2021-09-13 | End: 2021-09-13

## 2021-09-13 RX ADMIN — SODIUM CHLORIDE, POTASSIUM CHLORIDE, SODIUM LACTATE AND CALCIUM CHLORIDE 1000 ML: 600; 310; 30; 20 INJECTION, SOLUTION INTRAVENOUS at 20:28

## 2021-09-13 ASSESSMENT — PAIN DESCRIPTION - PAIN TYPE
TYPE: ACUTE PAIN
TYPE: ACUTE PAIN

## 2021-09-14 NOTE — DISCHARGE INSTRUCTIONS
Rest, drink plenty of fluids return for worsening cough, shortness of breath, if unable to tolerate fluids or for other concerns.  Follow-up with your doctor.

## 2021-09-14 NOTE — ED PROVIDER NOTES
ED Provider Note    CHIEF COMPLAINT  Chief Complaint   Patient presents with   • Nausea/Vomiting/Diarrhea     started last week   • Coronavirus Screening     Dx w/ Covid yesterday       HPI  Jennifer Gee is a 36 y.o. female who presents to the emergency department complaining of nausea, vomiting, and diarrhea.  Patient states symptoms for about a week.  She states she has had nausea, vomiting, cough, congestion and general achiness.  She was diagnosed with Covid.  She comes in today because she is concerned that she is dehydrated from not having fluids.  She has associated vomiting diarrhea.  She has any chest pain she has mild shortness of breath.  She denies any other acute concerns or complaints.  No history of PE or DVT.  Denies any other aggravating alleviating associated complaints.  Denies diabetes but does have PCOS.    REVIEW OF SYSTEMS  See HPI for further details. All other systems are negative.    PAST MEDICAL HISTORY  Past Medical History:   Diagnosis Date   • Bladder infection 2012   • Borderline personality disorder (HCC)     cutter   • Chickenpox    • COVID-19    • Depression    • Gastritis    • History of dental surgery    • Influenza    • Obesity    • Pneumonia 2007   • Restless leg syndrome        FAMILY HISTORY  Family History   Problem Relation Age of Onset   • Hyperlipidemia Mother    • Hypertension Mother    • Alcohol/Drug Father    • Heart Failure Father    • Heart Disease Maternal Grandfather    • Cancer Maternal Grandfather         Colon   • Stroke Neg Hx    • Diabetes Neg Hx        SOCIAL HISTORY  Social History     Socioeconomic History   • Marital status: Single     Spouse name: Not on file   • Number of children: Not on file   • Years of education: Not on file   • Highest education level: Not on file   Occupational History   • Not on file   Tobacco Use   • Smoking status: Never Smoker   • Smokeless tobacco: Never Used   Vaping Use   • Vaping Use: Never used   Substance and Sexual  "Activity   • Alcohol use: No   • Drug use: Yes     Types: Inhaled, Marijuana     Comment: medical card, 3 puffs/day   • Sexual activity: Yes     Partners: Female, Male   Other Topics Concern   • Not on file   Social History Narrative   • Not on file     Social Determinants of Health     Financial Resource Strain:    • Difficulty of Paying Living Expenses:    Food Insecurity:    • Worried About Running Out of Food in the Last Year:    • Ran Out of Food in the Last Year:    Transportation Needs:    • Lack of Transportation (Medical):    • Lack of Transportation (Non-Medical):    Physical Activity:    • Days of Exercise per Week:    • Minutes of Exercise per Session:    Stress:    • Feeling of Stress :    Social Connections:    • Frequency of Communication with Friends and Family:    • Frequency of Social Gatherings with Friends and Family:    • Attends [employer] Services:    • Active Member of Clubs or Organizations:    • Attends Club or Organization Meetings:    • Marital Status:    Intimate Partner Violence:    • Fear of Current or Ex-Partner:    • Emotionally Abused:    • Physically Abused:    • Sexually Abused:        SURGICAL HISTORY  Past Surgical History:   Procedure Laterality Date   • TONSILLECTOMY Bilateral 08/05/2019   • UVULOPHARYNGOPALATOPLASTY         CURRENT MEDICATIONS  Home Medications    **Home medications have not yet been reviewed for this encounter**         ALLERGIES  No Known Allergies    PHYSICAL EXAM  VITAL SIGNS: /69   Pulse (!) 104   Temp 36.8 °C (98.2 °F) (Temporal)   Resp 18   Ht 1.727 m (5' 8\")   Wt 99.9 kg (220 lb 3.8 oz)   LMP 09/07/2021   SpO2 93%   BMI 33.49 kg/m²      Constitutional: Awake alert nontoxic, no acute distress  HENT: Normocephalic, Atraumatic  Eyes: PERRL, EOMI, Conjunctiva normal, No discharge.   Neck: Normal range of motion  Cardiovascular: Mildly tachycardic, Normal rhythm, No murmurs, No rubs, No gallops.   Thorax & Lungs: Normal breath sounds, No " respiratory distress, No wheezing  Abdomen: Bowel sounds normal, Soft, No tenderness  Back: No tenderness, No CVA tenderness.   Musculoskeletal: Good range of motion in all major joints.  Neurologic: Alert,  No focal deficits noted.   Psychiatric: Affect normal    Results for orders placed or performed during the hospital encounter of 09/13/21   CBC WITH DIFFERENTIAL   Result Value Ref Range    WBC 4.7 (L) 4.8 - 10.8 K/uL    RBC 4.81 4.20 - 5.40 M/uL    Hemoglobin 13.8 12.0 - 16.0 g/dL    Hematocrit 42.1 37.0 - 47.0 %    MCV 87.5 81.4 - 97.8 fL    MCH 28.7 27.0 - 33.0 pg    MCHC 32.8 (L) 33.6 - 35.0 g/dL    RDW 45.8 35.9 - 50.0 fL    Platelet Count 206 164 - 446 K/uL    MPV 9.4 9.0 - 12.9 fL    Neutrophils-Polys 74.90 (H) 44.00 - 72.00 %    Lymphocytes 20.40 (L) 22.00 - 41.00 %    Monocytes 4.50 0.00 - 13.40 %    Eosinophils 0.00 0.00 - 6.90 %    Basophils 0.00 0.00 - 1.80 %    Immature Granulocytes 0.20 0.00 - 0.90 %    Nucleated RBC 0.00 /100 WBC    Neutrophils (Absolute) 3.48 2.00 - 7.15 K/uL    Lymphs (Absolute) 0.95 (L) 1.00 - 4.80 K/uL    Monos (Absolute) 0.21 0.00 - 0.85 K/uL    Eos (Absolute) 0.00 0.00 - 0.51 K/uL    Baso (Absolute) 0.00 0.00 - 0.12 K/uL    Immature Granulocytes (abs) 0.01 0.00 - 0.11 K/uL    NRBC (Absolute) 0.00 K/uL   COMP METABOLIC PANEL   Result Value Ref Range    Sodium 134 (L) 135 - 145 mmol/L    Potassium 3.7 3.6 - 5.5 mmol/L    Chloride 97 96 - 112 mmol/L    Co2 26 20 - 33 mmol/L    Anion Gap 11.0 7.0 - 16.0    Glucose 100 (H) 65 - 99 mg/dL    Bun 10 8 - 22 mg/dL    Creatinine 0.72 0.50 - 1.40 mg/dL    Calcium 8.5 8.4 - 10.2 mg/dL    AST(SGOT) 34 12 - 45 U/L    ALT(SGPT) 38 2 - 50 U/L    Alkaline Phosphatase 67 30 - 99 U/L    Total Bilirubin 0.2 0.1 - 1.5 mg/dL    Albumin 4.2 3.2 - 4.9 g/dL    Total Protein 7.1 6.0 - 8.2 g/dL    Globulin 2.9 1.9 - 3.5 g/dL    A-G Ratio 1.4 g/dL   URINALYSIS CULTURE, IF INDICATED    Specimen: Urine   Result Value Ref Range    Color Yellow      Character Sl Cloudy (A)     Specific Gravity >=1.030 <1.035    Ph 5.5 5.0 - 8.0    Glucose Negative Negative mg/dL    Ketones Trace (A) Negative mg/dL    Protein Negative Negative mg/dL    Bilirubin Negative Negative    Nitrite Negative Negative    Leukocyte Esterase Negative Negative    Occult Blood Negative Negative    Micro Urine Req Microscopic    ESTIMATED GFR   Result Value Ref Range    GFR If African American >60 >60 mL/min/1.73 m 2    GFR If Non African American >60 >60 mL/min/1.73 m 2   URINE MICROSCOPIC (W/UA)   Result Value Ref Range    WBC 0-2 /hpf    RBC 0-2 /hpf    Bacteria Few (A) None /hpf    Epithelial Cells Few Few /hpf    Mucous Threads Few /hpf    Urine Crystals Mod Amorphous /hpf      RADIOLOGY/PROCEDURES  No orders to display         COURSE & MEDICAL DECISION MAKING  Pertinent Labs & Imaging studies reviewed. (See chart for details)    The patient presents with known diagnosis of COVID-19.  She has cough, achiness nausea, vomiting and diarrhea.  She is concerned that she is dehydrated.    She is currently afebrile and nontoxic.  No signs of sepsis, her lungs are clear without hypoxemia.  I do not think she is likely to have a secondary pneumonia.  She is not septic or toxic appearing.    Reports history of vomiting and diarrhea.  Have ordered labs and x-ray.  Because of her fluid losses she is given IV fluids.    Indication for IV fluids is fluid losses from vomiting and diarrhea.  She is reassessed after fluids and has improved.      I have ordered an x-ray to see her degree of lung inflammation but she does not want this.  Since she is not hypoxemic.    Labs are reassuring.  She is feeling much better after some fluids.  She is tolerating oral fluids.    This point her abdomen is benign she has no signs of UTI she is nontoxic-appearing.  She denies pregnancy.  She has no abdominal pain.  She will be discharged home with return precautions for COVID-19 as well as dehydration.  I ordered an  x-ray but the patient was to hold off I am concerned about this time.    I consider Regeneron in this patient but she has been symptomatic for greater than 10 days even though she meets the other criteria this is an exclusion.  She is not hypoxemic there is no indication for Decadron.  Questions are answered, she is agreeable to plan.    Romelia Estrada P.A.-C.  [address] [address]  [phone]                FINAL IMPRESSION  1. COVID-19     2. Diarrhea, unspecified type     3. Nausea and vomiting, intractability of vomiting not specified, unspecified vomiting type         2.   3.         Electronically signed by: Shiv Ricks M.D., 9/13/2021 8:28 PM

## 2021-09-14 NOTE — ED TRIAGE NOTES
"Chief Complaint   Patient presents with   • Nausea/Vomiting/Diarrhea     started last week   • Coronavirus Screening     Dx w/ Covid yesterday     /92   Pulse (!) 107   Temp 36.8 °C (98.2 °F) (Temporal)   Resp 18   Ht 1.727 m (5' 8\")   Wt 99.9 kg (220 lb 3.8 oz)   LMP 09/07/2021   SpO2 92%   BMI 33.49 kg/m²     Has this patient been vaccinated for COVID NO  If not, would they like to be vaccinated while in the ER if eligible?  NO  Would the patient like to speak with the ERP about the possibility of receiving the COVID vaccine today before making a decision? NO    "

## 2021-09-21 ENCOUNTER — OFFICE VISIT (OUTPATIENT)
Dept: MEDICAL GROUP | Facility: CLINIC | Age: 36
End: 2021-09-21
Payer: COMMERCIAL

## 2021-09-21 VITALS
HEART RATE: 97 BPM | RESPIRATION RATE: 16 BRPM | HEIGHT: 62 IN | OXYGEN SATURATION: 97 % | WEIGHT: 219 LBS | BODY MASS INDEX: 40.3 KG/M2 | SYSTOLIC BLOOD PRESSURE: 122 MMHG | TEMPERATURE: 97.2 F | DIASTOLIC BLOOD PRESSURE: 82 MMHG

## 2021-09-21 DIAGNOSIS — U07.1 COVID-19: ICD-10-CM

## 2021-09-21 PROCEDURE — 99213 OFFICE O/P EST LOW 20 MIN: CPT | Performed by: PHYSICIAN ASSISTANT

## 2021-09-21 RX ORDER — METHYLPREDNISOLONE 4 MG/1
TABLET ORAL
Qty: 21 TABLET | Refills: 0 | Status: SHIPPED
Start: 2021-09-21 | End: 2022-06-01

## 2021-09-21 ASSESSMENT — FIBROSIS 4 INDEX: FIB4 SCORE: 0.96

## 2021-09-21 NOTE — PROGRESS NOTES
cc:  Return to work note    Subjective:     Jennifer Gee is a 36 y.o. female presenting for return to work note      Patient presents to the office for return to work note.  Patient states that she still has a cough but she needs a note to return to work, working at home. She is not allowed to return to the office until she is symptom free.    Patient is also requesting a note to not have the Covid vaccine. She states that with her health issues, she is concerned about having the vaccine and needs a note in order to avoid issues at work.    Review of systems:  See above.   Denies any symptoms unless previously indicated.        Current Outpatient Medications:   •  methylPREDNISolone (MEDROL DOSEPAK) 4 MG Tablet Therapy Pack, Follow schedule on package instructions., Disp: 21 Tablet, Rfl: 0  •  omeprazole (PRILOSEC) 20 MG delayed-release capsule, TAKE ONE CAPSULE BY MOUTH DAILY, Disp: 90 Capsule, Rfl: 2  •  promethazine-dextromethorphan (PROMETHAZINE-DM) 6.25-15 MG/5ML syrup, Take 5 mL by mouth every four hours as needed for Cough., Disp: 120 mL, Rfl: 0  •  loratadine (CLARITIN) 10 MG Tab, Take 1 Tablet by mouth every day., Disp: 30 Tablet, Rfl: 3  •  fluticasone (FLONASE) 50 MCG/ACT nasal spray, Administer 2 Sprays into affected nostril(S) every day., Disp: 16 g, Rfl: 5  •  metFORMIN ER (GLUCOPHAGE XR) 500 MG TABLET SR 24 HR, Take 2 Tablets by mouth 2 times a day., Disp: 360 Tablet, Rfl: 1  •  ibuprofen (MOTRIN) 800 MG Tab, Take 1 tablet by mouth every 8 hours as needed., Disp: 40 tablet, Rfl: 3  •  clonazePAM (KLONOPIN) 0.5 MG Tab, , Disp: , Rfl:   •  lamoTRIgine (LAMICTAL) 100 MG Tab, , Disp: , Rfl:   •  propranolol (INDERAL) 10 MG Tab, , Disp: , Rfl:   •  benzoyl peroxide-erythromycin (BENZAMYCIN) gel, Apply to affected area bid, Disp: 46.6 g, Rfl: 6  •  betamethasone dipropionate (DIPROLENE) 0.05 % Ointment, Apply a small amount to affected area twice a day no more than 10 days, Disp: 50 g, Rfl: 0  •   "cyclobenzaprine (FLEXERIL) 10 mg Tab, TAKE ONE TABLET BY MOUTH THREE TIMES A DAY AS NEEDED, Disp: 30 tablet, Rfl: 3  •  NON SPECIFIED, Massage therapy for neuropathy., Disp: 1 Each, Rfl: 12  •  traZODone (DESYREL) 100 MG Tab, TAKE ONE TABLET BY MOUTH EVERY NIGHT AT BEDTIME -GENERIC FOR DESYREL, Disp: 30 Tab, Rfl: 0  •  albuterol 108 (90 Base) MCG/ACT Aero Soln inhalation aerosol, Inhale 2 Puffs by mouth every 6 hours as needed for Shortness of Breath., Disp: 8.5 g, Rfl: 4    Allergies, past medical history, past surgical history, family history, social history reviewed and updated    Objective:     Vitals: /82 (BP Location: Left arm, Patient Position: Sitting, BP Cuff Size: Large adult)   Pulse 97   Temp 36.2 °C (97.2 °F) (Temporal)   Resp 16   Ht 1.575 m (5' 2\") Comment: obtained from chart  Wt 99.3 kg (219 lb) Comment: with shoes on  LMP 09/07/2021   SpO2 97%   BMI 40.06 kg/m²   General: Alert, pleasant, NAD  EYES:   PERRL, EOMI, no icterus or pallor.  Conjunctivae and lids normal.   HENT:  Normocephalic.  External ears normal.   Neck supple.     Respiratory: Normal respiratory effort. Cough present. Abdomen: obese.   Skin: Warm, dry, no rashes.  Musculoskeletal: Gait is normal.  Moves all extremities well.    Extremities: normal range of motion all extremities.   Neurological: No tremors, sensation grossly intact, gait is normal, CN2-12 intact.  Psych:  Affect/mood is normal, judgement is good, memory is intact, grooming is appropriate.    Assessment/Plan:     Jennifer was seen today for coronavirus screening and paperwork.    Diagnoses and all orders for this visit:    COVID-19  -     methylPREDNISolone (MEDROL DOSEPAK) 4 MG Tablet Therapy Pack; Follow schedule on package instructions.      Provided patient with a note to return back to work-working at home as she is still having symptoms. Advised patient that we are unable to write notes to employers for patients to avoid the vaccine. Patient has " verbalized understanding.      No follow-ups on file.    Please note that this dictation was created using voice recognition software. I have made every reasonable attempt to correct obvious errors, but expect that there are errors of grammar and possible content that I did not discover before finalizing note.

## 2021-09-21 NOTE — LETTER
September 21, 2021       Patient: Jennifer Gee   YOB: 1985   Date of Visit: 9/21/2021         To Whom It May Concern:    In my medical opinion, I recommend that Jennifer Gee can work from home.      If you have any questions or concerns, please don't hesitate to call [phone]          Sincerely,          Romelia Estrada P.A.-C.  Electronically Signed

## 2021-11-03 DIAGNOSIS — G60.0 CHARCOT-MARIE-TOOTH DISEASE OF NEURONAL TYPE: ICD-10-CM

## 2021-11-03 DIAGNOSIS — S93.402A SPRAIN OF LEFT ANKLE, UNSPECIFIED LIGAMENT, INITIAL ENCOUNTER: ICD-10-CM

## 2021-11-03 NOTE — TELEPHONE ENCOUNTER
Was the patient seen in the last year in this department? Yes    Does patient have an active prescription for medications requested? Yes    Received Request Via: Pharmacy    Admission on 09/13/2021, Discharged on 09/13/2021   Component Date Value   • WBC 09/13/2021 4.7*   • RBC 09/13/2021 4.81    • Hemoglobin 09/13/2021 13.8    • Hematocrit 09/13/2021 42.1    • MCV 09/13/2021 87.5    • MCH 09/13/2021 28.7    • MCHC 09/13/2021 32.8*   • RDW 09/13/2021 45.8    • Platelet Count 09/13/2021 206    • MPV 09/13/2021 9.4    • Neutrophils-Polys 09/13/2021 74.90*   • Lymphocytes 09/13/2021 20.40*   • Monocytes 09/13/2021 4.50    • Eosinophils 09/13/2021 0.00    • Basophils 09/13/2021 0.00    • Immature Granulocytes 09/13/2021 0.20    • Nucleated RBC 09/13/2021 0.00    • Neutrophils (Absolute) 09/13/2021 3.48    • Lymphs (Absolute) 09/13/2021 0.95*   • Monos (Absolute) 09/13/2021 0.21    • Eos (Absolute) 09/13/2021 0.00    • Baso (Absolute) 09/13/2021 0.00    • Immature Granulocytes (a* 09/13/2021 0.01    • NRBC (Absolute) 09/13/2021 0.00    • Sodium 09/13/2021 134*   • Potassium 09/13/2021 3.7    • Chloride 09/13/2021 97    • Co2 09/13/2021 26    • Anion Gap 09/13/2021 11.0    • Glucose 09/13/2021 100*   • Bun 09/13/2021 10    • Creatinine 09/13/2021 0.72    • Calcium 09/13/2021 8.5    • AST(SGOT) 09/13/2021 34    • ALT(SGPT) 09/13/2021 38    • Alkaline Phosphatase 09/13/2021 67    • Total Bilirubin 09/13/2021 0.2    • Albumin 09/13/2021 4.2    • Total Protein 09/13/2021 7.1    • Globulin 09/13/2021 2.9    • A-G Ratio 09/13/2021 1.4    • Color 09/13/2021 Yellow    • Character 09/13/2021 Sl Cloudy*   • Specific Gravity 09/13/2021 >=1.030    • Ph 09/13/2021 5.5    • Glucose 09/13/2021 Negative    • Ketones 09/13/2021 Trace*   • Protein 09/13/2021 Negative    • Bilirubin 09/13/2021 Negative    • Nitrite 09/13/2021 Negative    • Leukocyte Esterase 09/13/2021 Negative    • Occult Blood 09/13/2021 Negative    • Micro Urine Req  09/13/2021 Microscopic    • GFR If  09/13/2021 >60    • GFR If Non  [employer]meri* 09/13/2021 >60    • WBC 09/13/2021 0-2    • RBC 09/13/2021 0-2    • Bacteria 09/13/2021 Few*   • Epithelial Cells 09/13/2021 Few    • Mucous Threads 09/13/2021 Few    • Urine Crystals 09/13/2021 Mod Amorphous    Hospital Outpatient Visit on 09/09/2021   Component Date Value   • COVID Order Status 09/09/2021 Received    • SARS-CoV-2 Source 09/09/2021 Nasal Swab    • SARS-CoV-2 by PCR 09/09/2021 DETECTED*   Hospital Outpatient Visit on 08/19/2021   Component Date Value   • SARS-CoV-2 Source 08/19/2021 Nasal Swab    • SARS-CoV-2 by PCR 08/19/2021 NotDetected    • COVID Order Status 08/19/2021 Received    ]

## 2021-11-04 RX ORDER — IBUPROFEN 800 MG/1
TABLET ORAL
Qty: 40 TABLET | Refills: 0 | Status: SHIPPED | OUTPATIENT
Start: 2021-11-04 | End: 2021-12-06

## 2021-11-04 RX ORDER — CYCLOBENZAPRINE HCL 10 MG
TABLET ORAL
Qty: 30 TABLET | Refills: 0 | Status: SHIPPED | OUTPATIENT
Start: 2021-11-04 | End: 2021-12-06

## 2022-01-20 DIAGNOSIS — E78.49 OTHER HYPERLIPIDEMIA: ICD-10-CM

## 2022-01-20 DIAGNOSIS — E55.9 VITAMIN D DEFICIENCY: ICD-10-CM

## 2022-01-20 DIAGNOSIS — E28.2 PCOS (POLYCYSTIC OVARIAN SYNDROME): ICD-10-CM

## 2022-02-12 ENCOUNTER — HOSPITAL ENCOUNTER (OUTPATIENT)
Dept: LAB | Facility: MEDICAL CENTER | Age: 37
End: 2022-02-12
Attending: PHYSICIAN ASSISTANT
Payer: COMMERCIAL

## 2022-02-12 DIAGNOSIS — E78.49 OTHER HYPERLIPIDEMIA: ICD-10-CM

## 2022-02-12 DIAGNOSIS — E28.2 PCOS (POLYCYSTIC OVARIAN SYNDROME): ICD-10-CM

## 2022-02-12 DIAGNOSIS — E55.9 VITAMIN D DEFICIENCY: ICD-10-CM

## 2022-02-12 LAB
25(OH)D3 SERPL-MCNC: 19 NG/ML (ref 30–100)
ALBUMIN SERPL BCP-MCNC: 4.5 G/DL (ref 3.2–4.9)
ALBUMIN/GLOB SERPL: 1.7 G/DL
ALP SERPL-CCNC: 76 U/L (ref 30–99)
ALT SERPL-CCNC: 27 U/L (ref 2–50)
ANION GAP SERPL CALC-SCNC: 12 MMOL/L (ref 7–16)
AST SERPL-CCNC: 21 U/L (ref 12–45)
BILIRUB SERPL-MCNC: 0.2 MG/DL (ref 0.1–1.5)
BUN SERPL-MCNC: 12 MG/DL (ref 8–22)
CALCIUM SERPL-MCNC: 9.4 MG/DL (ref 8.5–10.5)
CHLORIDE SERPL-SCNC: 103 MMOL/L (ref 96–112)
CHOLEST SERPL-MCNC: 186 MG/DL (ref 100–199)
CO2 SERPL-SCNC: 23 MMOL/L (ref 20–33)
CREAT SERPL-MCNC: 0.57 MG/DL (ref 0.5–1.4)
EST. AVERAGE GLUCOSE BLD GHB EST-MCNC: 114 MG/DL
FASTING STATUS PATIENT QL REPORTED: NORMAL
GLOBULIN SER CALC-MCNC: 2.6 G/DL (ref 1.9–3.5)
GLUCOSE SERPL-MCNC: 88 MG/DL (ref 65–99)
HBA1C MFR BLD: 5.6 % (ref 4–5.6)
HDLC SERPL-MCNC: 34 MG/DL
LDLC SERPL CALC-MCNC: 131 MG/DL
POTASSIUM SERPL-SCNC: 4 MMOL/L (ref 3.6–5.5)
PROT SERPL-MCNC: 7.1 G/DL (ref 6–8.2)
SODIUM SERPL-SCNC: 138 MMOL/L (ref 135–145)
T4 FREE SERPL-MCNC: 1.32 NG/DL (ref 0.93–1.7)
TRIGL SERPL-MCNC: 104 MG/DL (ref 0–149)
TSH SERPL DL<=0.005 MIU/L-ACNC: 2.01 UIU/ML (ref 0.38–5.33)

## 2022-02-12 PROCEDURE — 36415 COLL VENOUS BLD VENIPUNCTURE: CPT

## 2022-02-12 PROCEDURE — 84439 ASSAY OF FREE THYROXINE: CPT

## 2022-02-12 PROCEDURE — 80061 LIPID PANEL: CPT

## 2022-02-12 PROCEDURE — 82306 VITAMIN D 25 HYDROXY: CPT

## 2022-02-12 PROCEDURE — 84443 ASSAY THYROID STIM HORMONE: CPT

## 2022-02-12 PROCEDURE — 83036 HEMOGLOBIN GLYCOSYLATED A1C: CPT

## 2022-02-12 PROCEDURE — 80053 COMPREHEN METABOLIC PANEL: CPT

## 2022-02-15 ENCOUNTER — TELEMEDICINE (OUTPATIENT)
Dept: MEDICAL GROUP | Facility: CLINIC | Age: 37
End: 2022-02-15
Payer: COMMERCIAL

## 2022-02-15 VITALS — BODY MASS INDEX: 41.77 KG/M2 | HEIGHT: 62 IN | WEIGHT: 227 LBS

## 2022-02-15 DIAGNOSIS — E55.9 VITAMIN D DEFICIENCY: ICD-10-CM

## 2022-02-15 DIAGNOSIS — E78.00 ELEVATED LDL CHOLESTEROL LEVEL: ICD-10-CM

## 2022-02-15 DIAGNOSIS — E28.2 PCOS (POLYCYSTIC OVARIAN SYNDROME): ICD-10-CM

## 2022-02-15 PROCEDURE — 99213 OFFICE O/P EST LOW 20 MIN: CPT | Mod: 95 | Performed by: PHYSICIAN ASSISTANT

## 2022-02-15 RX ORDER — ERGOCALCIFEROL 1.25 MG/1
50000 CAPSULE ORAL
Qty: 15 CAPSULE | Refills: 2 | Status: SHIPPED | OUTPATIENT
Start: 2022-02-15 | End: 2022-10-04

## 2022-02-15 RX ORDER — METFORMIN HYDROCHLORIDE 500 MG/1
1000 TABLET, EXTENDED RELEASE ORAL 2 TIMES DAILY
Qty: 180 TABLET | Refills: 2 | Status: SHIPPED | OUTPATIENT
Start: 2022-02-15 | End: 2022-05-09 | Stop reason: SDUPTHER

## 2022-02-15 ASSESSMENT — FIBROSIS 4 INDEX: FIB4 SCORE: 0.71

## 2022-02-16 NOTE — PROGRESS NOTES
Virtual Visit: Established Patient   This visit was conducted via Zoom using secure and encrypted videoconferencing technology.   The patient was in their home in the state [facility].    The patient's identity was confirmed and verbal consent was obtained for this virtual visit.    Subjective:   CC:   Chief Complaint   Patient presents with   • Results     labs    • Medication Refill     metformin      Jennifer Gee is a 36 y.o. female presenting for evaluation and management of:    Follow up labs.  Patient is being seen to discuss labs.  She denies any other symptoms or issues.  She does acknowledge that her diet has been poor.  She also acknowledges and elevated ldl but is not wanting to start ta statin medication.  She does take metformin for pcos.      ROS   Denies any other symptoms unless previously indicated.     Current medicines (including changes today)  Current Outpatient Medications   Medication Sig Dispense Refill   • metFORMIN ER (GLUCOPHAGE XR) 500 MG TABLET SR 24 HR Take 2 Tablets by mouth 2 times a day. 180 Tablet 2   • vitamin D2, Ergocalciferol, (DRISDOL) 1.25 MG (59476 UT) Cap capsule Take 1 Capsule by mouth every 7 days. 15 Capsule 2   • cyclobenzaprine (FLEXERIL) 10 mg Tab TAKE ONE TABLET BY MOUTH THREE TIMES A DAY AS NEEDED 30 Tablet 2   • ibuprofen (MOTRIN) 800 MG Tab TAKE ONE TABLET BY MOUTH EVERY 8 HOURS AS NEEDED 40 Tablet 2   • omeprazole (PRILOSEC) 20 MG delayed-release capsule TAKE ONE CAPSULE BY MOUTH DAILY 90 Capsule 2   • loratadine (CLARITIN) 10 MG Tab Take 1 Tablet by mouth every day. 30 Tablet 3   • fluticasone (FLONASE) 50 MCG/ACT nasal spray Administer 2 Sprays into affected nostril(S) every day. 16 g 5   • clonazePAM (KLONOPIN) 0.5 MG Tab 1 mg every day. 1mg daily     • lamoTRIgine (LAMICTAL) 100 MG Tab 150 mg     • propranolol (INDERAL) 10 MG Tab      • benzoyl peroxide-erythromycin (BENZAMYCIN) gel Apply to affected area bid 46.6 g 6   • betamethasone dipropionate  (DIPROLENE) 0.05 % Ointment Apply a small amount to affected area twice a day no more than 10 days 50 g 0   • NON SPECIFIED Massage therapy for neuropathy. 1 Each 12   • traZODone (DESYREL) 100 MG Tab TAKE ONE TABLET BY MOUTH EVERY NIGHT AT BEDTIME -GENERIC FOR DESYREL 30 Tab 0   • albuterol 108 (90 Base) MCG/ACT Aero Soln inhalation aerosol Inhale 2 Puffs by mouth every 6 hours as needed for Shortness of Breath. 8.5 g 4   • methylPREDNISolone (MEDROL DOSEPAK) 4 MG Tablet Therapy Pack Follow schedule on package instructions. (Patient not taking: Reported on 2/15/2022) 21 Tablet 0   • promethazine-dextromethorphan (PROMETHAZINE-DM) 6.25-15 MG/5ML syrup Take 5 mL by mouth every four hours as needed for Cough. (Patient not taking: Reported on 2/15/2022) 120 mL 0     No current facility-administered medications for this visit.       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   • Insomnia 09/23/2019   • MELISSA (obstructive sleep apnea) 03/11/2019   • Non-smoker 03/11/2019   • Gastroesophageal reflux disease 03/11/2019   • Nonspecific abnormal electromyogram (EMG) 03/06/2018   • Obesity (BMI 35.0-39.9 without comorbidity) (Formerly [facility]) 03/06/2018   • Mild intermittent asthma without complication 01/22/2018   • Rash of back 10/12/2017   • Acute recurrent sinusitis 09/21/2017   • Bipolar affective disorder (HCC) 02/16/2016   • PCOS (polycystic ovarian syndrome) 02/16/2016   • Acne vulgaris 02/16/2016   • Intractable migraine without aura and without status migrainosus 01/01/2014        Objective:   Ht 1.575 m (5'  "2\")   Wt 103 kg (227 lb)   BMI 41.52 kg/m²     Physical Exam:  Constitutional: Alert, no distress, well-groomed.  Skin: No rashes in visible areas.  Eye: Round. Conjunctiva clear, lids normal. No icterus.   ENMT: Lips pink without lesions, good dentition, moist mucous membranes. Phonation normal.  Neck: No masses, no thyromegaly. Moves freely without pain.  Respiratory: Unlabored respiratory effort, no cough or audible wheeze  Psych: Alert and oriented x3, normal affect and mood.     Assessment and Plan:   The following treatment plan was discussed:     1. PCOS (polycystic ovarian syndrome)  - metFORMIN ER (GLUCOPHAGE XR) 500 MG TABLET SR 24 HR; Take 2 Tablets by mouth 2 times a day.  Dispense: 180 Tablet; Refill: 2  - Lipid Profile; Future  - Comp Metabolic Panel; Future    Will refill metformin.  Sugars are well controlled.  Will recheck labs in 6 months.     2. Elevated LDL cholesterol level    Patient declines statin medication.  Will continue to monitor labs.     3. Vitamin D deficiency  - vitamin D2, Ergocalciferol, (DRISDOL) 1.25 MG (11774 UT) Cap capsule; Take 1 Capsule by mouth every 7 days.  Dispense: 15 Capsule; Refill: 2  - VITAMIN D,25 HYDROXY; Future    Will start patient on a weekly vitamin D supplement.  Repeat labs in 6 months.     Follow-up: Return in about 6 months (around 8/15/2022), or if symptoms worsen or fail to improve.         "

## 2022-02-24 ENCOUNTER — PATIENT MESSAGE (OUTPATIENT)
Dept: MEDICAL GROUP | Facility: CLINIC | Age: 37
End: 2022-02-24
Payer: COMMERCIAL

## 2022-02-24 DIAGNOSIS — L70.0 ACNE VULGARIS: ICD-10-CM

## 2022-02-24 RX ORDER — TETRACYCLINE HYDROCHLORIDE 250 MG/1
250 CAPSULE ORAL 2 TIMES DAILY
Qty: 60 CAPSULE | Refills: 3 | Status: SHIPPED | OUTPATIENT
Start: 2022-02-24 | End: 2022-03-16 | Stop reason: SDUPTHER

## 2022-03-16 ENCOUNTER — TELEMEDICINE (OUTPATIENT)
Dept: MEDICAL GROUP | Facility: CLINIC | Age: 37
End: 2022-03-16
Payer: COMMERCIAL

## 2022-03-16 VITALS — BODY MASS INDEX: 42.33 KG/M2 | HEIGHT: 62 IN | WEIGHT: 230 LBS

## 2022-03-16 DIAGNOSIS — L70.0 ACNE VULGARIS: ICD-10-CM

## 2022-03-16 PROCEDURE — 99213 OFFICE O/P EST LOW 20 MIN: CPT | Mod: 95 | Performed by: PHYSICIAN ASSISTANT

## 2022-03-16 RX ORDER — TETRACYCLINE HYDROCHLORIDE 250 MG/1
250 CAPSULE ORAL 2 TIMES DAILY
Qty: 180 CAPSULE | Refills: 1 | Status: SHIPPED | OUTPATIENT
Start: 2022-03-16 | End: 2022-08-26

## 2022-03-16 RX ORDER — TETRACYCLINE HYDROCHLORIDE 250 MG/1
250 CAPSULE ORAL 2 TIMES DAILY
Qty: 60 CAPSULE | Refills: 3 | Status: SHIPPED | OUTPATIENT
Start: 2022-03-16 | End: 2022-03-16 | Stop reason: SDUPTHER

## 2022-03-16 RX ORDER — PRAZOSIN HYDROCHLORIDE 1 MG/1
CAPSULE ORAL
COMMUNITY
Start: 2022-03-01 | End: 2022-11-03

## 2022-03-16 ASSESSMENT — FIBROSIS 4 INDEX: FIB4 SCORE: 0.71

## 2022-03-16 NOTE — PROGRESS NOTES
Virtual Visit: Established Patient   This visit was conducted via Zoom using secure and encrypted videoconferencing technology.   The patient was in their home in the state of [place].    The patient's identity was confirmed and verbal consent was obtained for this virtual visit.    Subjective:   CC:   Chief Complaint   Patient presents with   • Acne     Fv      Jennifer Gee is a 36 y.o. female presenting for evaluation and management of:    Acne.  Patient states that the tetracycline has worked really well.  She states that it cleared up.  We are here today to follow-up with her acne.  Overall she is doing well with the medication and is pleased.  She would like to continue with medication.  She is aware of sun sensitivity with the medication.  She is planning a trip to [place].  She denies any other symptoms or issues at this time.    ROS   Denies any other symptoms unless previously indicated.    Current medicines (including changes today)  Current Outpatient Medications   Medication Sig Dispense Refill   • prazosin (MINIPRESS) 1 MG Cap      • tetracycline (SUMYCIN) 250 MG Cap Take 1 Capsule by mouth 2 times a day. 180 Capsule 1   • metFORMIN ER (GLUCOPHAGE XR) 500 MG TABLET SR 24 HR Take 2 Tablets by mouth 2 times a day. 180 Tablet 2   • vitamin D2, Ergocalciferol, (DRISDOL) 1.25 MG (57154 UT) Cap capsule Take 1 Capsule by mouth every 7 days. 15 Capsule 2   • cyclobenzaprine (FLEXERIL) 10 mg Tab TAKE ONE TABLET BY MOUTH THREE TIMES A DAY AS NEEDED 30 Tablet 2   • ibuprofen (MOTRIN) 800 MG Tab TAKE ONE TABLET BY MOUTH EVERY 8 HOURS AS NEEDED 40 Tablet 2   • omeprazole (PRILOSEC) 20 MG delayed-release capsule TAKE ONE CAPSULE BY MOUTH DAILY 90 Capsule 2   • loratadine (CLARITIN) 10 MG Tab Take 1 Tablet by mouth every day. 30 Tablet 3   • fluticasone (FLONASE) 50 MCG/ACT nasal spray Administer 2 Sprays into affected nostril(S) every day. 16 g 5   • clonazePAM (KLONOPIN) 0.5 MG Tab 1 mg every day. 1mg daily      • lamoTRIgine (LAMICTAL) 100 MG Tab 150 mg     • benzoyl peroxide-erythromycin (BENZAMYCIN) gel Apply to affected area bid 46.6 g 6   • betamethasone dipropionate (DIPROLENE) 0.05 % Ointment Apply a small amount to affected area twice a day no more than 10 days 50 g 0   • NON SPECIFIED Massage therapy for neuropathy. 1 Each 12   • traZODone (DESYREL) 100 MG Tab TAKE ONE TABLET BY MOUTH EVERY NIGHT AT BEDTIME -GENERIC FOR DESYREL 30 Tab 0   • albuterol 108 (90 Base) MCG/ACT Aero Soln inhalation aerosol Inhale 2 Puffs by mouth every 6 hours as needed for Shortness of Breath. 8.5 g 4   • methylPREDNISolone (MEDROL DOSEPAK) 4 MG Tablet Therapy Pack Follow schedule on package instructions. (Patient not taking: Reported on 2/15/2022) 21 Tablet 0   • promethazine-dextromethorphan (PROMETHAZINE-DM) 6.25-15 MG/5ML syrup Take 5 mL by mouth every four hours as needed for Cough. (Patient not taking: Reported on 2/15/2022) 120 mL 0   • propranolol (INDERAL) 10 MG Tab        No current facility-administered medications for this visit.       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   • Insomnia 09/23/2019   • MELISSA (obstructive sleep apnea) 03/11/2019   • Non-smoker 03/11/2019   • Gastroesophageal reflux disease 03/11/2019   • Nonspecific abnormal electromyogram (EMG) 03/06/2018   • Obesity (BMI 35.0-39.9 without comorbidity) (Formerly [facility]) 03/06/2018   • Mild intermittent asthma without complication 01/22/2018   • Rash of back 10/12/2017   • Acute recurrent sinusitis 09/21/2017   • Bipolar affective disorder (Formerly [facility])  "02/16/2016   • PCOS (polycystic ovarian syndrome) 02/16/2016   • Acne vulgaris 02/16/2016   • Intractable migraine without aura and without status migrainosus 01/01/2014        Objective:   Ht 1.575 m (5' 2\") Comment: stated by pt  Wt 104 kg (230 lb) Comment: stated by pt  BMI 42.07 kg/m²     Physical Exam:  Constitutional: Alert, no distress, well-groomed.  Skin: No rashes in visible areas.  Eye: Round. Conjunctiva clear, lids normal. No icterus.   ENMT: Lips pink without lesions, good dentition, moist mucous membranes. Phonation normal.  Neck: No masses, no thyromegaly. Moves freely without pain.  Respiratory: Unlabored respiratory effort, no cough or audible wheeze  Psych: Alert and oriented x3, normal affect and mood.     Assessment and Plan:   The following treatment plan was discussed:     1. Acne vulgaris  - tetracycline (SUMYCIN) 250 MG Cap; Take 1 Capsule by mouth 2 times a day.  Dispense: 180 Capsule; Refill: 1    Other orders  - prazosin (MINIPRESS) 1 MG Cap    Patient Is doing well with medication.  Will plan to follow up in 3 months, sooner if needed.        Follow-up: Return in about 3 months (around 6/16/2022).         "

## 2022-05-09 DIAGNOSIS — E28.2 PCOS (POLYCYSTIC OVARIAN SYNDROME): ICD-10-CM

## 2022-05-09 RX ORDER — METFORMIN HYDROCHLORIDE 500 MG/1
1000 TABLET, EXTENDED RELEASE ORAL 2 TIMES DAILY
Qty: 180 TABLET | Refills: 1 | Status: SHIPPED
Start: 2022-05-09 | End: 2022-11-03

## 2022-05-09 NOTE — TELEPHONE ENCOUNTER
Pt is requesting 90 day supply     Was the patient seen in the last year in this department? Yes    Does patient have an active prescription for medications requested? Yes    Received Request Via: Patient    Hospital Outpatient Visit on 02/12/2022   Component Date Value   • Sodium 02/12/2022 138    • Potassium 02/12/2022 4.0    • Chloride 02/12/2022 103    • Co2 02/12/2022 23    • Anion Gap 02/12/2022 12.0    • Glucose 02/12/2022 88    • Bun 02/12/2022 12    • Creatinine 02/12/2022 0.57    • Calcium 02/12/2022 9.4    • AST(SGOT) 02/12/2022 21    • ALT(SGPT) 02/12/2022 27    • Alkaline Phosphatase 02/12/2022 76    • Total Bilirubin 02/12/2022 0.2    • Albumin 02/12/2022 4.5    • Total Protein 02/12/2022 7.1    • Globulin 02/12/2022 2.6    • A-G Ratio 02/12/2022 1.7    • 25-Hydroxy   Vitamin D 25 02/12/2022 19 (A)   • Glycohemoglobin 02/12/2022 5.6    • Est Avg Glucose 02/12/2022 114    • Free T-4 02/12/2022 1.32    • TSH 02/12/2022 2.010    • Cholesterol,Tot 02/12/2022 186    • Triglycerides 02/12/2022 104    • HDL 02/12/2022 34 (A)   • LDL 02/12/2022 131 (A)   • Fasting Status 02/12/2022 Fasting    • GFR If  02/12/2022 >60    • GFR If Non  [employer]meri* 02/12/2022 >60    Admission on 09/13/2021, Discharged on 09/13/2021   Component Date Value   • WBC 09/13/2021 4.7 (A)   • RBC 09/13/2021 4.81    • Hemoglobin 09/13/2021 13.8    • Hematocrit 09/13/2021 42.1    • MCV 09/13/2021 87.5    • MCH 09/13/2021 28.7    • MCHC 09/13/2021 32.8 (A)   • RDW 09/13/2021 45.8    • Platelet Count 09/13/2021 206    • MPV 09/13/2021 9.4    • Neutrophils-Polys 09/13/2021 74.90 (A)   • Lymphocytes 09/13/2021 20.40 (A)   • Monocytes 09/13/2021 4.50    • Eosinophils 09/13/2021 0.00    • Basophils 09/13/2021 0.00    • Immature Granulocytes 09/13/2021 0.20    • Nucleated RBC 09/13/2021 0.00    • Neutrophils (Absolute) 09/13/2021 3.48    • Lymphs (Absolute) 09/13/2021 0.95 (A)   • Monos (Absolute) 09/13/2021 0.21    • Eos  (Absolute) 09/13/2021 0.00    • Baso (Absolute) 09/13/2021 0.00    • Immature Granulocytes (a* 09/13/2021 0.01    • NRBC (Absolute) 09/13/2021 0.00    • Sodium 09/13/2021 134 (A)   • Potassium 09/13/2021 3.7    • Chloride 09/13/2021 97    • Co2 09/13/2021 26    • Anion Gap 09/13/2021 11.0    • Glucose 09/13/2021 100 (A)   • Bun 09/13/2021 10    • Creatinine 09/13/2021 0.72    • Calcium 09/13/2021 8.5    • AST(SGOT) 09/13/2021 34    • ALT(SGPT) 09/13/2021 38    • Alkaline Phosphatase 09/13/2021 67    • Total Bilirubin 09/13/2021 0.2    • Albumin 09/13/2021 4.2    • Total Protein 09/13/2021 7.1    • Globulin 09/13/2021 2.9    • A-G Ratio 09/13/2021 1.4    • Color 09/13/2021 Yellow    • Character 09/13/2021 Sl Cloudy (A)   • Specific Gravity 09/13/2021 >=1.030    • Ph 09/13/2021 5.5    • Glucose 09/13/2021 Negative    • Ketones 09/13/2021 Trace (A)   • Protein 09/13/2021 Negative    • Bilirubin 09/13/2021 Negative    • Nitrite 09/13/2021 Negative    • Leukocyte Esterase 09/13/2021 Negative    • Occult Blood 09/13/2021 Negative    • Micro Urine Req 09/13/2021 Microscopic    • GFR If  09/13/2021 >60    • GFR If Non  [employer]meri* 09/13/2021 >60    • WBC 09/13/2021 0-2    • RBC 09/13/2021 0-2    • Bacteria 09/13/2021 Few (A)   • Epithelial Cells 09/13/2021 Few    • Mucous Threads 09/13/2021 Few    • Urine Crystals 09/13/2021 Mod Amorphous    Hospital Outpatient Visit on 09/09/2021   Component Date Value   • COVID Order Status 09/09/2021 Received    • SARS-CoV-2 Source 09/09/2021 Nasal Swab    • SARS-CoV-2 by PCR 09/09/2021 DETECTED (A)   Hospital Outpatient Visit on 08/19/2021   Component Date Value   • SARS-CoV-2 Source 08/19/2021 Nasal Swab    • SARS-CoV-2 by PCR 08/19/2021 NotDetected    • COVID Order Status 08/19/2021 Received    ]

## 2022-05-21 ENCOUNTER — HOSPITAL ENCOUNTER (OUTPATIENT)
Dept: LAB | Facility: MEDICAL CENTER | Age: 37
End: 2022-05-21
Attending: PHYSICIAN ASSISTANT
Payer: COMMERCIAL

## 2022-05-21 DIAGNOSIS — E28.2 PCOS (POLYCYSTIC OVARIAN SYNDROME): ICD-10-CM

## 2022-05-21 DIAGNOSIS — E55.9 VITAMIN D DEFICIENCY: ICD-10-CM

## 2022-05-21 LAB
ALBUMIN SERPL BCP-MCNC: 4.4 G/DL (ref 3.2–4.9)
ALBUMIN/GLOB SERPL: 1.6 G/DL
ALP SERPL-CCNC: 84 U/L (ref 30–99)
ALT SERPL-CCNC: 42 U/L (ref 2–50)
ANION GAP SERPL CALC-SCNC: 9 MMOL/L (ref 7–16)
AST SERPL-CCNC: 26 U/L (ref 12–45)
BILIRUB SERPL-MCNC: 0.4 MG/DL (ref 0.1–1.5)
BUN SERPL-MCNC: 12 MG/DL (ref 8–22)
CALCIUM SERPL-MCNC: 9.4 MG/DL (ref 8.5–10.5)
CHLORIDE SERPL-SCNC: 102 MMOL/L (ref 96–112)
CHOLEST SERPL-MCNC: 180 MG/DL (ref 100–199)
CO2 SERPL-SCNC: 25 MMOL/L (ref 20–33)
CREAT SERPL-MCNC: 0.61 MG/DL (ref 0.5–1.4)
FASTING STATUS PATIENT QL REPORTED: NORMAL
GFR SERPLBLD CREATININE-BSD FMLA CKD-EPI: 118 ML/MIN/1.73 M 2
GLOBULIN SER CALC-MCNC: 2.8 G/DL (ref 1.9–3.5)
GLUCOSE SERPL-MCNC: 94 MG/DL (ref 65–99)
HDLC SERPL-MCNC: 33 MG/DL
LDLC SERPL CALC-MCNC: 116 MG/DL
POTASSIUM SERPL-SCNC: 3.9 MMOL/L (ref 3.6–5.5)
PROT SERPL-MCNC: 7.2 G/DL (ref 6–8.2)
SODIUM SERPL-SCNC: 136 MMOL/L (ref 135–145)
TRIGL SERPL-MCNC: 156 MG/DL (ref 0–149)

## 2022-05-21 PROCEDURE — 80053 COMPREHEN METABOLIC PANEL: CPT

## 2022-05-21 PROCEDURE — 36415 COLL VENOUS BLD VENIPUNCTURE: CPT

## 2022-05-21 PROCEDURE — 82306 VITAMIN D 25 HYDROXY: CPT

## 2022-05-21 PROCEDURE — 80061 LIPID PANEL: CPT

## 2022-05-24 LAB — 25(OH)D3 SERPL-MCNC: 43 NG/ML (ref 30–80)

## 2022-06-01 ENCOUNTER — TELEMEDICINE (OUTPATIENT)
Dept: MEDICAL GROUP | Facility: CLINIC | Age: 37
End: 2022-06-01
Payer: COMMERCIAL

## 2022-06-01 VITALS — WEIGHT: 227 LBS | HEIGHT: 62 IN | BODY MASS INDEX: 41.77 KG/M2

## 2022-06-01 DIAGNOSIS — K92.1 BLOOD IN STOOL: ICD-10-CM

## 2022-06-01 DIAGNOSIS — F43.10 PTSD (POST-TRAUMATIC STRESS DISORDER): ICD-10-CM

## 2022-06-01 DIAGNOSIS — E28.2 PCOS (POLYCYSTIC OVARIAN SYNDROME): ICD-10-CM

## 2022-06-01 DIAGNOSIS — E66.01 MORBID OBESITY WITH BMI OF 40.0-44.9, ADULT (HCC): ICD-10-CM

## 2022-06-01 DIAGNOSIS — E55.9 VITAMIN D DEFICIENCY: ICD-10-CM

## 2022-06-01 DIAGNOSIS — E78.00 ELEVATED LDL CHOLESTEROL LEVEL: ICD-10-CM

## 2022-06-01 DIAGNOSIS — E78.49 OTHER HYPERLIPIDEMIA: ICD-10-CM

## 2022-06-01 PROBLEM — E66.9 OBESITY (BMI 35.0-39.9 WITHOUT COMORBIDITY): Status: RESOLVED | Noted: 2018-03-06 | Resolved: 2022-06-01

## 2022-06-01 PROCEDURE — 99214 OFFICE O/P EST MOD 30 MIN: CPT | Mod: 95 | Performed by: PHYSICIAN ASSISTANT

## 2022-06-01 RX ORDER — LAMOTRIGINE 200 MG/1
TABLET ORAL
COMMUNITY
Start: 2022-05-28 | End: 2023-05-03

## 2022-06-01 RX ORDER — CLONAZEPAM 1 MG/1
TABLET, ORALLY DISINTEGRATING ORAL
COMMUNITY
Start: 2022-05-14

## 2022-06-01 ASSESSMENT — FIBROSIS 4 INDEX: FIB4 SCORE: 0.7

## 2022-06-01 NOTE — PROGRESS NOTES
Virtual Visit: Established Patient   This visit was conducted via Zoom using secure and encrypted videoconferencing technology.   The patient was in their home in the state of [place].    The patient's identity was confirmed and verbal consent was obtained for this virtual visit.    Subjective:   CC:   Chief Complaint   Patient presents with   • Results     Labs    • Medication Refill     Metformin-90 day supply      Jennifer Gee is a 36 y.o. female presenting for evaluation and management of: PCOS    Patient would like to return to the non extended release of the metformin.  She is needing refills at this time.  She states that she felt and did better with the known extended release metformin.    Patient states that she saw pink/red blood in her stool.  She states that she had a colonoscopy in the past that was not covered.  She states she was told she would not need until 2030.  She was diagnosed with gastritis.  She does acknowledge a history of hemorrhoids.  She states that this was after her trip to [place].    Patient was diagnosed with PTSD and medications has been adjusted.  She wanted us to be aware.        ROS   Denies any other symptoms unless previously indicated.      Current medicines (including changes today)  Current Outpatient Medications   Medication Sig Dispense Refill   • metformin (GLUCOPHAGE) 1000 MG tablet Take 1 Tablet by mouth 2 times a day with meals. 180 Tablet 2   • metFORMIN ER (GLUCOPHAGE XR) 500 MG TABLET SR 24 HR Take 2 Tablets by mouth 2 times a day. 180 Tablet 1   • prazosin (MINIPRESS) 1 MG Cap      • tetracycline (SUMYCIN) 250 MG Cap Take 1 Capsule by mouth 2 times a day. 180 Capsule 1   • vitamin D2, Ergocalciferol, (DRISDOL) 1.25 MG (53535 UT) Cap capsule Take 1 Capsule by mouth every 7 days. 15 Capsule 2   • cyclobenzaprine (FLEXERIL) 10 mg Tab TAKE ONE TABLET BY MOUTH THREE TIMES A DAY AS NEEDED 30 Tablet 2   • ibuprofen (MOTRIN) 800 MG Tab TAKE ONE TABLET BY MOUTH EVERY 8  HOURS AS NEEDED 40 Tablet 2   • omeprazole (PRILOSEC) 20 MG delayed-release capsule TAKE ONE CAPSULE BY MOUTH DAILY 90 Capsule 2   • loratadine (CLARITIN) 10 MG Tab Take 1 Tablet by mouth every day. 30 Tablet 3   • fluticasone (FLONASE) 50 MCG/ACT nasal spray Administer 2 Sprays into affected nostril(S) every day. 16 g 5   • clonazePAM (KLONOPIN) 0.5 MG Tab 1 mg every day. 1mg daily     • propranolol (INDERAL) 10 MG Tab      • benzoyl peroxide-erythromycin (BENZAMYCIN) gel Apply to affected area bid 46.6 g 6   • betamethasone dipropionate (DIPROLENE) 0.05 % Ointment Apply a small amount to affected area twice a day no more than 10 days 50 g 0   • NON SPECIFIED Massage therapy for neuropathy. 1 Each 12   • traZODone (DESYREL) 100 MG Tab TAKE ONE TABLET BY MOUTH EVERY NIGHT AT BEDTIME -GENERIC FOR DESYREL 30 Tab 0   • albuterol 108 (90 Base) MCG/ACT Aero Soln inhalation aerosol Inhale 2 Puffs by mouth every 6 hours as needed for Shortness of Breath. 8.5 g 4   • lamotrigine (LAMICTAL) 200 MG tablet      • Clonazepam 1 MG TABLET DISPERSIBLE        No current facility-administered medications for this visit.       Patient Active Problem List    Diagnosis Date Noted   • Blood in stool 06/01/2022   • PTSD (post-traumatic stress disorder) 06/01/2022   • Morbid obesity with BMI of 40.0-44.9, adult (Formerly [facility]) 06/01/2022   • Elevated LDL cholesterol level 02/15/2022   • COVID-19 09/21/2021   • Seasonal allergies 08/19/2021   • Encounter for screening for COVID-19 08/19/2021   • Pressure injury of contiguous region involving back and buttock, stage 2 (Formerly [facility]) 05/20/2021   • Other hyperlipidemia 07/27/2020   • Vitamin D deficiency 07/27/2020   • Sprain of left ankle 05/26/2020   • 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   • Insomnia 09/23/2019   • MELISSA (obstructive sleep apnea) 03/11/2019   • Non-smoker 03/11/2019   • Gastroesophageal reflux disease 03/11/2019   • Nonspecific abnormal electromyogram (EMG) 03/06/2018   •  "Mild intermittent asthma without complication 01/22/2018   • Rash of back 10/12/2017   • Acute recurrent sinusitis 09/21/2017   • Bipolar affective disorder (HCC) 02/16/2016   • PCOS (polycystic ovarian syndrome) 02/16/2016   • Acne vulgaris 02/16/2016   • Intractable migraine without aura and without status migrainosus 01/01/2014        Objective:   Ht 1.575 m (5' 2\")   Wt 103 kg (227 lb)   LMP 05/07/2022 (Exact Date)   BMI 41.52 kg/m²     Physical Exam:  Constitutional: Alert, no distress, well-groomed. obese  Skin: No rashes in visible areas.  Eye: Round. Conjunctiva clear, lids normal. No icterus.   ENMT: Lips pink without lesions, good dentition, moist mucous membranes. Phonation normal.  Neck: No masses, no thyromegaly. Moves freely without pain.  Respiratory: Unlabored respiratory effort, no cough or audible wheeze  Psych: Alert and oriented x3, normal affect and mood.     Assessment and Plan:   The following treatment plan was discussed:     1. PCOS (polycystic ovarian syndrome)  - metformin (GLUCOPHAGE) 1000 MG tablet; Take 1 Tablet by mouth 2 times a day with meals.  Dispense: 180 Tablet; Refill: 2  - Lipid Profile; Future  - Comp Metabolic Panel; Future  - HEMOGLOBIN A1C; Future  2. Morbid obesity with BMI of 40.0-44.9, adult (HCC)  - Patient identified as having weight management issue.  Appropriate orders and counseling given.  3. Elevated LDL cholesterol level  - Lipid Profile; Future  - Comp Metabolic Panel; Future  4. Other hyperlipidemia  5. Vitamin D deficiency      Metformin has been adjusted.  Labs are stable.  Vitamin D has actually improved.  We will repeat labs in 6 months.    6. Blood in stool  - Referral to Gastroenterology    Referral to gastroenterology has been submitted.    7. PTSD (post-traumatic stress disorder)  - lamotrigine (LAMICTAL) 200 MG tablet  - Clonazepam 1 MG TABLET DISPERSIBLE     Note of diagnosis has been added to chart.  Patient will continue to see " psychiatry.      Follow-up: No follow-ups on file.

## 2022-08-16 ENCOUNTER — HOSPITAL ENCOUNTER (OUTPATIENT)
Dept: LAB | Facility: MEDICAL CENTER | Age: 37
End: 2022-08-16
Attending: NURSE PRACTITIONER
Payer: COMMERCIAL

## 2022-08-16 LAB
BASOPHILS # BLD AUTO: 0.3 % (ref 0–1.8)
BASOPHILS # BLD: 0.04 K/UL (ref 0–0.12)
EOSINOPHIL # BLD AUTO: 0.16 K/UL (ref 0–0.51)
EOSINOPHIL NFR BLD: 1.4 % (ref 0–6.9)
ERYTHROCYTE [DISTWIDTH] IN BLOOD BY AUTOMATED COUNT: 42.5 FL (ref 35.9–50)
HCT VFR BLD AUTO: 43.8 % (ref 37–47)
HGB BLD-MCNC: 14.7 G/DL (ref 12–16)
IMM GRANULOCYTES # BLD AUTO: 0.05 K/UL (ref 0–0.11)
IMM GRANULOCYTES NFR BLD AUTO: 0.4 % (ref 0–0.9)
LYMPHOCYTES # BLD AUTO: 2.35 K/UL (ref 1–4.8)
LYMPHOCYTES NFR BLD: 20.1 % (ref 22–41)
MCH RBC QN AUTO: 28.5 PG (ref 27–33)
MCHC RBC AUTO-ENTMCNC: 33.6 G/DL (ref 33.6–35)
MCV RBC AUTO: 85 FL (ref 81.4–97.8)
MONOCYTES # BLD AUTO: 0.54 K/UL (ref 0–0.85)
MONOCYTES NFR BLD AUTO: 4.6 % (ref 0–13.4)
NEUTROPHILS # BLD AUTO: 8.56 K/UL (ref 2–7.15)
NEUTROPHILS NFR BLD: 73.2 % (ref 44–72)
NRBC # BLD AUTO: 0 K/UL
NRBC BLD-RTO: 0 /100 WBC
PLATELET # BLD AUTO: 364 K/UL (ref 164–446)
PMV BLD AUTO: 10.1 FL (ref 9–12.9)
RBC # BLD AUTO: 5.15 M/UL (ref 4.2–5.4)
WBC # BLD AUTO: 11.7 K/UL (ref 4.8–10.8)

## 2022-08-16 PROCEDURE — 82784 ASSAY IGA/IGD/IGG/IGM EACH: CPT

## 2022-08-16 PROCEDURE — 36415 COLL VENOUS BLD VENIPUNCTURE: CPT

## 2022-08-16 PROCEDURE — 85025 COMPLETE CBC W/AUTO DIFF WBC: CPT

## 2022-08-16 PROCEDURE — 86364 TISS TRNSGLTMNASE EA IG CLAS: CPT

## 2022-08-18 ENCOUNTER — HOSPITAL ENCOUNTER (OUTPATIENT)
Facility: MEDICAL CENTER | Age: 37
End: 2022-08-18
Attending: NURSE PRACTITIONER
Payer: COMMERCIAL

## 2022-08-18 LAB
IGA SERPL-MCNC: 166 MG/DL (ref 68–408)
TTG IGA SER IA-ACNC: <2 U/ML (ref 0–3)

## 2022-08-18 PROCEDURE — 87177 OVA AND PARASITES SMEARS: CPT

## 2022-08-18 PROCEDURE — 87209 SMEAR COMPLEX STAIN: CPT

## 2022-08-18 PROCEDURE — 82653 EL-1 FECAL QUANTITATIVE: CPT

## 2022-08-18 PROCEDURE — 87045 FECES CULTURE AEROBIC BACT: CPT

## 2022-08-18 PROCEDURE — 87899 AGENT NOS ASSAY W/OPTIC: CPT | Mod: 91

## 2022-08-19 DIAGNOSIS — S93.402A SPRAIN OF LEFT ANKLE, UNSPECIFIED LIGAMENT, INITIAL ENCOUNTER: ICD-10-CM

## 2022-08-20 LAB
E COLI SXT1+2 STL IA: NORMAL
SIGNIFICANT IND 70042: NORMAL
SITE SITE: NORMAL
SOURCE SOURCE: NORMAL

## 2022-08-21 LAB
BACTERIA STL CULT: NORMAL
C JEJUNI+C COLI AG STL QL: NORMAL
E COLI SXT1+2 STL IA: NORMAL
SIGNIFICANT IND 70042: NORMAL
SITE SITE: NORMAL
SOURCE SOURCE: NORMAL

## 2022-08-22 RX ORDER — IBUPROFEN 800 MG/1
TABLET ORAL
Qty: 40 TABLET | Refills: 0 | Status: SHIPPED | OUTPATIENT
Start: 2022-08-22 | End: 2022-10-04

## 2022-08-22 NOTE — TELEPHONE ENCOUNTER
Received request via: Patient    Was the patient seen in the last year in this department? Yes    Does the patient have an active prescription (recently filled or refills available) for medication(s) requested? No    Last OV: 6/1/22  Last labs: 5/21/22

## 2022-08-25 DIAGNOSIS — L70.0 ACNE VULGARIS: ICD-10-CM

## 2022-08-25 LAB — ELASTASE PANC STL-MCNT: >800 UG/G

## 2022-08-26 ENCOUNTER — HOSPITAL ENCOUNTER (OUTPATIENT)
Dept: RADIOLOGY | Facility: MEDICAL CENTER | Age: 37
End: 2022-08-26
Attending: NURSE PRACTITIONER
Payer: COMMERCIAL

## 2022-08-26 DIAGNOSIS — K21.9 GASTROESOPHAGEAL REFLUX DISEASE, UNSPECIFIED WHETHER ESOPHAGITIS PRESENT: ICD-10-CM

## 2022-08-26 DIAGNOSIS — K92.1 HEMATOCHEZIA: ICD-10-CM

## 2022-08-26 DIAGNOSIS — R11.0 NAUSEA: ICD-10-CM

## 2022-08-26 DIAGNOSIS — K52.9 CHRONIC DIARRHEA OF UNKNOWN ORIGIN: ICD-10-CM

## 2022-08-26 DIAGNOSIS — R10.10 UPPER ABDOMINAL PAIN: ICD-10-CM

## 2022-08-26 PROCEDURE — 76705 ECHO EXAM OF ABDOMEN: CPT

## 2022-08-26 RX ORDER — TETRACYCLINE HYDROCHLORIDE 250 MG/1
CAPSULE ORAL
Qty: 180 CAPSULE | Refills: 1 | Status: SHIPPED | OUTPATIENT
Start: 2022-08-26 | End: 2023-02-18 | Stop reason: SDUPTHER

## 2022-08-26 NOTE — TELEPHONE ENCOUNTER
Was the patient seen in the last year in this department? Yes    Does patient have an active prescription for medications requested? Yes    Received Request Via: Pharmacy    Hospital Outpatient Visit on 08/18/2022   Component Date Value    Significant Indicator 08/18/2022 NEG     Source 08/18/2022 [facility]     Site 08/18/2022 -     Culture Result 08/18/2022                      Value:No enteric pathogens isolated.  NOTE:  Stool cultures are screened for Shiga Toxins 1 and 2,  Salmonella, Shigella, Campylobacter, Aeromonas,  Plesiomonas, and Vibrio.      [facility] 08/18/2022 Negative for Shiga Toxin 1 and 2.     Campylobactor Antigen 08/18/2022                      Value:Negative for Campylobacter Antigen.  Test results are to be used in conjunction with information  available from the patient clinical evaluation and other  diagnostic procedures.      Pancreatic Elastase, Fec* 08/18/2022 >800     Significant Indicator 08/18/2022 NEG     Source 08/18/2022 [facility]     Site 08/18/2022 -     [facility] 08/18/2022 Negative for Shiga Toxin 1 and 2.    Hospital Outpatient Visit on 08/16/2022   Component Date Value    WBC 08/16/2022 11.7 (A)    RBC 08/16/2022 5.15     Hemoglobin 08/16/2022 14.7     Hematocrit 08/16/2022 43.8     MCV 08/16/2022 85.0     MCH 08/16/2022 28.5     MCHC 08/16/2022 33.6     RDW 08/16/2022 42.5     Platelet Count 08/16/2022 364     MPV 08/16/2022 10.1     Neutrophils-Polys 08/16/2022 73.20 (A)    Lymphocytes 08/16/2022 20.10 (A)    Monocytes 08/16/2022 4.60     Eosinophils 08/16/2022 1.40     Basophils 08/16/2022 0.30     Immature Granulocytes 08/16/2022 0.40     Nucleated RBC 08/16/2022 0.00     Neutrophils (Absolute) 08/16/2022 8.56 (A)    Lymphs (Absolute) 08/16/2022 2.35     Monos (Absolute) 08/16/2022 0.54     Eos (Absolute) 08/16/2022 0.16     Baso (Absolute) 08/16/2022 0.04     Immature Granulocytes (a* 08/16/2022 0.05     NRBC (Absolute) 08/16/2022 0.00     t-TG IgA 08/16/2022 <2     Immunoglobulin A 08/16/2022  166    Hospital Outpatient Visit on 05/21/2022   Component Date Value    25-Hydroxy   Vitamin D 25 05/21/2022 43     Sodium 05/21/2022 136     Potassium 05/21/2022 3.9     Chloride 05/21/2022 102     Co2 05/21/2022 25     Anion Gap 05/21/2022 9.0     Glucose 05/21/2022 94     Bun 05/21/2022 12     Creatinine 05/21/2022 0.61     Calcium 05/21/2022 9.4     AST(SGOT) 05/21/2022 26     ALT(SGPT) 05/21/2022 42     Alkaline Phosphatase 05/21/2022 84     Total Bilirubin 05/21/2022 0.4     Albumin 05/21/2022 4.4     Total Protein 05/21/2022 7.2     Globulin 05/21/2022 2.8     A-G Ratio 05/21/2022 1.6     Cholesterol,Tot 05/21/2022 180     Triglycerides 05/21/2022 156 (A)    HDL 05/21/2022 33 (A)    LDL 05/21/2022 116 (A)    Fasting Status 05/21/2022 Fasting     GFR (CKD-EPI) 05/21/2022 118    Hospital Outpatient Visit on 02/12/2022   Component Date Value    Sodium 02/12/2022 138     Potassium 02/12/2022 4.0     Chloride 02/12/2022 103     Co2 02/12/2022 23     Anion Gap 02/12/2022 12.0     Glucose 02/12/2022 88     Bun 02/12/2022 12     Creatinine 02/12/2022 0.57     Calcium 02/12/2022 9.4     AST(SGOT) 02/12/2022 21     ALT(SGPT) 02/12/2022 27     Alkaline Phosphatase 02/12/2022 76     Total Bilirubin 02/12/2022 0.2     Albumin 02/12/2022 4.5     Total Protein 02/12/2022 7.1     Globulin 02/12/2022 2.6     A-G Ratio 02/12/2022 1.7     25-Hydroxy   Vitamin D 25 02/12/2022 19 (A)    Glycohemoglobin 02/12/2022 5.6     Est Avg Glucose 02/12/2022 114     Free T-4 02/12/2022 1.32     TSH 02/12/2022 2.010     Cholesterol,Tot 02/12/2022 186     Triglycerides 02/12/2022 104     HDL 02/12/2022 34 (A)    LDL 02/12/2022 131 (A)    Fasting Status 02/12/2022 Fasting     GFR If African American 02/12/2022 >60     GFR If Non  Ameri* 02/12/2022 >60    Admission on 09/13/2021, Discharged on 09/13/2021   Component Date Value    WBC 09/13/2021 4.7 (A)    RBC 09/13/2021 4.81     Hemoglobin 09/13/2021 13.8     Hematocrit 09/13/2021 42.1      MCV 09/13/2021 87.5     MCH 09/13/2021 28.7     MCHC 09/13/2021 32.8 (A)    RDW 09/13/2021 45.8     Platelet Count 09/13/2021 206     MPV 09/13/2021 9.4     Neutrophils-Polys 09/13/2021 74.90 (A)    Lymphocytes 09/13/2021 20.40 (A)    Monocytes 09/13/2021 4.50     Eosinophils 09/13/2021 0.00     Basophils 09/13/2021 0.00     Immature Granulocytes 09/13/2021 0.20     Nucleated RBC 09/13/2021 0.00     Neutrophils (Absolute) 09/13/2021 3.48     Lymphs (Absolute) 09/13/2021 0.95 (A)    Monos (Absolute) 09/13/2021 0.21     Eos (Absolute) 09/13/2021 0.00     Baso (Absolute) 09/13/2021 0.00     Immature Granulocytes (a* 09/13/2021 0.01     NRBC (Absolute) 09/13/2021 0.00     Sodium 09/13/2021 134 (A)    Potassium 09/13/2021 3.7     Chloride 09/13/2021 97     Co2 09/13/2021 26     Anion Gap 09/13/2021 11.0     Glucose 09/13/2021 100 (A)    Bun 09/13/2021 10     Creatinine 09/13/2021 0.72     Calcium 09/13/2021 8.5     AST(SGOT) 09/13/2021 34     ALT(SGPT) 09/13/2021 38     Alkaline Phosphatase 09/13/2021 67     Total Bilirubin 09/13/2021 0.2     Albumin 09/13/2021 4.2     Total Protein 09/13/2021 7.1     Globulin 09/13/2021 2.9     A-G Ratio 09/13/2021 1.4     Color 09/13/2021 Yellow     Character 09/13/2021 Sl Cloudy (A)    Specific Gravity 09/13/2021 >=1.030     Ph 09/13/2021 5.5     Glucose 09/13/2021 Negative     Ketones 09/13/2021 Trace (A)    Protein 09/13/2021 Negative     Bilirubin 09/13/2021 Negative     Nitrite 09/13/2021 Negative     Leukocyte Esterase 09/13/2021 Negative     Occult Blood 09/13/2021 Negative     Micro Urine Req 09/13/2021 Microscopic     GFR If  09/13/2021 >60     GFR If Non  [employer]meri* 09/13/2021 >60     WBC 09/13/2021 0-2     RBC 09/13/2021 0-2     Bacteria 09/13/2021 Few (A)    Epithelial Cells 09/13/2021 Few     Mucous Threads 09/13/2021 Few     Urine Crystals 09/13/2021 Mod Amorphous    Hospital Outpatient Visit on 09/09/2021   Component Date Value    COVID Order Status  09/09/2021 Received     SARS-CoV-2 Source 09/09/2021 Nasal Swab     SARS-CoV-2 by PCR 09/09/2021 DETECTED (A)   ]

## 2022-08-28 LAB — OVA AND PARASITE, FECAL INTERPRETATION Q0595: NEGATIVE

## 2022-11-03 ENCOUNTER — TELEMEDICINE (OUTPATIENT)
Dept: MEDICAL GROUP | Facility: CLINIC | Age: 37
End: 2022-11-03
Payer: COMMERCIAL

## 2022-11-03 VITALS — BODY MASS INDEX: 38.83 KG/M2 | WEIGHT: 211 LBS | HEIGHT: 62 IN

## 2022-11-03 DIAGNOSIS — J45.20 MILD INTERMITTENT ASTHMA WITHOUT COMPLICATION: ICD-10-CM

## 2022-11-03 DIAGNOSIS — K21.9 GASTROESOPHAGEAL REFLUX DISEASE, UNSPECIFIED WHETHER ESOPHAGITIS PRESENT: ICD-10-CM

## 2022-11-03 DIAGNOSIS — K20.90 ESOPHAGITIS: ICD-10-CM

## 2022-11-03 DIAGNOSIS — F31.62 BIPOLAR DISORDER, CURRENT EPISODE MIXED, MODERATE (HCC): ICD-10-CM

## 2022-11-03 DIAGNOSIS — K64.8 OTHER HEMORRHOIDS: ICD-10-CM

## 2022-11-03 DIAGNOSIS — K57.90 DIVERTICULOSIS: ICD-10-CM

## 2022-11-03 DIAGNOSIS — K44.9 HIATAL HERNIA: ICD-10-CM

## 2022-11-03 PROBLEM — Z11.52 ENCOUNTER FOR SCREENING FOR COVID-19: Status: RESOLVED | Noted: 2021-08-19 | Resolved: 2022-11-03

## 2022-11-03 PROBLEM — U07.1 COVID-19: Status: RESOLVED | Noted: 2021-09-21 | Resolved: 2022-11-03

## 2022-11-03 PROCEDURE — 99214 OFFICE O/P EST MOD 30 MIN: CPT | Mod: 95 | Performed by: PHYSICIAN ASSISTANT

## 2022-11-03 RX ORDER — ESOMEPRAZOLE MAGNESIUM 40 MG/1
40 CAPSULE, DELAYED RELEASE ORAL DAILY
Qty: 90 CAPSULE | Refills: 0 | Status: SHIPPED | OUTPATIENT
Start: 2022-11-03 | End: 2022-12-07 | Stop reason: SDUPTHER

## 2022-11-03 RX ORDER — ZIPRASIDONE HYDROCHLORIDE 40 MG/1
CAPSULE ORAL
COMMUNITY
Start: 2022-10-13 | End: 2023-05-03

## 2022-11-03 RX ORDER — ESOMEPRAZOLE MAGNESIUM 40 MG/1
CAPSULE, DELAYED RELEASE ORAL
COMMUNITY
Start: 2022-10-10 | End: 2022-11-03 | Stop reason: SDUPTHER

## 2022-11-03 RX ORDER — ALBUTEROL SULFATE 90 UG/1
2 AEROSOL, METERED RESPIRATORY (INHALATION) EVERY 6 HOURS PRN
Qty: 18 G | Refills: 4 | Status: SHIPPED | OUTPATIENT
Start: 2022-11-03

## 2022-11-03 ASSESSMENT — FIBROSIS 4 INDEX: FIB4 SCORE: 0.41

## 2022-11-04 NOTE — PROGRESS NOTES
Virtual Visit: Established Patient   This visit was conducted via Zoom using secure and encrypted videoconferencing technology.   The patient was in their home in the [facility].    The patient's identity was confirmed and verbal consent was obtained for this virtual visit.    Subjective:   CC:   Chief Complaint   Patient presents with    Follow-Up     Pt is following up after GI appt,      Jennifer Gee is a 37 y.o. female presenting for evaluation and management of:    Gerd. Patient has been to see GI.   Patient did have further testing.  She was told that she had a fatty liver.  She did have esophagitis, diverticulosis, hemorrhoids and a hiatal hernia.  She was switched to nexium and is doing better.      Patient is having some concerns with her psychiatric medications.  She does find that she has been more manic since that she has been on new doses of Geodon.  She does have an appointment coming up with to see her psychiatrist on the ninth.    ROS   Denies any other symptoms unless previously indicated.     Current medicines (including changes today)  Current Outpatient Medications   Medication Sig Dispense Refill    ziprasidone (GEODON) 40 MG Cap       esomeprazole (NEXIUM) 40 MG delayed-release capsule Take 1 Capsule by mouth every day. 90 Capsule 0    albuterol 108 (90 Base) MCG/ACT Aero Soln inhalation aerosol Inhale 2 Puffs every 6 hours as needed for Shortness of Breath. 18 g 4    vitamin D2, Ergocalciferol, (DRISDOL) 1.25 MG (44132 UT) Cap capsule TAKE ONE CAPSULE BY MOUTH ONCE WEEKLY 12 Capsule 2    ibuprofen (MOTRIN) 800 MG Tab TAKE ONE TABLET BY MOUTH EVERY 8 HOURS AS NEEDED 40 Tablet 2    tetracycline (SUMYCIN) 250 MG Cap TAKE ONE CAPSULE BY MOUTH TWICE A  Capsule 1    lamotrigine (LAMICTAL) 200 MG tablet       Clonazepam 1 MG TABLET DISPERSIBLE       metformin (GLUCOPHAGE) 1000 MG tablet Take 1 Tablet by mouth 2 times a day with meals. 180 Tablet 2    cyclobenzaprine (FLEXERIL) 10 mg  "Tab TAKE ONE TABLET BY MOUTH THREE TIMES A DAY AS NEEDED 30 Tablet 2    loratadine (CLARITIN) 10 MG Tab Take 1 Tablet by mouth every day. 30 Tablet 3    prazosin (MINIPRESS) 1 MG Cap       fluticasone (FLONASE) 50 MCG/ACT nasal spray Administer 2 Sprays into affected nostril(S) every day. (Patient not taking: Reported on 11/3/2022) 16 g 5    NON SPECIFIED Massage therapy for neuropathy. 1 Each 12     No current facility-administered medications for this visit.       Patient Active Problem List    Diagnosis Date Noted    Esophagitis 11/03/2022    Diverticulosis 11/03/2022    Hiatal hernia 11/03/2022    Other hemorrhoids 11/03/2022    Blood in stool 06/01/2022    PTSD (post-traumatic stress disorder) 06/01/2022    Morbid obesity with BMI of 40.0-44.9, adult ([facility]) 06/01/2022    Elevated LDL cholesterol level 02/15/2022    Seasonal allergies 08/19/2021    Pressure injury of contiguous region involving back and buttock, stage 2 ([facility]) 05/20/2021    Other hyperlipidemia 07/27/2020    Vitamin D deficiency 07/27/2020    Sprain of left ankle 05/26/2020    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59 kg/m²     Physical Exam:  Constitutional: Alert, no distress, well-groomed.  Skin: No rashes in visible areas.  Eye: Round. Conjunctiva clear, lids normal. No icterus.   ENMT: Lips pink without lesions, good dentition, moist mucous " membranes. Phonation normal.  Neck: No masses, no thyromegaly. Moves freely without pain.  Respiratory: Unlabored respiratory effort, no cough or audible wheeze  Psych: Alert and oriented x3, normal affect and mood.     Assessment and Plan:   The following treatment plan was discussed:     1. Gastroesophageal reflux disease, unspecified whether esophagitis present  - esomeprazole (NEXIUM) 40 MG delayed-release capsule; Take 1 Capsule by mouth every day.  Dispense: 90 Capsule; Refill: 0  2. Esophagitis  - esomeprazole (NEXIUM) 40 MG delayed-release capsule; Take 1 Capsule by mouth every day.  Dispense: 90 Capsule; Refill: 0  3. Hiatal hernia  - esomeprazole (NEXIUM) 40 MG delayed-release capsule; Take 1 Capsule by mouth every day.  Dispense: 90 Capsule; Refill: 0  4. Diverticulosis  5. Other hemorrhoids    Medication refilled until patient can be seen by GI.  We will follow their recommendations.    6. Mild intermittent asthma without complication  - albuterol 108 (90 Base) MCG/ACT Aero Soln inhalation aerosol; Inhale 2 Puffs every 6 hours as needed for Shortness of Breath.  Dispense: 18 g; Refill: 4    Inhaler refilled at this time.    7. Bipolar disorder, current episode mixed, moderate (HCC)  Other orders  - ziprasidone (GEODON) 40 MG Cap    It is possible that the new medication is affecting patient.  Recommend patient have a consult with psychiatry which is coming up on the ninth.    This is a 30-minute appointment with greater than 50% spent in education and counseling regarding esophageal reflux, labs, and medications and bipolar medication use.    Follow-up: No follow-ups on file.

## 2023-01-22 DIAGNOSIS — S93.402A SPRAIN OF LEFT ANKLE, UNSPECIFIED LIGAMENT, INITIAL ENCOUNTER: ICD-10-CM

## 2023-01-22 DIAGNOSIS — G60.0 CHARCOT-MARIE-TOOTH DISEASE OF NEURONAL TYPE: ICD-10-CM

## 2023-01-23 NOTE — TELEPHONE ENCOUNTER
Received request via: Patient    Was the patient seen in the last year in this department? Yes    Does the patient have an active prescription (recently filled or refills available) for medication(s) requested? No    Does the patient have alf Plus and need 100 day supply (blood pressure, diabetes and cholesterol meds only)? Patient does not have SCP

## 2023-01-25 RX ORDER — CYCLOBENZAPRINE HCL 10 MG
10 TABLET ORAL 3 TIMES DAILY PRN
Qty: 30 TABLET | Refills: 2 | Status: SHIPPED | OUTPATIENT
Start: 2023-01-25 | End: 2023-05-02 | Stop reason: SDUPTHER

## 2023-01-25 RX ORDER — IBUPROFEN 800 MG/1
800 TABLET ORAL EVERY 8 HOURS PRN
Qty: 40 TABLET | Refills: 3 | Status: SHIPPED | OUTPATIENT
Start: 2023-01-25 | End: 2023-05-02 | Stop reason: SDUPTHER

## 2023-02-18 DIAGNOSIS — L70.0 ACNE VULGARIS: ICD-10-CM

## 2023-02-18 DIAGNOSIS — E28.2 PCOS (POLYCYSTIC OVARIAN SYNDROME): ICD-10-CM

## 2023-02-21 NOTE — TELEPHONE ENCOUNTER
Received request via: Pharmacy    Was the patient seen in the last year in this department? Yes    Does the patient have an active prescription (recently filled or refills available) for medication(s) requested? No    Does the patient have long term Plus and need 100 day supply (blood pressure, diabetes and cholesterol meds only)? Patient does not have SCP    Last OV: 11/3/22  Last labs: 8/18/22

## 2023-02-22 RX ORDER — TETRACYCLINE HYDROCHLORIDE 250 MG/1
250 CAPSULE ORAL 2 TIMES DAILY
Qty: 180 CAPSULE | Refills: 0 | Status: SHIPPED | OUTPATIENT
Start: 2023-02-22 | End: 2023-05-02 | Stop reason: SDUPTHER

## 2023-03-30 DIAGNOSIS — K20.90 ESOPHAGITIS: ICD-10-CM

## 2023-03-30 DIAGNOSIS — K21.9 GASTROESOPHAGEAL REFLUX DISEASE, UNSPECIFIED WHETHER ESOPHAGITIS PRESENT: ICD-10-CM

## 2023-03-30 DIAGNOSIS — K44.9 HIATAL HERNIA: ICD-10-CM

## 2023-03-30 RX ORDER — ESOMEPRAZOLE MAGNESIUM 40 MG/1
40 CAPSULE, DELAYED RELEASE ORAL DAILY
Qty: 90 CAPSULE | Refills: 0 | Status: SHIPPED | OUTPATIENT
Start: 2023-03-30 | End: 2023-03-30 | Stop reason: SDUPTHER

## 2023-03-30 RX ORDER — ESOMEPRAZOLE MAGNESIUM 40 MG/1
40 CAPSULE, DELAYED RELEASE ORAL DAILY
Qty: 90 CAPSULE | Refills: 0 | Status: SHIPPED | OUTPATIENT
Start: 2023-03-30 | End: 2023-05-02 | Stop reason: SDUPTHER

## 2023-03-30 NOTE — TELEPHONE ENCOUNTER
Was the patient seen in the last year in this department? Yes    Does patient have an active prescription for medications requested? Yes    Received Request Via: Pharmacy    Hospital Outpatient Visit on 08/18/2022   Component Date Value    Significant Indicator 08/18/2022 NEG     Source 08/18/2022 [facility]     Site 08/18/2022 -     Culture Result 08/18/2022                      Value:No enteric pathogens isolated.  NOTE:  Stool cultures are screened for Shiga Toxins 1 and 2,  Salmonella, Shigella, Campylobacter, Aeromonas,  Plesiomonas, and Vibrio.      [facility] 08/18/2022 Negative for Shiga Toxin 1 and 2.     Campylobactor Antigen 08/18/2022                      Value:Negative for Campylobacter Antigen.  Test results are to be used in conjunction with information  available from the patient clinical evaluation and other  diagnostic procedures.      Pancreatic Elastase, Fec* 08/18/2022 >800     Ova and Parasite, Fecal * 08/18/2022 Negative     Significant Indicator 08/18/2022 NEG     Source 08/18/2022 [facility]     Site 08/18/2022 -     [facility] 08/18/2022 Negative for Shiga Toxin 1 and 2.    Hospital Outpatient Visit on 08/16/2022   Component Date Value    WBC 08/16/2022 11.7 (H)     RBC 08/16/2022 5.15     Hemoglobin 08/16/2022 14.7     Hematocrit 08/16/2022 43.8     MCV 08/16/2022 85.0     MCH 08/16/2022 28.5     MCHC 08/16/2022 33.6     RDW 08/16/2022 42.5     Platelet Count 08/16/2022 364     MPV 08/16/2022 10.1     Neutrophils-Polys 08/16/2022 73.20 (H)     Lymphocytes 08/16/2022 20.10 (L)     Monocytes 08/16/2022 4.60     Eosinophils 08/16/2022 1.40     Basophils 08/16/2022 0.30     Immature Granulocytes 08/16/2022 0.40     Nucleated RBC 08/16/2022 0.00     Neutrophils (Absolute) 08/16/2022 8.56 (H)     Lymphs (Absolute) 08/16/2022 2.35     Monos (Absolute) 08/16/2022 0.54     Eos (Absolute) 08/16/2022 0.16     Baso (Absolute) 08/16/2022 0.04     Immature Granulocytes (a* 08/16/2022 0.05     NRBC (Absolute) 08/16/2022 0.00      t-TG IgA 08/16/2022 <2     Immunoglobulin A 08/16/2022 166    Hospital Outpatient Visit on 05/21/2022   Component Date Value    25-Hydroxy   Vitamin D 25 05/21/2022 43     Sodium 05/21/2022 136     Potassium 05/21/2022 3.9     Chloride 05/21/2022 102     Co2 05/21/2022 25     Anion Gap 05/21/2022 9.0     Glucose 05/21/2022 94     Bun 05/21/2022 12     Creatinine 05/21/2022 0.61     Calcium 05/21/2022 9.4     AST(SGOT) 05/21/2022 26     ALT(SGPT) 05/21/2022 42     Alkaline Phosphatase 05/21/2022 84     Total Bilirubin 05/21/2022 0.4     Albumin 05/21/2022 4.4     Total Protein 05/21/2022 7.2     Globulin 05/21/2022 2.8     A-G Ratio 05/21/2022 1.6     Cholesterol,Tot 05/21/2022 180     Triglycerides 05/21/2022 156 (H)     HDL 05/21/2022 33 (A)     LDL 05/21/2022 116 (H)     Fasting Status 05/21/2022 Fasting     GFR (CKD-EPI) 05/21/2022 118    ]

## 2023-05-03 ENCOUNTER — TELEMEDICINE (OUTPATIENT)
Dept: MEDICAL GROUP | Facility: CLINIC | Age: 38
End: 2023-05-03
Payer: COMMERCIAL

## 2023-05-03 VITALS — BODY MASS INDEX: 38.64 KG/M2 | WEIGHT: 210 LBS | RESPIRATION RATE: 16 BRPM | HEIGHT: 62 IN

## 2023-05-03 DIAGNOSIS — E66.01 MORBID OBESITY (HCC): ICD-10-CM

## 2023-05-03 DIAGNOSIS — E55.9 VITAMIN D DEFICIENCY: ICD-10-CM

## 2023-05-03 DIAGNOSIS — L70.0 ACNE VULGARIS: ICD-10-CM

## 2023-05-03 DIAGNOSIS — G47.00 INSOMNIA, UNSPECIFIED TYPE: ICD-10-CM

## 2023-05-03 DIAGNOSIS — E66.01 MORBID OBESITY WITH BMI OF 40.0-44.9, ADULT (HCC): ICD-10-CM

## 2023-05-03 DIAGNOSIS — F31.62 BIPOLAR DISORDER, CURRENT EPISODE MIXED, MODERATE (HCC): ICD-10-CM

## 2023-05-03 DIAGNOSIS — R10.13 EPIGASTRIC PAIN: ICD-10-CM

## 2023-05-03 DIAGNOSIS — E78.49 OTHER HYPERLIPIDEMIA: ICD-10-CM

## 2023-05-03 DIAGNOSIS — E28.2 PCOS (POLYCYSTIC OVARIAN SYNDROME): ICD-10-CM

## 2023-05-03 DIAGNOSIS — E78.00 ELEVATED LDL CHOLESTEROL LEVEL: ICD-10-CM

## 2023-05-03 DIAGNOSIS — G60.0 CHARCOT-MARIE-TOOTH DISEASE OF NEURONAL TYPE: ICD-10-CM

## 2023-05-03 DIAGNOSIS — K21.9 GASTROESOPHAGEAL REFLUX DISEASE WITHOUT ESOPHAGITIS: ICD-10-CM

## 2023-05-03 PROCEDURE — 99214 OFFICE O/P EST MOD 30 MIN: CPT | Mod: 95 | Performed by: PHYSICIAN ASSISTANT

## 2023-05-03 RX ORDER — TRAZODONE HYDROCHLORIDE 100 MG/1
100 TABLET ORAL
COMMUNITY
Start: 2023-03-29 | End: 2023-05-03 | Stop reason: SDUPTHER

## 2023-05-03 RX ORDER — LAMOTRIGINE 100 MG/1
100 TABLET ORAL DAILY
Qty: 90 TABLET | Refills: 0 | Status: SHIPPED | OUTPATIENT
Start: 2023-05-03 | End: 2023-05-24

## 2023-05-03 RX ORDER — TRAZODONE HYDROCHLORIDE 100 MG/1
100 TABLET ORAL
Qty: 90 TABLET | Refills: 0 | Status: SHIPPED | OUTPATIENT
Start: 2023-05-03 | End: 2023-08-04

## 2023-05-03 RX ORDER — LAMOTRIGINE 150 MG/1
150 TABLET ORAL DAILY
COMMUNITY
Start: 2023-03-29 | End: 2023-05-03

## 2023-05-03 RX ORDER — PANTOPRAZOLE SODIUM 40 MG/1
40 TABLET, DELAYED RELEASE ORAL DAILY
Qty: 90 TABLET | Refills: 1 | Status: SHIPPED | OUTPATIENT
Start: 2023-05-03 | End: 2023-09-05

## 2023-05-03 ASSESSMENT — FIBROSIS 4 INDEX: FIB4 SCORE: 0.41

## 2023-05-03 NOTE — PROGRESS NOTES
Virtual Visit: Established Patient   This visit was conducted via Zoom using secure and encrypted videoconferencing technology.   The patient was in their home in the state of [place].    The patient's identity was confirmed and verbal consent was obtained for this virtual visit.    Subjective:   CC:   Chief Complaint   Patient presents with    Medication Refill     Requesting to discuss med refills for psych and new referral     Jennifer Gee is a 37 y.o. female presenting for evaluation and management of:    Bho referral.  Patient states that she is out of her psych medication.  She is not able to be seen until the end of the month.  She  states that her provider was not helpful.  She is establish with a new provider on June 13th.  She is taking 100 mg of lamotrigine daily for bipolar and 100 mg trazodone at night for insomnia.     Patient is needing labs.  She is obese with hyperlipidemia and elevated ldl and also has vitamin D deficiency.      She is needing refills of medication for acid reflux, acne, PCOS and ibuprofen for charcot-candy-tooth.   Patient would like to try pantoprazole for her acid reflux.     She is having abdominal pain that is epigastric.  The pain goes to the back.  She states that her stomach is firm.  She states that she woke up with the pain.     ROS   Denies any other symptoms unless previously indicated.    Current medicines (including changes today)  Current Outpatient Medications   Medication Sig Dispense Refill    lamoTRIgine (LAMICTAL) 100 MG Tab Take 1 Tablet by mouth every day. 90 Tablet 0    traZODone (DESYREL) 100 MG Tab Take 1 Tablet by mouth at bedtime. 90 Tablet 0    pantoprazole (PROTONIX) 40 MG Tablet Delayed Response Take 1 Tablet by mouth every day. 90 Tablet 1    albuterol 108 (90 Base) MCG/ACT Aero Soln inhalation aerosol Inhale 2 Puffs every 6 hours as needed for Shortness of Breath. 18 g 4    vitamin D2, Ergocalciferol, (DRISDOL) 1.25 MG (31989 UT) Cap capsule  TAKE ONE CAPSULE BY MOUTH ONCE WEEKLY 12 Capsule 2    Clonazepam 1 MG TABLET DISPERSIBLE       loratadine (CLARITIN) 10 MG Tab Take 1 Tablet by mouth every day. 30 Tablet 3    fluticasone (FLONASE) 50 MCG/ACT nasal spray Administer 2 Sprays into affected nostril(S) every day. 16 g 5    cyclobenzaprine (FLEXERIL) 10 mg Tab Take 1 Tablet by mouth 3 times a day as needed for Moderate Pain. 90 Tablet 1    ibuprofen (MOTRIN) 800 MG Tab Take 1 Tablet by mouth every 8 hours as needed for Moderate Pain. 90 Tablet 1    metformin (GLUCOPHAGE) 1000 MG tablet Take 1 Tablet by mouth 2 times a day with meals. 180 Tablet 1    tetracycline (SUMYCIN) 250 MG Cap Take 1 Capsule by mouth 2 times a day. 180 Capsule 1    esomeprazole (NEXIUM) 40 MG delayed-release capsule Take 1 Capsule by mouth every day. 90 Capsule 1    NON SPECIFIED Massage therapy for neuropathy. 1 Each 12     No current facility-administered medications for this visit.       Patient Active Problem List    Diagnosis Date Noted    Epigastric pain 05/03/2023    Esophagitis 11/03/2022    Diverticulosis 11/03/2022    Hiatal hernia 11/03/2022    Other hemorrhoids 11/03/2022    Blood in stool 06/01/2022    PTSD (post-traumatic stress disorder) 06/01/2022    Morbid obesity with BMI of 40.0-44.9, adult ([facility]) 06/01/2022    Elevated LDL cholesterol level 02/15/2022    Seasonal allergies 08/19/2021    Pressure injury of contiguous region involving back and buttock, stage 2 ([facility]) 05/20/2021    Other hyperlipidemia 07/27/2020    Vitamin D deficiency 07/27/2020    Sprain of left ankle 05/26/2020    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    Insomnia 09/23/2019    MELISSA (obstructive sleep apnea) 03/11/2019    Non-smoker 03/11/2019    Gastroesophageal reflux disease 03/11/2019    Nonspecific abnormal electromyogram (EMG) 03/06/2018    Mild intermittent asthma without complication 01/22/2018    Rash of back 10/12/2017    Acute recurrent sinusitis 09/21/2017    Bipolar  "affective disorder (HCC) 02/16/2016    PCOS (polycystic ovarian syndrome) 02/16/2016    Acne vulgaris 02/16/2016    Intractable migraine without aura and without status migrainosus 01/01/2014        Objective:   Resp 16   Ht 1.575 m (5' 2\") Comment: Pt stated  Wt 95.3 kg (210 lb) Comment: Pt stated  LMP 04/26/2023 (Within Days)   BMI 38.41 kg/m²     Physical Exam:  Constitutional: Alert, no distress, well-groomed.  Skin: No rashes in visible areas.  Eye: Round. Conjunctiva clear, lids normal. No icterus.   ENMT: Lips pink without lesions, good dentition, moist mucous membranes. Phonation normal.  Neck: No masses, no thyromegaly. Moves freely without pain.  Respiratory: Unlabored respiratory effort, no cough or audible wheeze  Psych: Alert and oriented x3, normal affect and mood.     Assessment and Plan:   The following treatment plan was discussed:     1. Bipolar disorder, current episode mixed, moderate ([facility])  - lamoTRIgine (LAMICTAL) 100 MG Tab; Take 1 Tablet by mouth every day.  Dispense: 90 Tablet; Refill: 0  2. Insomnia, unspecified type  - traZODone (DESYREL) 100 MG Tab; Take 1 Tablet by mouth at bedtime.  Dispense: 90 Tablet; Refill: 0    I have refilled medications at this time until patient can establish with new behavioral health specialist.    3. Other hyperlipidemia  - Lipid Profile; Future  - Comp Metabolic Panel; Future  4. Vitamin D deficiency  - VITAMIN D,25 HYDROXY (DEFICIENCY); Future  5. Elevated LDL cholesterol level  - Lipid Profile; Future  - Comp Metabolic Panel; Future  6. Morbid obesity with BMI of 40.0-44.9, adult ([facility])  - HEMOGLOBIN A1C; Future  - TSH WITH REFLEX TO FT4; Future  7. PCOS (polycystic ovarian syndrome)  8. Acne vulgaris  9. Charcot-Michelle-Tooth disease of neuronal type  10. Gastroesophageal reflux disease without esophagitis  - pantoprazole (PROTONIX) 40 MG Tablet Delayed Response; Take 1 Tablet by mouth every day.  Dispense: 90 Tablet; Refill: 1    Labs have been ordered " to evaluate further.  Medications refilled.  Patient had sent in separate refill request and so those were refilled at time of visit.    11. Epigastric pain  - AMYLASE; Future  - LIPASE; Future  - URINALYSIS,CULTURE IF INDICATED; Future  - URINE CULTURE(NEW); Future  - US-RUQ; Future    ER precautions given.  In the meantime we will obtain labs and will have patient follow-up with test results.  We will also order an ultrasound to evaluate gallbladder.  Follow-up: No follow-ups on file.

## 2023-05-04 ENCOUNTER — HOSPITAL ENCOUNTER (OUTPATIENT)
Dept: LAB | Facility: MEDICAL CENTER | Age: 38
End: 2023-05-04
Attending: PHYSICIAN ASSISTANT
Payer: COMMERCIAL

## 2023-05-04 DIAGNOSIS — E55.9 VITAMIN D DEFICIENCY: ICD-10-CM

## 2023-05-04 DIAGNOSIS — R10.13 EPIGASTRIC PAIN: ICD-10-CM

## 2023-05-04 DIAGNOSIS — E78.00 ELEVATED LDL CHOLESTEROL LEVEL: ICD-10-CM

## 2023-05-04 DIAGNOSIS — E78.49 OTHER HYPERLIPIDEMIA: ICD-10-CM

## 2023-05-04 DIAGNOSIS — E66.01 MORBID OBESITY WITH BMI OF 40.0-44.9, ADULT (HCC): ICD-10-CM

## 2023-05-04 LAB
25(OH)D3 SERPL-MCNC: 61 NG/ML (ref 30–100)
APPEARANCE UR: CLEAR
BILIRUB UR QL STRIP.AUTO: NEGATIVE
COLOR UR: YELLOW
EST. AVERAGE GLUCOSE BLD GHB EST-MCNC: 105 MG/DL
GLUCOSE UR STRIP.AUTO-MCNC: NEGATIVE MG/DL
HBA1C MFR BLD: 5.3 % (ref 4–5.6)
KETONES UR STRIP.AUTO-MCNC: NEGATIVE MG/DL
LEUKOCYTE ESTERASE UR QL STRIP.AUTO: NEGATIVE
MICRO URNS: NORMAL
NITRITE UR QL STRIP.AUTO: NEGATIVE
PH UR STRIP.AUTO: 6.5 [PH] (ref 5–8)
PROT UR QL STRIP: NEGATIVE MG/DL
RBC UR QL AUTO: NEGATIVE
SP GR UR STRIP.AUTO: 1
TSH SERPL DL<=0.005 MIU/L-ACNC: 2.9 UIU/ML (ref 0.38–5.33)
UROBILINOGEN UR STRIP.AUTO-MCNC: 0.2 MG/DL

## 2023-05-04 PROCEDURE — 82150 ASSAY OF AMYLASE: CPT

## 2023-05-04 PROCEDURE — 84443 ASSAY THYROID STIM HORMONE: CPT

## 2023-05-04 PROCEDURE — 36415 COLL VENOUS BLD VENIPUNCTURE: CPT

## 2023-05-04 PROCEDURE — 83690 ASSAY OF LIPASE: CPT

## 2023-05-04 PROCEDURE — 80061 LIPID PANEL: CPT

## 2023-05-04 PROCEDURE — 81003 URINALYSIS AUTO W/O SCOPE: CPT

## 2023-05-04 PROCEDURE — 80053 COMPREHEN METABOLIC PANEL: CPT

## 2023-05-04 PROCEDURE — 82306 VITAMIN D 25 HYDROXY: CPT

## 2023-05-04 PROCEDURE — 87086 URINE CULTURE/COLONY COUNT: CPT

## 2023-05-04 PROCEDURE — 83036 HEMOGLOBIN GLYCOSYLATED A1C: CPT

## 2023-05-05 LAB
ALBUMIN SERPL BCP-MCNC: 4.7 G/DL (ref 3.2–4.9)
ALBUMIN/GLOB SERPL: 1.8 G/DL
ALP SERPL-CCNC: 76 U/L (ref 30–99)
ALT SERPL-CCNC: 34 U/L (ref 2–50)
AMYLASE SERPL-CCNC: 34 U/L (ref 20–103)
ANION GAP SERPL CALC-SCNC: 17 MMOL/L (ref 7–16)
AST SERPL-CCNC: 20 U/L (ref 12–45)
BILIRUB SERPL-MCNC: 0.4 MG/DL (ref 0.1–1.5)
BUN SERPL-MCNC: 12 MG/DL (ref 8–22)
CALCIUM ALBUM COR SERPL-MCNC: 8.8 MG/DL (ref 8.5–10.5)
CALCIUM SERPL-MCNC: 9.4 MG/DL (ref 8.5–10.5)
CHLORIDE SERPL-SCNC: 100 MMOL/L (ref 96–112)
CHOLEST SERPL-MCNC: 201 MG/DL (ref 100–199)
CO2 SERPL-SCNC: 22 MMOL/L (ref 20–33)
CREAT SERPL-MCNC: 0.62 MG/DL (ref 0.5–1.4)
FASTING STATUS PATIENT QL REPORTED: NORMAL
GFR SERPLBLD CREATININE-BSD FMLA CKD-EPI: 117 ML/MIN/1.73 M 2
GLOBULIN SER CALC-MCNC: 2.6 G/DL (ref 1.9–3.5)
GLUCOSE SERPL-MCNC: 76 MG/DL (ref 65–99)
HDLC SERPL-MCNC: 31 MG/DL
LDLC SERPL CALC-MCNC: 133 MG/DL
LIPASE SERPL-CCNC: 23 U/L (ref 11–82)
POTASSIUM SERPL-SCNC: 4 MMOL/L (ref 3.6–5.5)
PROT SERPL-MCNC: 7.3 G/DL (ref 6–8.2)
SODIUM SERPL-SCNC: 139 MMOL/L (ref 135–145)
TRIGL SERPL-MCNC: 183 MG/DL (ref 0–149)

## 2023-05-06 LAB
BACTERIA UR CULT: NORMAL
SIGNIFICANT IND 70042: NORMAL
SITE SITE: NORMAL
SOURCE SOURCE: NORMAL

## 2023-05-19 ENCOUNTER — HOSPITAL ENCOUNTER (OUTPATIENT)
Dept: RADIOLOGY | Facility: MEDICAL CENTER | Age: 38
End: 2023-05-19
Attending: PHYSICIAN ASSISTANT
Payer: COMMERCIAL

## 2023-05-19 DIAGNOSIS — R10.13 EPIGASTRIC PAIN: ICD-10-CM

## 2023-05-19 PROCEDURE — 76705 ECHO EXAM OF ABDOMEN: CPT

## 2023-05-23 ENCOUNTER — PATIENT MESSAGE (OUTPATIENT)
Dept: MEDICAL GROUP | Facility: CLINIC | Age: 38
End: 2023-05-23
Payer: COMMERCIAL

## 2023-05-23 DIAGNOSIS — G60.0 CHARCOT-MARIE-TOOTH DISEASE OF NEURONAL TYPE: ICD-10-CM

## 2023-05-24 ENCOUNTER — OFFICE VISIT (OUTPATIENT)
Dept: MEDICAL GROUP | Facility: CLINIC | Age: 38
End: 2023-05-24
Payer: COMMERCIAL

## 2023-05-24 VITALS
SYSTOLIC BLOOD PRESSURE: 112 MMHG | RESPIRATION RATE: 16 BRPM | HEART RATE: 73 BPM | TEMPERATURE: 98.1 F | BODY MASS INDEX: 38.02 KG/M2 | HEIGHT: 62 IN | OXYGEN SATURATION: 98 % | DIASTOLIC BLOOD PRESSURE: 80 MMHG | WEIGHT: 206.6 LBS

## 2023-05-24 DIAGNOSIS — E78.00 ELEVATED LDL CHOLESTEROL LEVEL: ICD-10-CM

## 2023-05-24 DIAGNOSIS — R16.0 ENLARGED LIVER: ICD-10-CM

## 2023-05-24 DIAGNOSIS — E78.49 OTHER HYPERLIPIDEMIA: ICD-10-CM

## 2023-05-24 DIAGNOSIS — G60.0 CHARCOT-MARIE-TOOTH DISEASE OF NEURONAL TYPE: ICD-10-CM

## 2023-05-24 DIAGNOSIS — F43.10 PTSD (POST-TRAUMATIC STRESS DISORDER): ICD-10-CM

## 2023-05-24 DIAGNOSIS — K76.0 STEATOSIS OF LIVER: ICD-10-CM

## 2023-05-24 DIAGNOSIS — Z23 NEED FOR VACCINATION: ICD-10-CM

## 2023-05-24 DIAGNOSIS — F31.62 BIPOLAR DISORDER, CURRENT EPISODE MIXED, MODERATE (HCC): ICD-10-CM

## 2023-05-24 DIAGNOSIS — R10.13 EPIGASTRIC PAIN: ICD-10-CM

## 2023-05-24 PROCEDURE — 90471 IMMUNIZATION ADMIN: CPT | Performed by: PHYSICIAN ASSISTANT

## 2023-05-24 PROCEDURE — 3079F DIAST BP 80-89 MM HG: CPT | Performed by: PHYSICIAN ASSISTANT

## 2023-05-24 PROCEDURE — 90472 IMMUNIZATION ADMIN EACH ADD: CPT | Performed by: PHYSICIAN ASSISTANT

## 2023-05-24 PROCEDURE — 99214 OFFICE O/P EST MOD 30 MIN: CPT | Mod: 25 | Performed by: PHYSICIAN ASSISTANT

## 2023-05-24 PROCEDURE — 90746 HEPB VACCINE 3 DOSE ADULT IM: CPT | Performed by: PHYSICIAN ASSISTANT

## 2023-05-24 PROCEDURE — 90677 PCV20 VACCINE IM: CPT | Performed by: PHYSICIAN ASSISTANT

## 2023-05-24 PROCEDURE — 3074F SYST BP LT 130 MM HG: CPT | Performed by: PHYSICIAN ASSISTANT

## 2023-05-24 RX ORDER — LAMOTRIGINE 150 MG/1
150 TABLET ORAL DAILY
Qty: 90 TABLET | Refills: 0 | Status: SHIPPED | OUTPATIENT
Start: 2023-05-24

## 2023-05-24 ASSESSMENT — FIBROSIS 4 INDEX: FIB4 SCORE: 0.35

## 2023-05-24 NOTE — PROGRESS NOTES
cc:  hyperlipidemia    Subjective:     Jennifer Gee is a 37 y.o. female presenting for hyperlipidemia      Patient presents to the office for hyperlipidemia.    Patient presents to the office to follow-up with her most recent labs.  Labs do show hyperlipidemia and patient does have a history of this.    Patient feels that she needs to increase the lamotrigine.  Her psychiatrist cut her down and she began to feel worse.  She is wanting to go back on the medication.  She has an appointment on June 15th to see psychiatry.  She is being seen for bipolar disorder and PTSD.      Patient continues to have epigastric pain.  Ultrasound did show a fatty and enlarged liver.  Pain occurs after meals.  Patient would like to evaluate gallbladder further by obtaining a HIDA scan.    Tico has an appointment with neurology in June.  She states that her pain is worse.  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  She states that she is also losing sensation.  Her last EMG was in 2018.  She would like to repeat to see if it can be determined how fast the deterioration is.     Review of systems:  See above.   Denies any symptoms unless previously indicated.        Current Outpatient Medications:     lamotrigine (LAMICTAL) 150 MG tablet, Take 1 Tablet by mouth every day., Disp: 90 Tablet, Rfl: 0    cyclobenzaprine (FLEXERIL) 10 mg Tab, Take 1 Tablet by mouth 3 times a day as needed for Moderate Pain., Disp: 90 Tablet, Rfl: 1    ibuprofen (MOTRIN) 800 MG Tab, Take 1 Tablet by mouth every 8 hours as needed for Moderate Pain., Disp: 90 Tablet, Rfl: 1    metformin (GLUCOPHAGE) 1000 MG tablet, Take 1 Tablet by mouth 2 times a day with meals., Disp: 180 Tablet, Rfl: 1    tetracycline (SUMYCIN) 250 MG Cap, Take 1 Capsule by mouth 2 times a day., Disp: 180 Capsule, Rfl: 1    traZODone (DESYREL) 100 MG Tab, Take 1 Tablet by mouth at bedtime., Disp: 90 Tablet, Rfl: 0    pantoprazole (PROTONIX) 40 MG Tablet Delayed Response, Take 1  "Tablet by mouth every day., Disp: 90 Tablet, Rfl: 1    albuterol 108 (90 Base) MCG/ACT Aero Soln inhalation aerosol, Inhale 2 Puffs every 6 hours as needed for Shortness of Breath., Disp: 18 g, Rfl: 4    vitamin D2, Ergocalciferol, (DRISDOL) 1.25 MG (61879 UT) Cap capsule, TAKE ONE CAPSULE BY MOUTH ONCE WEEKLY, Disp: 12 Capsule, Rfl: 2    Clonazepam 1 MG TABLET DISPERSIBLE, , Disp: , Rfl:     loratadine (CLARITIN) 10 MG Tab, Take 1 Tablet by mouth every day., Disp: 30 Tablet, Rfl: 3    fluticasone (FLONASE) 50 MCG/ACT nasal spray, Administer 2 Sprays into affected nostril(S) every day., Disp: 16 g, Rfl: 5    NON SPECIFIED, Massage therapy for neuropathy., Disp: 1 Each, Rfl: 12    esomeprazole (NEXIUM) 40 MG delayed-release capsule, Take 1 Capsule by mouth every day., Disp: 90 Capsule, Rfl: 1    Allergies, past medical history, past surgical history, family history, social history reviewed and updated    Objective:     Vitals: /80 (BP Location: Left arm, Patient Position: Sitting, BP Cuff Size: Large adult)   Pulse 73   Temp 36.7 °C (98.1 °F) (Temporal)   Resp 16   Ht 1.575 m (5' 2\")   Wt 93.7 kg (206 lb 9.6 oz)   LMP 05/23/2023 (Exact Date)   SpO2 98%   BMI 37.79 kg/m²   General: Alert, pleasant, NAD  EYES:   PERRL, EOMI, no icterus or pallor.  Conjunctivae and lids normal.   HENT:  Normocephalic.  External ears normal.   Neck supple.    Heart: Regular rate and rhythm.  S1 and S2 normal.  No murmurs appreciated.  Respiratory: Normal respiratory effort.  Clear to auscultation bilaterally.  Abdomen: obese  Skin: Warm, dry, no rashes.  Musculoskeletal: Gait is normal.  Moves all extremities well.    Extremities: normal range of motion all extremities.   Neurological: No tremors, sensation grossly intact,  CN2-12 intact.  Psych:  Affect/mood is normal, judgement is good, memory is intact, grooming is appropriate.     Latest Reference Range & Units 05/04/23 12:38 05/04/23 12:40   Sodium 135 - 145 mmol/L 139 "    Potassium 3.6 - 5.5 mmol/L 4.0    Chloride 96 - 112 mmol/L 100    Co2 20 - 33 mmol/L 22    Anion Gap 7.0 - 16.0  17.0 (H)    Glucose 65 - 99 mg/dL 76    Bun 8 - 22 mg/dL 12    Creatinine 0.50 - 1.40 mg/dL 0.62    GFR (CKD-EPI) >60 mL/min/1.73 m 2 117    Calcium 8.5 - 10.5 mg/dL 9.4    Correct Calcium 8.5 - 10.5 mg/dL 8.8    AST(SGOT) 12 - 45 U/L 20    ALT(SGPT) 2 - 50 U/L 34    Alkaline Phosphatase 30 - 99 U/L 76    Total Bilirubin 0.1 - 1.5 mg/dL 0.4    Albumin 3.2 - 4.9 g/dL 4.7    Total Protein 6.0 - 8.2 g/dL 7.3    Globulin 1.9 - 3.5 g/dL 2.6    A-G Ratio g/dL 1.8    Lipase 11 - 82 U/L 23    Amylase 20 - 103 U/L 34    Glycohemoglobin 4.0 - 5.6 % 5.3    Estim. Avg Glu mg/dL 105    Fasting Status  Fasting    Cholesterol,Tot 100 - 199 mg/dL 201 (H)    Triglycerides 0 - 149 mg/dL 183 (H)    HDL >=40 mg/dL 31 !    LDL <100 mg/dL 133 (H)    Urobilinogen, Urine Negative   0.2   Color   Yellow   Character   Clear   Specific Gravity <1.035   1.005   Ph 5.0 - 8.0   6.5   Glucose Negative mg/dL  Negative   Ketones Negative mg/dL  Negative   Bilirubin Negative   Negative   Occult Blood Negative   Negative   Protein Negative mg/dL  Negative   Nitrite Negative   Negative   Leukocyte Esterase Negative   Negative   Micro Urine Req   see below   25-Hydroxy   Vitamin D 25 30 - 100 ng/mL 61    TSH 0.380 - 5.330 uIU/mL 2.900    (H): Data is abnormally high  !: Data is abnormal    HISTORY/REASON FOR EXAM:  Pain  Abdominal pain     TECHNIQUE/EXAM DESCRIPTION AND NUMBER OF VIEWS:  Real-time sonography of the liver and biliary tree.     COMPARISON: 8/26/2022     FINDINGS:  The liver is normal in contour. There is no evidence of solid mass lesion. The liver is enlarged measuring 17.94 cm. There is diffusely increased hepatic parenchymal echogenicity.     The gallbladder is normal. There is no evidence of cholelithiasis.  The gallbladder wall thickness measures 2.80 mm. There is no pericholecystic fluid.  The common duct measures 2.90  mm.     The visualized pancreas is unremarkable.  The visualized aorta is normal in caliber.     Intrahepatic IVC is patent.     The portal vein is patent with hepatopetal flow. The MPV measures 1.23 cm.     The right kidney measures 9.60 cm.     There is no ascites.     IMPRESSION:     1.  Hepatic steatosis and/or diffuse hepatocellular disease.  2.  Hepatomegaly.    Assessment/Plan:     Jennifer was seen today for results and med change request.    Diagnoses and all orders for this visit:    Other hyperlipidemia  -     NM-BILIARY HIDA SCAN FATTY MEAL; Future  -     Lipid Profile; Future  -     Comp Metabolic Panel; Future  Elevated LDL cholesterol level  -     NM-BILIARY HIDA SCAN FATTY MEAL; Future  -     Lipid Profile; Future  -     Comp Metabolic Panel; Future    Labs are stable.  There is a slight increase in her cholesterol.  We will repeat labs in 6 months.    PTSD (post-traumatic stress disorder)  -     lamotrigine (LAMICTAL) 150 MG tablet; Take 1 Tablet by mouth every day.  Bipolar disorder, current episode mixed, moderate (HCC)  -     lamotrigine (LAMICTAL) 150 MG tablet; Take 1 Tablet by mouth every day.    Patient does have an appointment coming up with psychiatry.  I will increase her dose at this time but any further adjustments will need to come from her psychiatrist.    Epigastric pain  Steatosis of liver  Enlarged liver    Symptoms persist although ultrasound is negative.  We will obtain a HIDA scan for further evaluation.    Charcot-Michelle-Tooth disease of neuronal type  -     Referral to Neurodiagnostics (EEG,EP,EMG/NCS/DBS)    Patient requesting further neurodiagnostics.  Ordered at this time.    Need for vaccination  -     Hepatitis B Vaccine Adult 20+  -     Pneumococcal Conjugate Vaccine 20-Valent (19 yrs+)    Vaccines given today.        No follow-ups on file.    Please note that this dictation was created using voice recognition software. I have made every reasonable attempt to correct obvious  errors, but expect that there are errors of grammar and possible content that I did not discover before finalizing note.

## 2023-05-30 ENCOUNTER — PATIENT MESSAGE (OUTPATIENT)
Dept: MEDICAL GROUP | Facility: CLINIC | Age: 38
End: 2023-05-30
Payer: COMMERCIAL

## 2023-05-30 DIAGNOSIS — R10.13 DYSPEPSIA: ICD-10-CM

## 2023-05-30 DIAGNOSIS — R11.0 NAUSEA: ICD-10-CM

## 2023-06-01 DIAGNOSIS — E55.9 VITAMIN D DEFICIENCY: ICD-10-CM

## 2023-06-01 RX ORDER — ERGOCALCIFEROL 1.25 MG/1
CAPSULE ORAL
Qty: 12 CAPSULE | Refills: 2 | Status: SHIPPED | OUTPATIENT
Start: 2023-06-01

## 2023-06-01 NOTE — TELEPHONE ENCOUNTER
Was the patient seen in the last year in this department? Yes    Does patient have an active prescription for medications requested? Yes    Received Request Via: Pharmacy    Hospital Outpatient Visit on 05/04/2023   Component Date Value    Significant Indicator 05/04/2023 NEG     Source 05/04/2023 UR     Site 05/04/2023 URINE, CLEAN CATCH     Culture Result 05/04/2023 Usual urogenital cody 10-50,000 cfu/mL     Lipase 05/04/2023 23     Amylase 05/04/2023 34     TSH 05/04/2023 2.900     Sodium 05/04/2023 139     Potassium 05/04/2023 4.0     Chloride 05/04/2023 100     Co2 05/04/2023 22     Anion Gap 05/04/2023 17.0 (H)     Glucose 05/04/2023 76     Bun 05/04/2023 12     Creatinine 05/04/2023 0.62     Calcium 05/04/2023 9.4     AST(SGOT) 05/04/2023 20     ALT(SGPT) 05/04/2023 34     Alkaline Phosphatase 05/04/2023 76     Total Bilirubin 05/04/2023 0.4     Albumin 05/04/2023 4.7     Total Protein 05/04/2023 7.3     Globulin 05/04/2023 2.6     A-G Ratio 05/04/2023 1.8     25-Hydroxy   Vitamin D 25 05/04/2023 61     Glycohemoglobin 05/04/2023 5.3     Est Avg Glucose 05/04/2023 105     Cholesterol,Tot 05/04/2023 201 (H)     Triglycerides 05/04/2023 183 (H)     HDL 05/04/2023 31 (A)     LDL 05/04/2023 133 (H)     Fasting Status 05/04/2023 Fasting     Color 05/04/2023 Yellow     Character 05/04/2023 Clear     Specific Gravity 05/04/2023 1.005     Ph 05/04/2023 6.5     Glucose 05/04/2023 Negative     Ketones 05/04/2023 Negative     Protein 05/04/2023 Negative     Bilirubin 05/04/2023 Negative     Urobilinogen, Urine 05/04/2023 0.2     Nitrite 05/04/2023 Negative     Leukocyte Esterase 05/04/2023 Negative     Occult Blood 05/04/2023 Negative     Micro Urine Req 05/04/2023 see below     Correct Calcium 05/04/2023 8.8     GFR (CKD-EPI) 05/04/2023 117    Hospital Outpatient Visit on 08/18/2022   Component Date Value    Significant Indicator 08/18/2022 NEG     Source 08/18/2022 [facility]     Site 08/18/2022 -     Culture Result  08/18/2022                      Value:No enteric pathogens isolated.  NOTE:  Stool cultures are screened for Shiga Toxins 1 and 2,  Salmonella, Shigella, Campylobacter, Aeromonas,  Plesiomonas, and Vibrio.      EHEC 08/18/2022 Negative for Shiga Toxin 1 and 2.     Campylobactor Antigen 08/18/2022                      Value:Negative for Campylobacter Antigen.  Test results are to be used in conjunction with information  available from the patient clinical evaluation and other  diagnostic procedures.      Pancreatic Elastase, Fec* 08/18/2022 >800     Ova and Parasite, Fecal * 08/18/2022 Negative     Significant Indicator 08/18/2022 NEG     Source 08/18/2022 [facility]     Site 08/18/2022 -     EHEC 08/18/2022 Negative for Shiga Toxin 1 and 2.    Hospital Outpatient Visit on 08/16/2022   Component Date Value    WBC 08/16/2022 11.7 (H)     RBC 08/16/2022 5.15     Hemoglobin 08/16/2022 14.7     Hematocrit 08/16/2022 43.8     MCV 08/16/2022 85.0     MCH 08/16/2022 28.5     MCHC 08/16/2022 33.6     RDW 08/16/2022 42.5     Platelet Count 08/16/2022 364     MPV 08/16/2022 10.1     Neutrophils-Polys 08/16/2022 73.20 (H)     Lymphocytes 08/16/2022 20.10 (L)     Monocytes 08/16/2022 4.60     Eosinophils 08/16/2022 1.40     Basophils 08/16/2022 0.30     Immature Granulocytes 08/16/2022 0.40     Nucleated RBC 08/16/2022 0.00     Neutrophils (Absolute) 08/16/2022 8.56 (H)     Lymphs (Absolute) 08/16/2022 2.35     Monos (Absolute) 08/16/2022 0.54     Eos (Absolute) 08/16/2022 0.16     Baso (Absolute) 08/16/2022 0.04     Immature Granulocytes (a* 08/16/2022 0.05     NRBC (Absolute) 08/16/2022 0.00     t-TG IgA 08/16/2022 <2     Immunoglobulin A 08/16/2022 166    ]

## 2023-06-07 ENCOUNTER — HOSPITAL ENCOUNTER (OUTPATIENT)
Dept: RADIOLOGY | Facility: MEDICAL CENTER | Age: 38
End: 2023-06-07
Attending: PHYSICIAN ASSISTANT
Payer: COMMERCIAL

## 2023-06-07 DIAGNOSIS — K81.9 CHOLECYSTITIS: ICD-10-CM

## 2023-06-07 DIAGNOSIS — E78.49 OTHER HYPERLIPIDEMIA: ICD-10-CM

## 2023-06-07 DIAGNOSIS — E78.00 ELEVATED LDL CHOLESTEROL LEVEL: ICD-10-CM

## 2023-06-07 PROCEDURE — 78227 HEPATOBIL SYST IMAGE W/DRUG: CPT

## 2023-06-09 ENCOUNTER — HOSPITAL ENCOUNTER (EMERGENCY)
Facility: MEDICAL CENTER | Age: 38
End: 2023-06-09
Attending: EMERGENCY MEDICINE
Payer: COMMERCIAL

## 2023-06-09 VITALS
OXYGEN SATURATION: 96 % | HEART RATE: 83 BPM | TEMPERATURE: 98 F | DIASTOLIC BLOOD PRESSURE: 98 MMHG | WEIGHT: 200.62 LBS | RESPIRATION RATE: 14 BRPM | HEIGHT: 62 IN | BODY MASS INDEX: 36.92 KG/M2 | SYSTOLIC BLOOD PRESSURE: 166 MMHG

## 2023-06-09 DIAGNOSIS — K82.8 BILIARY DYSKINESIA: ICD-10-CM

## 2023-06-09 DIAGNOSIS — R10.11 RIGHT UPPER QUADRANT ABDOMINAL PAIN: ICD-10-CM

## 2023-06-09 LAB
ALBUMIN SERPL BCP-MCNC: 4.5 G/DL (ref 3.2–4.9)
ALBUMIN/GLOB SERPL: 1.7 G/DL
ALP SERPL-CCNC: 74 U/L (ref 30–99)
ALT SERPL-CCNC: 29 U/L (ref 2–50)
ANION GAP SERPL CALC-SCNC: 16 MMOL/L (ref 7–16)
AST SERPL-CCNC: 16 U/L (ref 12–45)
BASOPHILS # BLD AUTO: 0.4 % (ref 0–1.8)
BASOPHILS # BLD: 0.04 K/UL (ref 0–0.12)
BILIRUB SERPL-MCNC: 0.4 MG/DL (ref 0.1–1.5)
BUN SERPL-MCNC: 12 MG/DL (ref 8–22)
CALCIUM ALBUM COR SERPL-MCNC: 9 MG/DL (ref 8.5–10.5)
CALCIUM SERPL-MCNC: 9.4 MG/DL (ref 8.5–10.5)
CHLORIDE SERPL-SCNC: 99 MMOL/L (ref 96–112)
CO2 SERPL-SCNC: 20 MMOL/L (ref 20–33)
CREAT SERPL-MCNC: 0.68 MG/DL (ref 0.5–1.4)
EOSINOPHIL # BLD AUTO: 0.16 K/UL (ref 0–0.51)
EOSINOPHIL NFR BLD: 1.7 % (ref 0–6.9)
ERYTHROCYTE [DISTWIDTH] IN BLOOD BY AUTOMATED COUNT: 42.9 FL (ref 35.9–50)
GFR SERPLBLD CREATININE-BSD FMLA CKD-EPI: 114 ML/MIN/1.73 M 2
GLOBULIN SER CALC-MCNC: 2.6 G/DL (ref 1.9–3.5)
GLUCOSE SERPL-MCNC: 92 MG/DL (ref 65–99)
HCG SERPL QL: NEGATIVE
HCT VFR BLD AUTO: 46.3 % (ref 37–47)
HGB BLD-MCNC: 15.5 G/DL (ref 12–16)
IMM GRANULOCYTES # BLD AUTO: 0.03 K/UL (ref 0–0.11)
IMM GRANULOCYTES NFR BLD AUTO: 0.3 % (ref 0–0.9)
LIPASE SERPL-CCNC: 21 U/L (ref 11–82)
LYMPHOCYTES # BLD AUTO: 2.15 K/UL (ref 1–4.8)
LYMPHOCYTES NFR BLD: 22.9 % (ref 22–41)
MCH RBC QN AUTO: 28.2 PG (ref 27–33)
MCHC RBC AUTO-ENTMCNC: 33.5 G/DL (ref 32.2–35.5)
MCV RBC AUTO: 84.2 FL (ref 81.4–97.8)
MONOCYTES # BLD AUTO: 0.53 K/UL (ref 0–0.85)
MONOCYTES NFR BLD AUTO: 5.7 % (ref 0–13.4)
NEUTROPHILS # BLD AUTO: 6.46 K/UL (ref 1.82–7.42)
NEUTROPHILS NFR BLD: 69 % (ref 44–72)
NRBC # BLD AUTO: 0 K/UL
NRBC BLD-RTO: 0 /100 WBC (ref 0–0.2)
PLATELET # BLD AUTO: 299 K/UL (ref 164–446)
PMV BLD AUTO: 9.8 FL (ref 9–12.9)
POTASSIUM SERPL-SCNC: 3.7 MMOL/L (ref 3.6–5.5)
PROT SERPL-MCNC: 7.1 G/DL (ref 6–8.2)
RBC # BLD AUTO: 5.5 M/UL (ref 4.2–5.4)
SODIUM SERPL-SCNC: 135 MMOL/L (ref 135–145)
WBC # BLD AUTO: 9.4 K/UL (ref 4.8–10.8)

## 2023-06-09 PROCEDURE — 99284 EMERGENCY DEPT VISIT MOD MDM: CPT

## 2023-06-09 PROCEDURE — 700111 HCHG RX REV CODE 636 W/ 250 OVERRIDE (IP): Performed by: EMERGENCY MEDICINE

## 2023-06-09 PROCEDURE — 80053 COMPREHEN METABOLIC PANEL: CPT

## 2023-06-09 PROCEDURE — 85025 COMPLETE CBC W/AUTO DIFF WBC: CPT

## 2023-06-09 PROCEDURE — 83690 ASSAY OF LIPASE: CPT

## 2023-06-09 PROCEDURE — 84703 CHORIONIC GONADOTROPIN ASSAY: CPT

## 2023-06-09 PROCEDURE — 96372 THER/PROPH/DIAG INJ SC/IM: CPT

## 2023-06-09 PROCEDURE — 36415 COLL VENOUS BLD VENIPUNCTURE: CPT

## 2023-06-09 RX ORDER — ONDANSETRON 4 MG/1
4 TABLET, ORALLY DISINTEGRATING ORAL EVERY 8 HOURS PRN
Qty: 10 TABLET | Refills: 0 | Status: SHIPPED | OUTPATIENT
Start: 2023-06-09 | End: 2023-09-05

## 2023-06-09 RX ORDER — ONDANSETRON 4 MG/1
4 TABLET, ORALLY DISINTEGRATING ORAL ONCE
Status: COMPLETED | OUTPATIENT
Start: 2023-06-09 | End: 2023-06-09

## 2023-06-09 RX ORDER — HYDROMORPHONE HYDROCHLORIDE 1 MG/ML
1 INJECTION, SOLUTION INTRAMUSCULAR; INTRAVENOUS; SUBCUTANEOUS ONCE
Status: COMPLETED | OUTPATIENT
Start: 2023-06-09 | End: 2023-06-09

## 2023-06-09 RX ORDER — HYDROMORPHONE HYDROCHLORIDE 1 MG/ML
1 INJECTION, SOLUTION INTRAMUSCULAR; INTRAVENOUS; SUBCUTANEOUS ONCE
Status: DISCONTINUED | OUTPATIENT
Start: 2023-06-09 | End: 2023-06-09

## 2023-06-09 RX ORDER — OXYCODONE HYDROCHLORIDE AND ACETAMINOPHEN 5; 325 MG/1; MG/1
1 TABLET ORAL EVERY 4 HOURS PRN
Qty: 20 TABLET | Refills: 0 | Status: SHIPPED | OUTPATIENT
Start: 2023-06-09 | End: 2023-06-14

## 2023-06-09 RX ADMIN — ONDANSETRON 4 MG: 4 TABLET, ORALLY DISINTEGRATING ORAL at 13:18

## 2023-06-09 RX ADMIN — HYDROMORPHONE HYDROCHLORIDE 1 MG: 1 INJECTION, SOLUTION INTRAMUSCULAR; INTRAVENOUS; SUBCUTANEOUS at 13:17

## 2023-06-09 ASSESSMENT — FIBROSIS 4 INDEX: FIB4 SCORE: 0.35

## 2023-06-09 NOTE — ED PROVIDER NOTES
ER Provider Note    Scribed for Artie Kraft M.D. by Kyra Martinez. 6/9/2023   12:32 PM    Primary Care Provider: Romelia Estrada P.A.-C.    CHIEF COMPLAINT  Chief Complaint   Patient presents with    RUQ Pain     Reports that she has had RUQ since 5/3 and reports that she had a HIDA scan on 6/7 and was told that she needs immediate surg for removal.     N/V     Reports n/v denies diarrhea states +chills      EXTERNAL RECORDS REVIEWED  Other Patient was seen in clinic 5/24/23 and HIDA scan was ordered at that time to evaluate symptoms of epigastric pain. HIDA scan was completed two days ago which demonstrated no cholecystitis but revealed gallbladder ejection fracture of 2%.     HPI/ROS  LIMITATION TO HISTORY   Select: : None  OUTSIDE HISTORIAN(S):  None    Jennifer Gee is a 37 y.o. female who presents to the ED for evaluation of worsening right upper quadrant onset two days ago. The pain was originally intermittent but as of two days ago it worsened and became constant prompting her to visit the ED.  Now in the ED, patient is tearful. The patient states she also has nausea, chills, and vomiting, but denies any diarrhea. She has had back pain in the past but now in the ED, she is not experiencing any. Her last bowel movement was yesterday. Tylenol and Ibuprofen did little to alleviate her symptoms and no exacerbating factors were noted. She reports taking 800 mg. She was scheduled to see the surgeon on Monday but was also advised to visit the ED for worsening pain. She denies any history of smoking or drinking or abdominal surgeries. She denies a history of diabetes.     PAST MEDICAL HISTORY  Past Medical History:   Diagnosis Date    Bladder infection 2012    Borderline personality disorder (HCC)     cutter    Chickenpox     COVID-19     COVID-19 9/21/2021    Depression     Gastritis     History of dental surgery     Influenza     Obesity     Pneumonia 2007    Restless leg syndrome        SURGICAL  HISTORY  Past Surgical History:   Procedure Laterality Date    TONSILLECTOMY Bilateral 08/05/2019    UVULOPHARYNGOPALATOPLASTY         FAMILY HISTORY  Family History   Problem Relation Age of Onset    Hyperlipidemia Mother     Hypertension Mother     Alcohol/Drug Father     Heart Failure Father     Heart Disease Maternal Grandfather     Cancer Maternal Grandfather         Colon    Stroke Neg Hx     Diabetes Neg Hx        SOCIAL HISTORY   reports that she has never smoked. She has never used smokeless tobacco. She reports current drug use. Drugs: Inhaled and Marijuana. She reports that she does not drink alcohol.    CURRENT MEDICATIONS  Previous Medications    ALBUTEROL 108 (90 BASE) MCG/ACT AERO SOLN INHALATION AEROSOL    Inhale 2 Puffs every 6 hours as needed for Shortness of Breath.    CLONAZEPAM 1 MG TABLET DISPERSIBLE        CYCLOBENZAPRINE (FLEXERIL) 10 MG TAB    Take 1 Tablet by mouth 3 times a day as needed for Moderate Pain.    FLUTICASONE (FLONASE) 50 MCG/ACT NASAL SPRAY    Administer 2 Sprays into affected nostril(S) every day.    IBUPROFEN (MOTRIN) 800 MG TAB    Take 1 Tablet by mouth every 8 hours as needed for Moderate Pain.    LAMOTRIGINE (LAMICTAL) 150 MG TABLET    Take 1 Tablet by mouth every day.    LORATADINE (CLARITIN) 10 MG TAB    Take 1 Tablet by mouth every day.    METFORMIN (GLUCOPHAGE) 1000 MG TABLET    Take 1 Tablet by mouth 2 times a day with meals.    NON SPECIFIED    Massage therapy for neuropathy.    PANTOPRAZOLE (PROTONIX) 40 MG TABLET DELAYED RESPONSE    Take 1 Tablet by mouth every day.    TETRACYCLINE (SUMYCIN) 250 MG CAP    Take 1 Capsule by mouth 2 times a day.    TRAZODONE (DESYREL) 100 MG TAB    Take 1 Tablet by mouth at bedtime.    VITAMIN D2, ERGOCALCIFEROL, (DRISDOL) 1.25 MG (07347 UT) CAP CAPSULE    TAKE ONE CAPSULE BY MOUTH ONCE WEEKLY       ALLERGIES  No Known Allergies     PHYSICAL EXAM  BP (!) 166/79   Pulse 95   Temp 36.6 °C (97.9 °F) (Temporal)   Resp 20   Ht 1.575  "m (5' 2\")   Wt 91 kg (200 lb 9.9 oz)   LMP 05/23/2023 (Exact Date)   SpO2 99%   BMI 36.69 kg/m²      Nursing note and vitals reviewed.  Constitutional: Well-developed and well-nourished. Moderate distress.   HENT: Head is normocephalic and atraumatic. Oropharynx is clear and moist without exudate or erythema.   Eyes: Pupils are equal, round, and reactive to light. Conjunctiva are normal.   Cardiovascular: Normal rate and regular rhythm. No murmur heard. Normal radial pulses.  Pulmonary/Chest: Breath sounds normal. No wheezes or rales.   Abdominal: Soft, and non-distended. Tenderness to the right upper quadrant. Normal active bowel sounds. No guarding or peritoneal signs. No palpable abdominal aortic aneurysm. No masses. No tenderness at McBurney's point.   Musculoskeletal: Extremities exhibit normal range of motion without edema or tenderness.   Neurological: Awake, alert and oriented to person, place, and time. No focal deficits noted.  Skin: Skin is warm and dry. No rash.  Psychiatric: Normal mood and affect. Appropriate for clinical situation     DIAGNOSTIC STUDIES    Labs:   Results for orders placed or performed during the hospital encounter of 06/09/23   CBC WITH DIFFERENTIAL   Result Value Ref Range    WBC 9.4 4.8 - 10.8 K/uL    RBC 5.50 (H) 4.20 - 5.40 M/uL    Hemoglobin 15.5 12.0 - 16.0 g/dL    Hematocrit 46.3 37.0 - 47.0 %    MCV 84.2 81.4 - 97.8 fL    MCH 28.2 27.0 - 33.0 pg    MCHC 33.5 32.2 - 35.5 g/dL    RDW 42.9 35.9 - 50.0 fL    Platelet Count 299 164 - 446 K/uL    MPV 9.8 9.0 - 12.9 fL    Neutrophils-Polys 69.00 44.00 - 72.00 %    Lymphocytes 22.90 22.00 - 41.00 %    Monocytes 5.70 0.00 - 13.40 %    Eosinophils 1.70 0.00 - 6.90 %    Basophils 0.40 0.00 - 1.80 %    Immature Granulocytes 0.30 0.00 - 0.90 %    Nucleated RBC 0.00 0.00 - 0.20 /100 WBC    Neutrophils (Absolute) 6.46 1.82 - 7.42 K/uL    Lymphs (Absolute) 2.15 1.00 - 4.80 K/uL    Monos (Absolute) 0.53 0.00 - 0.85 K/uL    Eos (Absolute) " 0.16 0.00 - 0.51 K/uL    Baso (Absolute) 0.04 0.00 - 0.12 K/uL    Immature Granulocytes (abs) 0.03 0.00 - 0.11 K/uL    NRBC (Absolute) 0.00 K/uL       EKG:   I have independently interpreted this EKG as detailed above.       INITIAL ASSESSMENT AND PLAN    12:32 PM - Patient was evaluated at bedside. Ordered for Urinalysis, HCG Qual Serum, Lipase, CMP, CBC w/ Diff to evaluate. The patient will be medicated with Dilaudid and Zofran for her symptoms. Patient verbalizes understanding and support with my plan of care.  Differential diagnoses include but not limited to: Biliary colic    ED Observation Status? Yes; I am placing the patient in to an observation status due to a diagnostic uncertainty as well as therapeutic intensity. Patient placed in observation status at 12:47 PM, 6/9/2023.     Observation plan is as follows: Labs and Pain management     Upon Reevaluation, the patient's condition has: Improved; and will be discharged.    Patient discharged from ED Observation status at 1:46 PM  (Time) 6/9/2023  (Date).      COURSE AND MEDICAL DECISION MAKING    Patient presents today with a history of an abnormal HIDA scan.  She has been having slowly worsening epigastric and right upper quadrant abdominal pain.  Review of the electronic medical record demonstrates the patient has a HIDA scan that shows a very low ejection fraction.  Will require cholecystectomy.  I do not feel this needs to be done emergently.  The patient is treated symptomatically.  Laboratory studies are reassuring.  White blood cell count is normal.  LFTs are normal.  I spoke with the surgeon.  He agrees.  He will happily see the patient in clinic on Monday.  Patient contact information provided.  He will have his office get in touch with her for an appointment on Monday.    12:56 PM - Paged Dr. Casillas (surgery)     DISPOSITION AND DISCUSSIONS    I have discussed management of the patient with the following physicians and OCTAVIO's:  Dr. Casillas (Surgery)      Escalation of care considered, and ultimately not performed: diagnostic imaging and acute inpatient care management, however at this time, the patient is most appropriate for outpatient management.    Barriers to care at this time, including but not limited to:  Patient has not established with a surgeon yet .     Decision tools and prescription drugs considered including, but not limited to: Antibiotics   . I considered prescribing antibiotics, however I have not identified a bacterial source of infection.    I reviewed prescription monitoring program for patient's narcotic use before prescribing a scheduled drug.The patient will not drink alcohol nor drive with prescribed medications. The patient will return for new or worsening symptoms and is stable at the time of discharge.    The patient is referred to a primary physician for blood pressure management, diabetic screening, and for all other preventative health concerns.    In prescribing controlled substances to this patient, I certify that I have obtained and reviewed the medical history of Jennifer Gee. I have also made a good gokul effort to obtain applicable records from other providers who have treated the patient and records did not demonstrate any increased risk of substance abuse that would prevent me from prescribing controlled substances.     I have conducted a physical exam and documented it. I have reviewed Ms. Gee’s prescription history as maintained by the [place] Prescription Monitoring Program.     I have assessed the patient’s risk for abuse, dependency, and addiction using the validated Opioid Risk Tool available at https://www.mdcalc.com/dxdgrq-avsc-ihoo-ort-narcotic-abuse.     Given the above, I believe the benefits of controlled substance therapy outweigh the risks. The reasons for prescribing controlled substances include non-narcotic, oral analgesic alternatives have been inadequate for pain control. Accordingly, I have  discussed the risk and benefits, treatment plan, and alternative therapies with the patient.       DISPOSITION:  Patient will be discharged home in stable condition.    FOLLOW UP:  Romelia Estrada P.A.-C.  [address] [address]  [address]  [facility] [id number] [phone]    Schedule an appointment as soon as possible for a visit       [facility], Emergency Dept  [address] [facility] [facility] [address] [phone]    If symptoms worsen    Lucius Casillas M.D.  [address] [address]  [facility] [id number] [phone]    Call on 6/9/2023        OUTPATIENT MEDICATIONS:  Discharge Medication List as of 6/9/2023  1:23 PM        START taking these medications    Details   oxyCODONE-acetaminophen (PERCOCET) 5-325 MG Tab Take 1 Tablet by mouth every four hours as needed for Moderate Pain for up to 5 days., Disp-20 Tablet, R-0, Normal      ondansetron (ZOFRAN ODT) 4 MG TABLET DISPERSIBLE Take 1 Tablet by mouth every 8 hours as needed for Nausea/Vomiting., Disp-10 Tablet, R-0, Normal               FINAL DIANGOSIS  1. Right upper quadrant abdominal pain    2. Biliary dyskinesia         Kyra MEADE (Shubham), am scribing for, and in the presence of, Artie Kraft M.D..    Electronically signed by: Kyra Minor), 6/9/2023    Artie MEADE M.D. personally performed the services described in this documentation, as scribed by Kyra Martinez in my presence, and it is both accurate and complete.      The note accurately reflects work and decisions made by me.  Artie Kraft M.D.  6/9/2023  1:48 PM

## 2023-06-09 NOTE — ED TRIAGE NOTES
"Chief Complaint   Patient presents with    RUQ Pain     Reports that she has had RUQ since 5/3 and reports that she had a HIDA scan on 6/7 and was told that she needs immediate surg for removal.     N/V     Reports n/v denies diarrhea states +chills      Pt ambulatory to triage for above complaint. Pt tearful in triage, states that her pain has been worsening over the last 2 days.     Abd pain protocol ordered in triage.     BP (!) 166/79   Pulse 95   Temp 36.6 °C (97.9 °F) (Temporal)   Resp 20   Ht 1.575 m (5' 2\")   Wt 91 kg (200 lb 9.9 oz)   LMP 05/23/2023 (Exact Date)   SpO2 99%   BMI 36.69 kg/m²     "

## 2023-06-20 ENCOUNTER — TELEPHONE (OUTPATIENT)
Dept: HEALTH INFORMATION MANAGEMENT | Facility: OTHER | Age: 38
End: 2023-06-20
Payer: COMMERCIAL

## 2023-06-22 DIAGNOSIS — L70.0 ACNE VULGARIS: ICD-10-CM

## 2023-06-22 RX ORDER — TETRACYCLINE HYDROCHLORIDE 250 MG/1
250 CAPSULE ORAL 2 TIMES DAILY
Qty: 180 CAPSULE | Refills: 1 | Status: SHIPPED | OUTPATIENT
Start: 2023-06-22 | End: 2023-12-12

## 2023-06-22 NOTE — TELEPHONE ENCOUNTER
Pharmacy states that they did not receive Rx       Was the patient seen in the last year in this department? Yes    Does patient have an active prescription for medications requested? Yes    Received Request Via: Pharmacy    Admission on 06/09/2023, Discharged on 06/09/2023   Component Date Value    WBC 06/09/2023 9.4     RBC 06/09/2023 5.50 (H)     Hemoglobin 06/09/2023 15.5     Hematocrit 06/09/2023 46.3     MCV 06/09/2023 84.2     MCH 06/09/2023 28.2     MCHC 06/09/2023 33.5     RDW 06/09/2023 42.9     Platelet Count 06/09/2023 299     MPV 06/09/2023 9.8     Neutrophils-Polys 06/09/2023 69.00     Lymphocytes 06/09/2023 22.90     Monocytes 06/09/2023 5.70     Eosinophils 06/09/2023 1.70     Basophils 06/09/2023 0.40     Immature Granulocytes 06/09/2023 0.30     Nucleated RBC 06/09/2023 0.00     Neutrophils (Absolute) 06/09/2023 6.46     Lymphs (Absolute) 06/09/2023 2.15     Monos (Absolute) 06/09/2023 0.53     Eos (Absolute) 06/09/2023 0.16     Baso (Absolute) 06/09/2023 0.04     Immature Granulocytes (a* 06/09/2023 0.03     NRBC (Absolute) 06/09/2023 0.00     Sodium 06/09/2023 135     Potassium 06/09/2023 3.7     Chloride 06/09/2023 99     Co2 06/09/2023 20     Anion Gap 06/09/2023 16.0     Glucose 06/09/2023 92     Bun 06/09/2023 12     Creatinine 06/09/2023 0.68     Calcium 06/09/2023 9.4     AST(SGOT) 06/09/2023 16     ALT(SGPT) 06/09/2023 29     Alkaline Phosphatase 06/09/2023 74     Total Bilirubin 06/09/2023 0.4     Albumin 06/09/2023 4.5     Total Protein 06/09/2023 7.1     Globulin 06/09/2023 2.6     A-G Ratio 06/09/2023 1.7     Lipase 06/09/2023 21     Beta-Hcg Qualitative Ser* 06/09/2023 Negative     Correct Calcium 06/09/2023 9.0     GFR (CKD-EPI) 06/09/2023 114    Hospital Outpatient Visit on 05/04/2023   Component Date Value    Significant Indicator 05/04/2023 NEG     Source 05/04/2023 UR     Site 05/04/2023 URINE, CLEAN CATCH     Culture Result 05/04/2023 Usual urogenital cody 10-50,000 cfu/mL      Lipase 05/04/2023 23     Amylase 05/04/2023 34     TSH 05/04/2023 2.900     Sodium 05/04/2023 139     Potassium 05/04/2023 4.0     Chloride 05/04/2023 100     Co2 05/04/2023 22     Anion Gap 05/04/2023 17.0 (H)     Glucose 05/04/2023 76     Bun 05/04/2023 12     Creatinine 05/04/2023 0.62     Calcium 05/04/2023 9.4     AST(SGOT) 05/04/2023 20     ALT(SGPT) 05/04/2023 34     Alkaline Phosphatase 05/04/2023 76     Total Bilirubin 05/04/2023 0.4     Albumin 05/04/2023 4.7     Total Protein 05/04/2023 7.3     Globulin 05/04/2023 2.6     A-G Ratio 05/04/2023 1.8     25-Hydroxy   Vitamin D 25 05/04/2023 61     Glycohemoglobin 05/04/2023 5.3     Est Avg Glucose 05/04/2023 105     Cholesterol,Tot 05/04/2023 201 (H)     Triglycerides 05/04/2023 183 (H)     HDL 05/04/2023 31 (A)     LDL 05/04/2023 133 (H)     Fasting Status 05/04/2023 Fasting     Color 05/04/2023 Yellow     Character 05/04/2023 Clear     Specific Gravity 05/04/2023 1.005     Ph 05/04/2023 6.5     Glucose 05/04/2023 Negative     Ketones 05/04/2023 Negative     Protein 05/04/2023 Negative     Bilirubin 05/04/2023 Negative     Urobilinogen, Urine 05/04/2023 0.2     Nitrite 05/04/2023 Negative     Leukocyte Esterase 05/04/2023 Negative     Occult Blood 05/04/2023 Negative     Micro Urine Req 05/04/2023 see below     Correct Calcium 05/04/2023 8.8     GFR (CKD-EPI) 05/04/2023 117    Hospital Outpatient Visit on 08/18/2022   Component Date Value    Significant Indicator 08/18/2022 NEG     Source 08/18/2022 STL     Site 08/18/2022 -     Culture Result 08/18/2022                      Value:No enteric pathogens isolated.  NOTE:  Stool cultures are screened for Shiga Toxins 1 and 2,  Salmonella, Shigella, Campylobacter, Aeromonas,  Plesiomonas, and Vibrio.      EHEC 08/18/2022 Negative for Shiga Toxin 1 and 2.     Campylobactor Antigen 08/18/2022                      Value:Negative for Campylobacter Antigen.  Test results are to be used in conjunction with  information  available from the patient clinical evaluation and other  diagnostic procedures.      Pancreatic Elastase, Fec* 08/18/2022 >800     Ova and Parasite, Fecal * 08/18/2022 Negative     Significant Indicator 08/18/2022 NEG     Source 08/18/2022 STL     Site 08/18/2022 -     EHEC 08/18/2022 Negative for Shiga Toxin 1 and 2.    Hospital Outpatient Visit on 08/16/2022   Component Date Value    WBC 08/16/2022 11.7 (H)     RBC 08/16/2022 5.15     Hemoglobin 08/16/2022 14.7     Hematocrit 08/16/2022 43.8     MCV 08/16/2022 85.0     MCH 08/16/2022 28.5     MCHC 08/16/2022 33.6     RDW 08/16/2022 42.5     Platelet Count 08/16/2022 364     MPV 08/16/2022 10.1     Neutrophils-Polys 08/16/2022 73.20 (H)     Lymphocytes 08/16/2022 20.10 (L)     Monocytes 08/16/2022 4.60     Eosinophils 08/16/2022 1.40     Basophils 08/16/2022 0.30     Immature Granulocytes 08/16/2022 0.40     Nucleated RBC 08/16/2022 0.00     Neutrophils (Absolute) 08/16/2022 8.56 (H)     Lymphs (Absolute) 08/16/2022 2.35     Monos (Absolute) 08/16/2022 0.54     Eos (Absolute) 08/16/2022 0.16     Baso (Absolute) 08/16/2022 0.04     Immature Granulocytes (a* 08/16/2022 0.05     NRBC (Absolute) 08/16/2022 0.00     t-TG IgA 08/16/2022 <2     Immunoglobulin A 08/16/2022 166    ]

## 2023-07-12 ENCOUNTER — PATIENT MESSAGE (OUTPATIENT)
Dept: MEDICAL GROUP | Facility: CLINIC | Age: 38
End: 2023-07-12
Payer: COMMERCIAL

## 2023-07-12 DIAGNOSIS — E28.2 PCOS (POLYCYSTIC OVARIAN SYNDROME): ICD-10-CM

## 2023-08-04 DIAGNOSIS — G47.00 INSOMNIA, UNSPECIFIED TYPE: ICD-10-CM

## 2023-08-04 RX ORDER — TRAZODONE HYDROCHLORIDE 100 MG/1
100 TABLET ORAL
Qty: 90 TABLET | Refills: 0 | Status: SHIPPED | OUTPATIENT
Start: 2023-08-04

## 2023-08-04 NOTE — TELEPHONE ENCOUNTER
Was the patient seen in the last year in this department? Yes    Does patient have an active prescription for medications requested? Yes    Received Request Via: Pharmacy    Admission on 06/09/2023, Discharged on 06/09/2023   Component Date Value    WBC 06/09/2023 9.4     RBC 06/09/2023 5.50 (H)     Hemoglobin 06/09/2023 15.5     Hematocrit 06/09/2023 46.3     MCV 06/09/2023 84.2     MCH 06/09/2023 28.2     MCHC 06/09/2023 33.5     RDW 06/09/2023 42.9     Platelet Count 06/09/2023 299     MPV 06/09/2023 9.8     Neutrophils-Polys 06/09/2023 69.00     Lymphocytes 06/09/2023 22.90     Monocytes 06/09/2023 5.70     Eosinophils 06/09/2023 1.70     Basophils 06/09/2023 0.40     Immature Granulocytes 06/09/2023 0.30     Nucleated RBC 06/09/2023 0.00     Neutrophils (Absolute) 06/09/2023 6.46     Lymphs (Absolute) 06/09/2023 2.15     Monos (Absolute) 06/09/2023 0.53     Eos (Absolute) 06/09/2023 0.16     Baso (Absolute) 06/09/2023 0.04     Immature Granulocytes (a* 06/09/2023 0.03     NRBC (Absolute) 06/09/2023 0.00     Sodium 06/09/2023 135     Potassium 06/09/2023 3.7     Chloride 06/09/2023 99     Co2 06/09/2023 20     Anion Gap 06/09/2023 16.0     Glucose 06/09/2023 92     Bun 06/09/2023 12     Creatinine 06/09/2023 0.68     Calcium 06/09/2023 9.4     AST(SGOT) 06/09/2023 16     ALT(SGPT) 06/09/2023 29     Alkaline Phosphatase 06/09/2023 74     Total Bilirubin 06/09/2023 0.4     Albumin 06/09/2023 4.5     Total Protein 06/09/2023 7.1     Globulin 06/09/2023 2.6     A-G Ratio 06/09/2023 1.7     Lipase 06/09/2023 21     Beta-Hcg Qualitative Ser* 06/09/2023 Negative     Correct Calcium 06/09/2023 9.0     GFR (CKD-EPI) 06/09/2023 114    Hospital Outpatient Visit on 05/04/2023   Component Date Value    Significant Indicator 05/04/2023 NEG     Source 05/04/2023 UR     Site 05/04/2023 URINE, CLEAN CATCH     Culture Result 05/04/2023 Usual urogenital cody 10-50,000 cfu/mL     Lipase 05/04/2023 23     Amylase 05/04/2023 34      TSH 05/04/2023 2.900     Sodium 05/04/2023 139     Potassium 05/04/2023 4.0     Chloride 05/04/2023 100     Co2 05/04/2023 22     Anion Gap 05/04/2023 17.0 (H)     Glucose 05/04/2023 76     Bun 05/04/2023 12     Creatinine 05/04/2023 0.62     Calcium 05/04/2023 9.4     AST(SGOT) 05/04/2023 20     ALT(SGPT) 05/04/2023 34     Alkaline Phosphatase 05/04/2023 76     Total Bilirubin 05/04/2023 0.4     Albumin 05/04/2023 4.7     Total Protein 05/04/2023 7.3     Globulin 05/04/2023 2.6     A-G Ratio 05/04/2023 1.8     25-Hydroxy   Vitamin D 25 05/04/2023 61     Glycohemoglobin 05/04/2023 5.3     Est Avg Glucose 05/04/2023 105     Cholesterol,Tot 05/04/2023 201 (H)     Triglycerides 05/04/2023 183 (H)     HDL 05/04/2023 31 (A)     LDL 05/04/2023 133 (H)     Fasting Status 05/04/2023 Fasting     Color 05/04/2023 Yellow     Character 05/04/2023 Clear     Specific Gravity 05/04/2023 1.005     Ph 05/04/2023 6.5     Glucose 05/04/2023 Negative     Ketones 05/04/2023 Negative     Protein 05/04/2023 Negative     Bilirubin 05/04/2023 Negative     Urobilinogen, Urine 05/04/2023 0.2     Nitrite 05/04/2023 Negative     Leukocyte Esterase 05/04/2023 Negative     Occult Blood 05/04/2023 Negative     Micro Urine Req 05/04/2023 see below     Correct Calcium 05/04/2023 8.8     GFR (CKD-EPI) 05/04/2023 117    Hospital Outpatient Visit on 08/18/2022   Component Date Value    Significant Indicator 08/18/2022 NEG     Source 08/18/2022 STL     Site 08/18/2022 -     Culture Result 08/18/2022                      Value:No enteric pathogens isolated.  NOTE:  Stool cultures are screened for Shiga Toxins 1 and 2,  Salmonella, Shigella, Campylobacter, Aeromonas,  Plesiomonas, and Vibrio.      [facility] 08/18/2022 Negative for Shiga Toxin 1 and 2.     Campylobactor Antigen 08/18/2022                      Value:Negative for Campylobacter Antigen.  Test results are to be used in conjunction with information  available from the patient clinical evaluation and  other  diagnostic procedures.      Pancreatic Elastase, Fec* 08/18/2022 >800     Ova and Parasite, Fecal * 08/18/2022 Negative     Significant Indicator 08/18/2022 NEG     Source 08/18/2022 STL     Site 08/18/2022 -     [facility] 08/18/2022 Negative for Shiga Toxin 1 and 2.    Hospital Outpatient Visit on 08/16/2022   Component Date Value    WBC 08/16/2022 11.7 (H)     RBC 08/16/2022 5.15     Hemoglobin 08/16/2022 14.7     Hematocrit 08/16/2022 43.8     MCV 08/16/2022 85.0     MCH 08/16/2022 28.5     MCHC 08/16/2022 33.6     RDW 08/16/2022 42.5     Platelet Count 08/16/2022 364     MPV 08/16/2022 10.1     Neutrophils-Polys 08/16/2022 73.20 (H)     Lymphocytes 08/16/2022 20.10 (L)     Monocytes 08/16/2022 4.60     Eosinophils 08/16/2022 1.40     Basophils 08/16/2022 0.30     Immature Granulocytes 08/16/2022 0.40     Nucleated RBC 08/16/2022 0.00     Neutrophils (Absolute) 08/16/2022 8.56 (H)     Lymphs (Absolute) 08/16/2022 2.35     Monos (Absolute) 08/16/2022 0.54     Eos (Absolute) 08/16/2022 0.16     Baso (Absolute) 08/16/2022 0.04     Immature Granulocytes (a* 08/16/2022 0.05     NRBC (Absolute) 08/16/2022 0.00     t-TG IgA 08/16/2022 <2     Immunoglobulin A 08/16/2022 166    ]

## 2023-08-07 ENCOUNTER — NON-PROVIDER VISIT (OUTPATIENT)
Dept: MEDICAL GROUP | Facility: CLINIC | Age: 38
End: 2023-08-07
Payer: COMMERCIAL

## 2023-08-07 DIAGNOSIS — Z23 NEED FOR VACCINATION: ICD-10-CM

## 2023-08-07 PROCEDURE — 90471 IMMUNIZATION ADMIN: CPT | Performed by: PHYSICIAN ASSISTANT

## 2023-08-07 PROCEDURE — 90746 HEPB VACCINE 3 DOSE ADULT IM: CPT | Performed by: PHYSICIAN ASSISTANT

## 2023-08-07 NOTE — PROGRESS NOTES
"Jennifer Gee is a 37 y.o. female here for a non-provider visit for:   HEPATITIS B 2 of 3    Reason for immunization: continue or complete series started at the office  Immunization records indicate need for vaccine: Yes, confirmed with Epic  Minimum interval has been met for this vaccine: Yes  ABN completed: Not Indicated    VIS Dated  5/12/23 was given to patient: Yes  All IAC Questionnaire questions were answered \"No.\"    Patient tolerated injection and no adverse effects were observed or reported: Yes    Pt scheduled for next dose in series: Not Indicated    "

## 2023-08-10 ENCOUNTER — HOSPITAL ENCOUNTER (OUTPATIENT)
Dept: RADIOLOGY | Facility: MEDICAL CENTER | Age: 38
End: 2023-08-10
Attending: SPECIALIST
Payer: COMMERCIAL

## 2023-08-10 DIAGNOSIS — N63.0 MASS OF BREAST, UNSPECIFIED LATERALITY: ICD-10-CM

## 2023-08-10 DIAGNOSIS — N64.3 GALACTORRHEA NOT ASSOCIATED WITH CHILDBIRTH: ICD-10-CM

## 2023-08-10 PROCEDURE — G0279 TOMOSYNTHESIS, MAMMO: HCPCS

## 2023-08-10 PROCEDURE — 76642 ULTRASOUND BREAST LIMITED: CPT | Mod: RT

## 2023-08-12 ENCOUNTER — HOSPITAL ENCOUNTER (OUTPATIENT)
Dept: LAB | Facility: MEDICAL CENTER | Age: 38
End: 2023-08-12
Attending: SPECIALIST
Payer: COMMERCIAL

## 2023-08-12 DIAGNOSIS — R11.0 NAUSEA: ICD-10-CM

## 2023-08-12 DIAGNOSIS — E78.00 ELEVATED LDL CHOLESTEROL LEVEL: ICD-10-CM

## 2023-08-12 DIAGNOSIS — E78.49 OTHER HYPERLIPIDEMIA: ICD-10-CM

## 2023-08-12 DIAGNOSIS — R10.13 DYSPEPSIA: ICD-10-CM

## 2023-08-12 LAB
ALBUMIN SERPL BCP-MCNC: 4.5 G/DL (ref 3.2–4.9)
ALBUMIN/GLOB SERPL: 1.9 G/DL
ALP SERPL-CCNC: 71 U/L (ref 30–99)
ALT SERPL-CCNC: 20 U/L (ref 2–50)
ANION GAP SERPL CALC-SCNC: 13 MMOL/L (ref 7–16)
AST SERPL-CCNC: 18 U/L (ref 12–45)
BILIRUB SERPL-MCNC: 0.4 MG/DL (ref 0.1–1.5)
BUN SERPL-MCNC: 10 MG/DL (ref 8–22)
CALCIUM ALBUM COR SERPL-MCNC: 9.4 MG/DL (ref 8.5–10.5)
CALCIUM SERPL-MCNC: 9.8 MG/DL (ref 8.5–10.5)
CHLORIDE SERPL-SCNC: 102 MMOL/L (ref 96–112)
CHOLEST SERPL-MCNC: 178 MG/DL (ref 100–199)
CO2 SERPL-SCNC: 23 MMOL/L (ref 20–33)
CREAT SERPL-MCNC: 0.63 MG/DL (ref 0.5–1.4)
FASTING STATUS PATIENT QL REPORTED: NORMAL
GFR SERPLBLD CREATININE-BSD FMLA CKD-EPI: 116 ML/MIN/1.73 M 2
GLOBULIN SER CALC-MCNC: 2.4 G/DL (ref 1.9–3.5)
GLUCOSE SERPL-MCNC: 100 MG/DL (ref 65–99)
HDLC SERPL-MCNC: 30 MG/DL
LDLC SERPL CALC-MCNC: 126 MG/DL
POTASSIUM SERPL-SCNC: 4.3 MMOL/L (ref 3.6–5.5)
PROLACTIN SERPL-MCNC: 26.3 NG/ML (ref 2.8–26)
PROT SERPL-MCNC: 6.9 G/DL (ref 6–8.2)
SODIUM SERPL-SCNC: 138 MMOL/L (ref 135–145)
TRIGL SERPL-MCNC: 110 MG/DL (ref 0–149)
TSH SERPL DL<=0.005 MIU/L-ACNC: 2.9 UIU/ML (ref 0.38–5.33)

## 2023-08-12 PROCEDURE — 36415 COLL VENOUS BLD VENIPUNCTURE: CPT

## 2023-08-12 PROCEDURE — 80053 COMPREHEN METABOLIC PANEL: CPT

## 2023-08-12 PROCEDURE — 80061 LIPID PANEL: CPT

## 2023-08-12 PROCEDURE — 86003 ALLG SPEC IGE CRUDE XTRC EA: CPT | Mod: 91

## 2023-08-12 PROCEDURE — 84146 ASSAY OF PROLACTIN: CPT

## 2023-08-12 PROCEDURE — 84443 ASSAY THYROID STIM HORMONE: CPT

## 2023-08-15 LAB
ALMOND IGE QN: <0.1 KU/L
ASPARAGUS IGE QN: <0.1 KU/L
AVOCADO IGE QN: <0.1 KU/L
BAKER'S YEAST IGE QN: <0.1 KU/L
BANANA IGE QN: <0.1 KU/L
BASIL IGE QN: <0.1 KU/L
BAYLEAF IGE QN: <0.1 KU/L
BEEF IGE QN: <0.1 KU/L
BEET IGE QN: <0.1 KU/L
BELL PEPPER IGE QN: <0.1 KU/L
BLACK PEPPER IGE QN: <0.1 KU/L
BLUE MUSSEL IGE QN: <0.1 KU/L
BLUEBERRY IGE QN: <0.1 KU/L
BRAZIL NUT IGE QN: <0.1 KU/L
BROCCOLI IGE QN: <0.1 KU/L
BUCKWHEAT IGE QN: <0.1 KU/L
CABBAGE IGE QN: <0.1 KU/L
CARROT IGE QN: <0.1 KU/L
CASHEW NUT IGE QN: <0.1 KU/L
CHESTNUT IGE QN: <0.1 KU/L
CHICKPEA IGE AB [UNITS/VOLUME] IN SERUM: <0.1 KU/L
CHOCOLATE IGE QN: <0.1 KU/L
CINNAMON IGE QN: <0.1 KU/L
CLAM IGE QN: <0.1 KU/L
COCONUT IGE QN: <0.1 KU/L
CODFISH IGE QN: <0.1 KU/L
COW MILK IGE QN: <0.1 KU/L
CRAB IGE QN: <0.1 KU/L
CUCUMBER IGE QN: <0.1 KU/L
CULTIVATED COTTON IGE QN: <0.1 KU/L
DEPRECATED MISC ALLERGEN IGE RAST QL: NORMAL
DILL IGE QN: <0.1 KU/L
EGG WHITE IGE QN: <0.1 KU/L
GINGER IGE QN: <0.1 KU/L
GRAPE IGE QN: <0.1 KU/L
HALIBUT IGE QN: <0.1 KU/L
HAZELNUT IGE QN: <0.1 KU/L
LETTUCE IGE QN: <0.1 KU/L
LIMA BEAN IGE QN: <0.1 KU/L
MELON IGE QN: <0.1 KU/L
ONION IGE QN: <0.1 KU/L
ORANGE IGE QN: <0.1 KU/L
OREGANO IGE QN: <0.1 KU/L
PEA IGE QN: <0.1 KU/L
PEANUT IGE QN: <0.1 KU/L
PEAR IGE QN: <0.1 KU/L
PLUM IGE QN: <0.1 KU/L
PORK IGE QN: <0.1 KU/L
POTATO IGE QN: <0.1 KU/L
RASPBERRY IGE QN: <0.1 KU/L
SESAME SEED IGE QN: <0.1 KU/L
SHRIMP IGE QN: <0.1 KU/L
SOYBEAN IGE QN: <0.1 KU/L
TEA IGE QN: <0.1 KU/L
TOMATO IGE QN: <0.1 KU/L
TROUT IGE QN: <0.1 KU/L
TURKEY MEAT IGE QN: <0.1 KU/L
WALNUT IGE QN: <0.1 KU/L
WATERMELON IGE QN: <0.1 KU/L

## 2023-09-05 ENCOUNTER — TELEMEDICINE (OUTPATIENT)
Dept: MEDICAL GROUP | Facility: CLINIC | Age: 38
End: 2023-09-05
Payer: COMMERCIAL

## 2023-09-05 VITALS — BODY MASS INDEX: 37.17 KG/M2 | WEIGHT: 202 LBS | HEIGHT: 62 IN | RESPIRATION RATE: 16 BRPM

## 2023-09-05 DIAGNOSIS — R73.9 HYPERGLYCEMIA: ICD-10-CM

## 2023-09-05 DIAGNOSIS — E78.49 OTHER HYPERLIPIDEMIA: ICD-10-CM

## 2023-09-05 DIAGNOSIS — K21.9 GASTROESOPHAGEAL REFLUX DISEASE WITHOUT ESOPHAGITIS: ICD-10-CM

## 2023-09-05 DIAGNOSIS — E78.00 ELEVATED LDL CHOLESTEROL LEVEL: ICD-10-CM

## 2023-09-05 PROCEDURE — 99214 OFFICE O/P EST MOD 30 MIN: CPT | Mod: 95 | Performed by: PHYSICIAN ASSISTANT

## 2023-09-05 RX ORDER — TETRACYCLINE HYDROCHLORIDE 250 MG/1
1 CAPSULE ORAL 2 TIMES DAILY
COMMUNITY
End: 2023-09-05

## 2023-09-05 RX ORDER — IBUPROFEN 800 MG/1
1 TABLET ORAL EVERY 8 HOURS PRN
COMMUNITY
End: 2023-09-05

## 2023-09-05 RX ORDER — LAMOTRIGINE 150 MG/1
1 TABLET ORAL DAILY
COMMUNITY
End: 2023-09-05

## 2023-09-05 RX ORDER — OMEPRAZOLE 20 MG/1
20 CAPSULE, DELAYED RELEASE ORAL DAILY
Qty: 90 CAPSULE | Refills: 2 | Status: SHIPPED | OUTPATIENT
Start: 2023-09-05

## 2023-09-05 RX ORDER — TRAZODONE HYDROCHLORIDE 100 MG/1
1 TABLET ORAL
COMMUNITY
End: 2023-09-05

## 2023-09-05 RX ORDER — ONDANSETRON 4 MG/1
1 TABLET, ORALLY DISINTEGRATING ORAL EVERY 8 HOURS PRN
COMMUNITY
End: 2023-11-06

## 2023-09-05 RX ORDER — ERGOCALCIFEROL 1.25 MG/1
1 CAPSULE ORAL
COMMUNITY
End: 2023-11-06

## 2023-09-05 RX ORDER — OXYCODONE HYDROCHLORIDE AND ACETAMINOPHEN 5; 325 MG/1; MG/1
TABLET ORAL
COMMUNITY

## 2023-09-05 RX ORDER — TRAMADOL HYDROCHLORIDE 50 MG/1
TABLET ORAL
COMMUNITY
Start: 2023-06-20

## 2023-09-05 RX ORDER — ESOMEPRAZOLE MAGNESIUM 40 MG/1
1 CAPSULE, DELAYED RELEASE ORAL DAILY
COMMUNITY
End: 2023-09-05

## 2023-09-05 RX ORDER — TRAMADOL HYDROCHLORIDE 50 MG/1
TABLET ORAL
COMMUNITY
End: 2023-09-05

## 2023-09-05 RX ORDER — PANTOPRAZOLE SODIUM 40 MG/1
1 TABLET, DELAYED RELEASE ORAL DAILY
COMMUNITY
End: 2023-09-05

## 2023-09-05 RX ORDER — LAMOTRIGINE 100 MG/1
TABLET ORAL
COMMUNITY
End: 2023-09-05

## 2023-09-05 ASSESSMENT — FIBROSIS 4 INDEX: FIB4 SCORE: 0.51

## 2023-09-05 NOTE — LETTER
[email or web address]  Romelia Estrada P.A.-C.  [address]  [place] [address]  Fax: [phone]   Authorization for Release/Disclosure of   Protected Health Information   Name: AMIRAH SHEPARD : 1985 SSN: [id number]   Address: [address] [place] [address] [address]  [place] [address] Phone:    [phone] (home)    I authorize the entity listed below to release/disclose the PHI below to:   [email or web address] Estarda P.A.-C. and Romelia Estrada P.A.-C.   Provider or Entity Name:  Dr. Johnston     Address   City, [facility], [facility]   Phone:      Fax:     Reason for request: continuity of care   Information to be released:    [  ] LAST COLONOSCOPY,  including any PATH REPORT and follow-up  [  ] LAST FIT/COLOGUARD RESULT [  ] LAST DEXA  [  ] LAST MAMMOGRAM  [ x ] LAST PAP  [  ] LAST LABS [  ] RETINA EXAM REPORT  [  ] IMMUNIZATION RECORDS  [  ] Release all info      [  ] Check here and initial the line next to each item to release ALL health information INCLUDING  _____ Care and treatment for drug and / or alcohol abuse  _____ HIV testing, infection status, or AIDS  _____ Genetic Testing    DATES OF SERVICE OR TIME PERIOD TO BE DISCLOSED: _____________  I understand and acknowledge that:  * This Authorization may be revoked at any time by you in writing, except if your health information has already been used or disclosed.  * Your health information that will be used or disclosed as a result of you signing this authorization could be re-disclosed by the recipient. If this occurs, your re-disclosed health information may no longer be protected by State or Federal laws.  * You may refuse to sign this Authorization. Your refusal will not affect your ability to obtain treatment.  * This Authorization becomes effective upon signing and will  on (date) __________.      If no date is indicated, this Authorization will  one (1) year from the signature date.    Name: Amirah Shepard  Signature: Date:   2023      PLEASE FAX REQUESTED RECORDS BACK TO: [phone]

## 2023-09-05 NOTE — PROGRESS NOTES
Virtual Visit: Established Patient   This visit was conducted via Zoom using secure and encrypted videoconferencing technology.   The patient was in their home in the [facility].    The patient's identity was confirmed and verbal consent was obtained for this virtual visit.    Subjective:   CC:   Chief Complaint   Patient presents with    Gallbladder     Follow up Gallbladder surgery in July 2023.      Jennifer Gee is a 38 y.o. female presenting for evaluation and management of:    Follow up labs.  Labs indicate glucose is borderline and ldl is elevated.  Allergy testing is normal.  Prolactin is borderline.  Prolactin ordered by gyn.  Patient states that she has had a cholecystectomy.  Since having surgery, she is not able to eat fatty foods without becoming ill. She is okay with the side effect.  She states that she is still needing omeprazole and it does help.      Patient states that she has been having nipple discharge.  She did have a mammogram done.  Mammogram is benign. She will have an appointment in the next couple of months with Dr. Johnston.        ROS   Denies any other symptoms unless previously indicated.    Current medicines (including changes today)  Current Outpatient Medications   Medication Sig Dispense Refill    vitamin D2, Ergocalciferol, (DRISDOL) 1.25 MG (94458 UT) Cap capsule Take 1 Capsule by mouth every 7 days.      omeprazole (PRILOSEC) 20 MG delayed-release capsule Take 1 Capsule by mouth every day. 90 Capsule 2    traZODone (DESYREL) 100 MG Tab TAKE 1 TABLET BY MOUTH AT BEDTIME 90 Tablet 0    metformin (GLUCOPHAGE) 1000 MG tablet Take 1 Tablet by mouth 2 times a day with meals. 180 Tablet 2    tetracycline (SUMYCIN) 250 MG Cap Take 1 Capsule by mouth 2 times a day. 180 Capsule 1    lamotrigine (LAMICTAL) 150 MG tablet Take 1 Tablet by mouth every day. 90 Tablet 0    cyclobenzaprine (FLEXERIL) 10 mg Tab Take 1 Tablet by mouth 3 times a day as needed for Moderate Pain. 90 Tablet 1     ibuprofen (MOTRIN) 800 MG Tab Take 1 Tablet by mouth every 8 hours as needed for Moderate Pain. 90 Tablet 1    albuterol 108 (90 Base) MCG/ACT Aero Soln inhalation aerosol Inhale 2 Puffs every 6 hours as needed for Shortness of Breath. 18 g 4    Clonazepam 1 MG TABLET DISPERSIBLE       loratadine (CLARITIN) 10 MG Tab Take 1 Tablet by mouth every day. 30 Tablet 3    fluticasone (FLONASE) 50 MCG/ACT nasal spray Administer 2 Sprays into affected nostril(S) every day. 16 g 5    NON SPECIFIED Massage therapy for neuropathy. 1 Each 12    ondansetron (ZOFRAN ODT) 4 MG TABLET DISPERSIBLE Take 1 Tablet by mouth every 8 hours as needed. (Patient not taking: Reported on 9/5/2023)      traMADol (ULTRAM) 50 MG Tab TAKE 1 TABLET BY MOUTH EVERY 4 HOURS AS NEEDED FOR PAIN FOR 7 DAYS (Patient not taking: Reported on 9/5/2023)      oxyCODONE-acetaminophen (PERCOCET) 5-325 MG Tab TAKE 1 TABLET BY MOUTH EVERY 4 HOURS AS NEEDED FOR MODERATE PAIN FOR 5 DAYS (Patient not taking: Reported on 9/5/2023)      vitamin D2, Ergocalciferol, (DRISDOL) 1.25 MG (71516 UT) Cap capsule TAKE ONE CAPSULE BY MOUTH ONCE WEEKLY 12 Capsule 2     No current facility-administered medications for this visit.       Patient Active Problem List    Diagnosis Date Noted    Hyperglycemia 09/05/2023    Steatosis of liver 05/24/2023    Enlarged liver 05/24/2023    Epigastric pain 05/03/2023    Morbid obesity (HCC) 05/03/2023    Esophagitis 11/03/2022    Diverticulosis 11/03/2022    Hiatal hernia 11/03/2022    Other hemorrhoids 11/03/2022    Blood in stool 06/01/2022    PTSD (post-traumatic stress disorder) 06/01/2022    Body mass index 40.0-44.9, adult (HCC) 06/01/2022    Elevated LDL cholesterol level 02/15/2022    Seasonal allergies 08/19/2021    Pressure injury of contiguous region involving back and buttock, stage 2 (HCC) 05/20/2021    Other hyperlipidemia 07/27/2020    Vitamin D deficiency 07/27/2020    Sprain of left ankle 05/26/2020    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    Insomnia 09/23/2019    MELISSA (obstructive sleep apnea) 03/11/2019    Non-smoker 03/11/2019    Gastroesophageal reflux disease 03/11/2019    Nonspecific abnormal electromyogram (EMG) 03/06/2018    Mild intermittent asthma without complication 01/22/2018    Rash of back 10/12/2017    Acute recurrent sinusitis 09/21/2017    Bipolar affective disorder (HCC) 02/16/2016    PCOS (polycystic ovarian syndrome) 02/16/2016    Acne vulgaris 02/16/2016    Intractable migraine without aura and without status migrainosus 01/01/2014        Objective:   Resp 16   Ht 1.575 m (5' 2\") Comment: Pt stated  Wt 91.6 kg (202 lb) Comment: Pt stated  BMI 36.95 kg/m²     Physical Exam:  Constitutional: Alert, no distress, well-groomed.  Skin: No rashes in visible areas.  Eye: Round. Conjunctiva clear, lids normal. No icterus.   ENMT: Lips pink without lesions, good dentition, moist mucous membranes. Phonation normal.  Neck: No masses, no thyromegaly. Moves freely without pain.  Respiratory: Unlabored respiratory effort, no cough or audible wheeze  Psych: Alert and oriented x3, normal affect and mood.      Latest Reference Range & Units 08/12/23 10:07   Sodium 135 - 145 mmol/L 138   Potassium 3.6 - 5.5 mmol/L 4.3   Chloride 96 - 112 mmol/L 102   Co2 20 - 33 mmol/L 23   Anion Gap 7.0 - 16.0  13.0   Glucose 65 - 99 mg/dL 100 (H)   Bun 8 - 22 mg/dL 10   Creatinine 0.50 - 1.40 mg/dL 0.63   GFR (CKD-EPI) >60 mL/min/1.73 m 2 116   Calcium 8.5 - 10.5 mg/dL 9.8   Correct Calcium 8.5 - 10.5 mg/dL 9.4   AST(SGOT) 12 - 45 U/L 18   ALT(SGPT) 2 - 50 U/L 20   Alkaline Phosphatase 30 - 99 U/L 71   Total Bilirubin 0.1 - 1.5 mg/dL 0.4   Albumin 3.2 - 4.9 g/dL 4.5   Total Protein 6.0 - 8.2 g/dL 6.9   Globulin 1.9 - 3.5 g/dL 2.4   A-G Ratio g/dL 1.9   Fasting Status  Fasting   Cholesterol,Tot 100 - 199 mg/dL 178   Triglycerides 0 - 149 mg/dL 110   HDL >=40 mg/dL 30 !   LDL <100 mg/dL 126 (H)   TSH 0.380 - 5.330 uIU/mL 2.900 "   Glover, IgE <=0.34 kU/L <0.10   Asparagus  F261 <=0.34 kU/L <0.10   F138 Avocado <=0.34 kU/L <0.10   Baker/Anand Yeast IgE <=0.34 kU/L <0.10   F204 Banana <=0.34 kU/L <0.10   Basil  F269 <=0.34 kU/L <0.10   Bayleaf   F278 <=0.34 kU/L <0.10   F27 Beef <=0.34 kU/L <0.10   Beet Root, IgE <=0.34 kU/L <0.10   Black Pepper  F280 <=0.34 kU/L <0.10   Blueberry  F288 <=0.34 kU/L <0.10   Brazil Nut  F018 <=0.34 kU/L <0.10   Broccoli  F260 <=0.34 kU/L <0.10   Buckwheat  F011 <=0.34 kU/L <0.10   Cabbage F216 <=0.34 kU/L <0.10   F31 Carrot <=0.34 kU/L <0.10   Cashew Nut  F202 <=0.34 kU/L <0.10   Chick Pea  F309 <=0.34 kU/L <0.10   Chocolate Cacao, IgE F093 <=0.34 kU/L <0.10   Cinnamon  F220 <=0.34 kU/L <0.10   Clam  F207 <=0.34 kU/L <0.10   Coconut <=0.34 kU/L <0.10   Codfish Allergen <=0.34 kU/L <0.10   Crab  F023 <=0.34 kU/L <0.10   Cucumber  F244 <=0.34 kU/L <0.10   Dill  F277 <=0.34 kU/L <0.10   F1 Eggwhite <=0.34 kU/L <0.10   Linda  F270 <=0.34 kU/L <0.10   Grape  F259 <=0.34 kU/L <0.10   Halibut  F303 <=0.34 kU/L <0.10   Hazelnut/Filbert  F017 <=0.34 kU/L <0.10   Honeydew/Cantaloupe, IgE <=0.34 kU/L <0.10   Immunocap Score  See Note   Lettuce F215 <=0.34 kU/L <0.10   Mooney Brean/White Zepeda, IgE <=0.34 kU/L <0.10   F2 Milk <=0.34 kU/L <0.10   Mussel F037 <=0.34 kU/L <0.10   Occupational Cotton Seed, IgE <=0.34 kU/L <0.10   Onions  F048 <=0.34 kU/L <0.10   Orange F033 <=0.34 kU/L <0.10   Oregano  F283 <=0.34 kU/L <0.10   F12 Pea <=0.34 kU/L <0.10   F013 Peanut <=0.34 kU/L <0.10   Pear  F094 <=0.34 kU/L <0.10   Pepper C. annuum, IgE <=0.34 kU/L <0.10   Plum  F255 <=0.34 kU/L <0.10   Pork  F026 <=0.34 kU/L <0.10   Potato <=0.34 kU/L <0.10   Raspberry, IgE F343 <=0.34 kU/L <0.10   Sesame Seed <=0.34 kU/L <0.10   Shrimp IgE <=0.34 kU/L <0.10   F14 Bean Soybean <=0.34 kU/L <0.10   F299 Sweet Oakley <=0.34 kU/L <0.10   Tea  F222 <=0.34 kU/L <0.10   Tomato IgE <=0.34 kU/L <0.10   Trout  F204 <=0.34 kU/L <0.10   Turkey F284  <=0.34 kU/L <0.10   Baraga  F255 <=0.34 kU/L <0.10   Watermelon <=0.34 kU/L <0.10   Prolactin 2.80 - 26.00 ng/mL 26.30 (H)   (H): Data is abnormally high  !: Data is abnormal      Assessment and Plan:   The following treatment plan was discussed:     1. Elevated LDL cholesterol level  - Lipid Profile; Future  - Comp Metabolic Panel; Future  2. Other hyperlipidemia  - Lipid Profile; Future  - Comp Metabolic Panel; Future  3. Hyperglycemia  - HEMOGLOBIN A1C; Future    We will repeat labs in 6 months and she will contact us sooner with any issues or concerns.    4. Gastroesophageal reflux disease without esophagitis  - omeprazole (PRILOSEC) 20 MG delayed-release capsule; Take 1 Capsule by mouth every day.  Dispense: 90 Capsule; Refill: 2    Medication refilled.    Other orders  - ondansetron (ZOFRAN ODT) 4 MG TABLET DISPERSIBLE; Take 1 Tablet by mouth every 8 hours as needed. (Patient not taking: Reported on 9/5/2023)  - traMADol (ULTRAM) 50 MG Tab; TAKE 1 TABLET BY MOUTH EVERY 4 HOURS AS NEEDED FOR PAIN FOR 7 DAYS (Patient not taking: Reported on 9/5/2023)  - oxyCODONE-acetaminophen (PERCOCET) 5-325 MG Tab; TAKE 1 TABLET BY MOUTH EVERY 4 HOURS AS NEEDED FOR MODERATE PAIN FOR 5 DAYS (Patient not taking: Reported on 9/5/2023)  - vitamin D2, Ergocalciferol, (DRISDOL) 1.25 MG (07914 UT) Cap capsule; Take 1 Capsule by mouth every 7 days.      Follow-up: No follow-ups on file.

## 2023-09-29 ENCOUNTER — NON-PROVIDER VISIT (OUTPATIENT)
Dept: NEUROLOGY | Facility: MEDICAL CENTER | Age: 38
End: 2023-09-29
Attending: SPECIALIST
Payer: COMMERCIAL

## 2023-09-29 DIAGNOSIS — G60.0 CHARCOT-MARIE-TOOTH DISEASE OF NEURONAL TYPE: ICD-10-CM

## 2023-09-29 PROCEDURE — 95886 MUSC TEST DONE W/N TEST COMP: CPT | Performed by: SPECIALIST

## 2023-09-29 PROCEDURE — 95886 MUSC TEST DONE W/N TEST COMP: CPT | Mod: 26 | Performed by: SPECIALIST

## 2023-09-29 PROCEDURE — 95910 NRV CNDJ TEST 7-8 STUDIES: CPT | Mod: 26 | Performed by: SPECIALIST

## 2023-09-29 PROCEDURE — 95910 NRV CNDJ TEST 7-8 STUDIES: CPT | Performed by: SPECIALIST

## 2023-09-29 NOTE — PROCEDURES
NERVE CONDUCTION STUDIES AND ELECTROMYOGRAPHY REPORT        09/29/23      Referring provider: Romelia Estrada P.A.-C.      SUMMARY OF PATIENT'S CLINICAL HISTORY,PHYSICAL EXAM, AND RATIONALE FOR TESTING:    Ms. Jennifer Gee 38 y.o. 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    Past Medical History is significant for :   Past Medical History:   Diagnosis Date    Bladder infection 2012    Borderline personality disorder (HCC)     cutter    Chickenpox     COVID-19     COVID-19 9/21/2021    Depression     Gastritis     History of dental surgery     Influenza     Obesity     Pneumonia 2007    Restless leg syndrome        The electrodiagnostic studies were performed to evaluate for possible consideration of the degree of progression of the neuropathy.      ELECTRODIAGNOSTIC EXAMINATION:  Nerve conduction studies (NCS) and electromyography (EMG) are utilized to evaluate direct or indirect damage to the peripheral nervous system. NCS are performed to measure the nerve(s) response(s) to electrostimulation across a given nerve segment. EMG evaluates the passive and active electrical activity of the muscle(s) in question.  Muscles are innervated by specific peripheral nerves and roots. Often times, several nerves the muscle to be examined in order to determine the presence or absence of the disease process. Furthermore, nerves and muscles may need to be tested in a [id number] comparison, as well as in additional extremities, as this may be crucial in characterizing the extent of the disease process, which may be diffuse or isolated and of varying degree of severity. The extent of the neurodiagnostic exam is justified as it may help arrive to a proper diagnosis, which ultimately may  contribute to better management of the patient. Therefore, the nerves to muscles examined during the study were medically necessary.    Unless otherwise noted, temperature of the extremity(s) study was monitored before and during the examination and remained between 32 and 36 degrees C for the upper extremities, and between 30 and 36 degrees C for the lower extremities.      NERVE CONDUCTION STUDIES:  Sensory nerves:   -Right median sensory nerve was examined.The response was not elicitable with antidromic stimuli.  -Right ulnar sensory nerve was examined. The response was not elicitable with antidromic stimuli.  Motor nerves:   -Right median motor nerve was examined. Recording electrodes placed at the Abductor Pollicis Brevis muscles. The response was abnormal, onset latency was prolonged at 8.4 ms, amplitude was normal and conduction velocity was slowed to 24 m/s.  -Right ulnar motor nerve was examined. Recording electrodes placed at the Abductor Digiti Minimi muscles. The responses was abnormal, onset latency was prolonged at 6.4 ms, amplitude was within normal limits and conduction velocity was slowed to 28 m/s distally and 20 m/s across the elbow.  - Bilateral Tibial nerves were examined. Recording electrodes placed at the Abductor Hallucis muscles. The responses were abnormal bilaterally, on the right onset latency was prolonged at 7.7 ms, amplitude was decreased to 0.5 mV distally with a dispersed attenuated waveform and the proximal response was not elicitable.  On the left onset latency was prolonged at 9.3 ms, amplitude was decreased to 0.4 mV with a dispersed attenuated waveform distally and a proximal response could not be elicited.  - Bilateral Deep Peroneal motor nerves were examined. Recording electrodes were placed at the Extensor Digitorum Brevis muscles.The responses elicitable with either proximal or distal stimuli bilaterally.        ELECTROMYOGRAPHY:  The study was performed the concentric  needle electrode. Fibrillation and fasciculation activity is graded by convention from none (0) to continuous (4+).  Needle electrode examination was performed in the following muscles: Right deltoid, biceps, triceps, first dorsal interosseous and abductor pollicis brevis.  Right vastus lateralis, tibialis anterior, gastrocnemius, extensor digitorum brevis, abductor hallucis.  No acute denervation changes were noted in either the right upper or lower extremity, there was no increased insertional activity fibrillation potentials or positive sharp waves.  Chronic neuropathic changes with decreased recruitment was noted in the first dorsal interosseous and abductor pollicis brevis in the upper extremity and in the tibialis anterior gastrocnemius extensor digitorum brevis and abductor hallucis in the lower extremity.      Nerve Conduction Studies     Stim Site NR Onset (ms) Norm Onset (ms) O-P Amp (µV) Norm O-P Amp Site1 Site2 Delta-P (ms) Dist (cm) Clark (m/s) Norm Clark (m/s)   Right Median Anti Sensory (2nd Digit)   Wrist *NR  <3.4  >20 Wrist 2nd Digit  14.0  >50   Right Ulnar Anti Sensory (5th Digit)   Wrist *NR  <3.1  >12 Wrist 5th Digit  14.0  >50        Stim Site NR Onset (ms) Norm Onset (ms) O-P Amp (mV) Norm O-P Amp Site1 Site2 Delta-0 (ms) Dist (cm) Clark (m/s) Norm Clark (m/s)   Right Median Motor (Abd Poll Brev)   Wrist    *8.4 <3.9 7.7 >6 Elbow Wrist 8.3 20.0 *24 >50   Elbow    16.7  7.1          Left Peroneal EDB Motor (Ext Dig Brev)   Ankle *NR  <5.5  >3.0 B Fib Ankle  0.0  >40   B Fib *NR     Poplt B Fib  0.0     Poplt *NR             Right Peroneal EDB Motor (Ext Dig Brev)   Ankle *NR  <5.5  >3.0 B Fib Ankle  0.0  >40   B Fib *NR     Poplt B Fib  0.0     Poplt    13.8  0.1          Left Tibial Motor (Abd Zhu Brev)   Ankle    *9.3 <6 *0.4 >8 Knee Ankle  0.0  >40   Knee *NR             Right Tibial Motor (Abd Zhu Brev)   Ankle    *7.7 <6 *0.5 >8 Knee Ankle  0.0  >40   Knee *NR             Right Ulnar Motor (Abd  Dig Min)   Wrist    *6.4 <3.1 7.3 >7 B Elbow Wrist 6.4 18.0 *28 >50   B Elbow    12.8  5.0  A Elbow B Elbow 5.1 10.0 20    A Elbow    17.9  3.4                                Electromyography     Side Muscle Nerve Root Ins Act Fibs Psw Amp Dur Poly Recrt Int Pat Comment   Right Deltoid Axillary C5-6 Nml Nml Nml Nml Nml 0 Nml Nml    Right Biceps Musculocut C5-6 Nml Nml Nml Nml Nml 0 Nml Nml    Right Triceps Radial C6-7-8 Nml Nml Nml Nml Nml 0 Nml Nml    Right 1stDorInt Ulnar C8-T1 Nml Nml Nml Nml Nml 0 *Reduced *75%    Right Abd Poll Brev Median C8-T1 Nml Nml Nml Nml Nml 0 *Reduced *75%    Right VastusLat Femoral L2-4 Nml Nml Nml Nml Nml 0 Nml Nml    Right AntTibialis Dp Br Fibular L4-5 Nml Nml Nml *Incr Nml 0 *Reduced *75%    Right Gastroc Tibial S1-2 Nml Nml Nml Nml Nml 0 *Reduced *50%    Right Ext Dig Brev Dp Br Fibular L5, S1 Nml Nml Nml Nml *>12ms 0 *Reduced *25%    Right AbdHallucis MedPlantar S1-2 Nml Nml Nml Nml Nml 0 *Reduced *25%            DIAGNOSTIC INTERPRETATION:   Extensive electrodiagnostic studies were performed to the extremity and bilateral lower extremities.  The results are as follows:    1.  Evidence of  prominent neuropathy affecting the right median and ulnar motor nerves.    2.  Absent right median and ulnar sensory responses.    3.  Absent bilateral peroneal motor conduction studies.    4.  Prominent demyelinating and axonal changes affecting the bilateral tibial motor nerves.    5.  Chronic neuropathic changes with decreased recruitment in multiple distal upper and lower extremity muscles in the right upper and lower extremity without acute denervation changes.      CLINICAL DISCUSSION:   Taken as a whole these results are consistent with a predominantly demyelinating neuropathy with a component of an axonal neuropathy in the lower extremity.  This is consistent with a Charcot-Michelle-Tooth type of neuropathy.  Compared to the patient's prior study in 2018 she had improved amplitude in both  the right median and ulnar nerve motor conduction studies and currently had elicitable distal tibial nerve responses bilaterally which she did not have in 2018.  This is likely a reflection of improved technology over that period of time.  Clinical correlation is suggested.      DEBORAH Mishra M.D. (No note.)

## 2023-11-06 ENCOUNTER — TELEMEDICINE (OUTPATIENT)
Dept: MEDICAL GROUP | Facility: CLINIC | Age: 38
End: 2023-11-06
Payer: COMMERCIAL

## 2023-11-06 VITALS — RESPIRATION RATE: 16 BRPM | BODY MASS INDEX: 36.8 KG/M2 | HEIGHT: 62 IN | WEIGHT: 200 LBS

## 2023-11-06 DIAGNOSIS — G89.29 CHRONIC PAIN OF RIGHT KNEE: ICD-10-CM

## 2023-11-06 DIAGNOSIS — M25.561 CHRONIC PAIN OF RIGHT KNEE: ICD-10-CM

## 2023-11-06 DIAGNOSIS — M25.551 PAIN OF RIGHT HIP: ICD-10-CM

## 2023-11-06 DIAGNOSIS — R79.89 ELEVATED PROLACTIN LEVEL: ICD-10-CM

## 2023-11-06 PROCEDURE — 99214 OFFICE O/P EST MOD 30 MIN: CPT | Mod: 95 | Performed by: PHYSICIAN ASSISTANT

## 2023-11-06 ASSESSMENT — FIBROSIS 4 INDEX: FIB4 SCORE: 0.51

## 2023-11-06 NOTE — PROGRESS NOTES
"Virtual Visit: Established Patient   This visit was conducted via Zoom using secure and encrypted videoconferencing technology.   The patient was in their home in the state of [place].    The patient's identity was confirmed and verbal consent was obtained for this virtual visit.    Subjective:   CC:   Chief Complaint   Patient presents with    Hip Pain     Pt c/o L hip and leg pain, weakness and buckling in the knee      Jennifer Gee is a 38 y.o. female presenting for evaluation and management of:    Hip and leg pain.  Patient states that she is having knee buckling and it has been going on for the last 2 weeks.  She states that her father has a \"disease\" where the hip joint will wear away.  She states that she is also concerned about the charcot candy tooth wearing away her knee joint.      She has also had a nerve conduction study and wanted to discuss the results further.  She does have demyelination of the nerves but the test also shows some improvement.      Patient states that she is needing further prolactin testing.  She was seeing Dr. Johnston who is no longer practicing. He was going to re-evaluate the prolactin and decide of MRI was needed based on what patient is saying.      ROS   Denies any other symptoms unless previously indicated.    Current medicines (including changes today)  Current Outpatient Medications   Medication Sig Dispense Refill    omeprazole (PRILOSEC) 20 MG delayed-release capsule Take 1 Capsule by mouth every day. 90 Capsule 2    traZODone (DESYREL) 100 MG Tab TAKE 1 TABLET BY MOUTH AT BEDTIME 90 Tablet 0    metformin (GLUCOPHAGE) 1000 MG tablet Take 1 Tablet by mouth 2 times a day with meals. 180 Tablet 2    tetracycline (SUMYCIN) 250 MG Cap Take 1 Capsule by mouth 2 times a day. 180 Capsule 1    vitamin D2, Ergocalciferol, (DRISDOL) 1.25 MG (24790 UT) Cap capsule TAKE ONE CAPSULE BY MOUTH ONCE WEEKLY 12 Capsule 2    lamotrigine (LAMICTAL) 150 MG tablet Take 1 Tablet by mouth " every day. 90 Tablet 0    cyclobenzaprine (FLEXERIL) 10 mg Tab Take 1 Tablet by mouth 3 times a day as needed for Moderate Pain. 90 Tablet 1    ibuprofen (MOTRIN) 800 MG Tab Take 1 Tablet by mouth every 8 hours as needed for Moderate Pain. 90 Tablet 1    albuterol 108 (90 Base) MCG/ACT Aero Soln inhalation aerosol Inhale 2 Puffs every 6 hours as needed for Shortness of Breath. 18 g 4    Clonazepam 1 MG TABLET DISPERSIBLE       loratadine (CLARITIN) 10 MG Tab Take 1 Tablet by mouth every day. 30 Tablet 3    traMADol (ULTRAM) 50 MG Tab TAKE 1 TABLET BY MOUTH EVERY 4 HOURS AS NEEDED FOR PAIN FOR 7 DAYS (Patient not taking: Reported on 9/5/2023)      oxyCODONE-acetaminophen (PERCOCET) 5-325 MG Tab TAKE 1 TABLET BY MOUTH EVERY 4 HOURS AS NEEDED FOR MODERATE PAIN FOR 5 DAYS (Patient not taking: Reported on 9/5/2023)      fluticasone (FLONASE) 50 MCG/ACT nasal spray Administer 2 Sprays into affected nostril(S) every day. (Patient not taking: Reported on 11/6/2023) 16 g 5    NON SPECIFIED Massage therapy for neuropathy. 1 Each 12     No current facility-administered medications for this visit.       Patient Active Problem List    Diagnosis Date Noted    Elevated prolactin level 11/06/2023    Chronic pain of right knee 11/06/2023    Pain of right hip 11/06/2023    Hyperglycemia 09/05/2023    Steatosis of liver 05/24/2023    Enlarged liver 05/24/2023    Epigastric pain 05/03/2023    Morbid obesity (HCC) 05/03/2023    Esophagitis 11/03/2022    Diverticulosis 11/03/2022    Hiatal hernia 11/03/2022    Other hemorrhoids 11/03/2022    Blood in stool 06/01/2022    PTSD (post-traumatic stress disorder) 06/01/2022    Body mass index 40.0-44.9, adult (HCC) 06/01/2022    Elevated LDL cholesterol level 02/15/2022    Seasonal allergies 08/19/2021    Pressure injury of contiguous region involving back and buttock, stage 2 (HCC) 05/20/2021    Other hyperlipidemia 07/27/2020    Vitamin D deficiency 07/27/2020    Sprain of left ankle  "05/26/2020    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    Insomnia 09/23/2019    MELISSA (obstructive sleep apnea) 03/11/2019    Non-smoker 03/11/2019    Gastroesophageal reflux disease 03/11/2019    Nonspecific abnormal electromyogram (EMG) 03/06/2018    Mild intermittent asthma without complication 01/22/2018    Rash of back 10/12/2017    Acute recurrent sinusitis 09/21/2017    Bipolar affective disorder (HCC) 02/16/2016    PCOS (polycystic ovarian syndrome) 02/16/2016    Acne vulgaris 02/16/2016    Intractable migraine without aura and without status migrainosus 01/01/2014        Objective:   Resp 16   Ht 1.575 m (5' 2\") Comment: Pt stated  Wt 90.7 kg (200 lb) Comment: Pt stated  LMP 10/23/2023 (Within Days)   BMI 36.58 kg/m²     Physical Exam:  Constitutional: Alert, no distress, well-groomed.  Skin: No rashes in visible areas.  Eye: Round. Conjunctiva clear, lids normal. No icterus.   ENMT: Lips pink without lesions, good dentition, moist mucous membranes. Phonation normal.  Neck: No masses, no thyromegaly. Moves freely without pain.  Respiratory: Unlabored respiratory effort, no cough or audible wheeze  Psych: Alert and oriented x3, normal affect and mood.     Assessment and Plan:   The following treatment plan was discussed:     1. Elevated prolactin level  - MR-BRAIN PITUITARY W/WO; Future  - PROLACTIN; Future    2. Chronic pain of right knee  - DX-KNEE 3 VIEWS Atraumatic Pain/Swelling/Deformity; Future    3. Pain of right hip  - DX-HIP-COMPLETE - UNILATERAL 2+ RIGHT; Future      We will order the prolactin level to recheck levels as well as an MRI of the pituitary gland.  We will obtain x-rays of the right hip and knee.  I did verify with patient that this is right side.  We will follow-up in office for further discussion of test results.    Follow-up: No follow-ups on file.         "

## 2023-11-08 ENCOUNTER — HOSPITAL ENCOUNTER (OUTPATIENT)
Dept: LAB | Facility: MEDICAL CENTER | Age: 38
End: 2023-11-08
Attending: PHYSICIAN ASSISTANT
Payer: COMMERCIAL

## 2023-11-08 DIAGNOSIS — R79.89 ELEVATED PROLACTIN LEVEL: ICD-10-CM

## 2023-11-08 DIAGNOSIS — E78.00 ELEVATED LDL CHOLESTEROL LEVEL: ICD-10-CM

## 2023-11-08 DIAGNOSIS — R73.9 HYPERGLYCEMIA: ICD-10-CM

## 2023-11-08 DIAGNOSIS — E78.49 OTHER HYPERLIPIDEMIA: ICD-10-CM

## 2023-11-08 LAB
ALBUMIN SERPL BCP-MCNC: 4.4 G/DL (ref 3.2–4.9)
ALBUMIN/GLOB SERPL: 1.8 G/DL
ALP SERPL-CCNC: 68 U/L (ref 30–99)
ALT SERPL-CCNC: 20 U/L (ref 2–50)
ANION GAP SERPL CALC-SCNC: 12 MMOL/L (ref 7–16)
AST SERPL-CCNC: 17 U/L (ref 12–45)
BILIRUB SERPL-MCNC: 0.3 MG/DL (ref 0.1–1.5)
BUN SERPL-MCNC: 12 MG/DL (ref 8–22)
CALCIUM ALBUM COR SERPL-MCNC: 8.6 MG/DL (ref 8.5–10.5)
CALCIUM SERPL-MCNC: 8.9 MG/DL (ref 8.5–10.5)
CHLORIDE SERPL-SCNC: 106 MMOL/L (ref 96–112)
CHOLEST SERPL-MCNC: 185 MG/DL (ref 100–199)
CO2 SERPL-SCNC: 22 MMOL/L (ref 20–33)
CREAT SERPL-MCNC: 0.58 MG/DL (ref 0.5–1.4)
EST. AVERAGE GLUCOSE BLD GHB EST-MCNC: 103 MG/DL
FASTING STATUS PATIENT QL REPORTED: NORMAL
GFR SERPLBLD CREATININE-BSD FMLA CKD-EPI: 119 ML/MIN/1.73 M 2
GLOBULIN SER CALC-MCNC: 2.4 G/DL (ref 1.9–3.5)
GLUCOSE SERPL-MCNC: 89 MG/DL (ref 65–99)
HBA1C MFR BLD: 5.2 % (ref 4–5.6)
HDLC SERPL-MCNC: 29 MG/DL
LDLC SERPL CALC-MCNC: 126 MG/DL
POTASSIUM SERPL-SCNC: 4 MMOL/L (ref 3.6–5.5)
PROLACTIN SERPL-MCNC: 12.3 NG/ML (ref 2.8–26)
PROT SERPL-MCNC: 6.8 G/DL (ref 6–8.2)
SODIUM SERPL-SCNC: 140 MMOL/L (ref 135–145)
TRIGL SERPL-MCNC: 150 MG/DL (ref 0–149)

## 2023-11-08 PROCEDURE — 83036 HEMOGLOBIN GLYCOSYLATED A1C: CPT

## 2023-11-08 PROCEDURE — 84146 ASSAY OF PROLACTIN: CPT

## 2023-11-08 PROCEDURE — 80061 LIPID PANEL: CPT

## 2023-11-08 PROCEDURE — 36415 COLL VENOUS BLD VENIPUNCTURE: CPT

## 2023-11-08 PROCEDURE — 80053 COMPREHEN METABOLIC PANEL: CPT

## 2023-11-10 ENCOUNTER — HOSPITAL ENCOUNTER (OUTPATIENT)
Dept: RADIOLOGY | Facility: MEDICAL CENTER | Age: 38
End: 2023-11-10
Attending: PHYSICIAN ASSISTANT
Payer: COMMERCIAL

## 2023-11-10 DIAGNOSIS — G89.29 CHRONIC PAIN OF RIGHT KNEE: ICD-10-CM

## 2023-11-10 DIAGNOSIS — M25.551 PAIN OF RIGHT HIP: ICD-10-CM

## 2023-11-10 DIAGNOSIS — M25.561 CHRONIC PAIN OF RIGHT KNEE: ICD-10-CM

## 2023-11-10 PROCEDURE — 73562 X-RAY EXAM OF KNEE 3: CPT | Mod: RT

## 2023-11-10 PROCEDURE — 73502 X-RAY EXAM HIP UNI 2-3 VIEWS: CPT | Mod: RT

## 2023-12-11 DIAGNOSIS — L70.0 ACNE VULGARIS: ICD-10-CM

## 2023-12-12 RX ORDER — TETRACYCLINE HYDROCHLORIDE 250 MG/1
250 CAPSULE ORAL 2 TIMES DAILY
Qty: 180 CAPSULE | Refills: 0 | Status: SHIPPED | OUTPATIENT
Start: 2023-12-12 | End: 2024-03-20

## 2023-12-12 NOTE — TELEPHONE ENCOUNTER
Was the patient seen in the last year in this department? Yes    Does patient have an active prescription for medications requested? Yes    Received Request Via: Pharmacy    Hospital Outpatient Visit on 11/08/2023   Component Date Value    Prolactin 11/08/2023 12.30     Sodium 11/08/2023 140     Potassium 11/08/2023 4.0     Chloride 11/08/2023 106     Co2 11/08/2023 22     Anion Gap 11/08/2023 12.0     Glucose 11/08/2023 89     Bun 11/08/2023 12     Creatinine 11/08/2023 0.58     Calcium 11/08/2023 8.9     Correct Calcium 11/08/2023 8.6     AST(SGOT) 11/08/2023 17     ALT(SGPT) 11/08/2023 20     Alkaline Phosphatase 11/08/2023 68     Total Bilirubin 11/08/2023 0.3     Albumin 11/08/2023 4.4     Total Protein 11/08/2023 6.8     Globulin 11/08/2023 2.4     A-G Ratio 11/08/2023 1.8     Glycohemoglobin 11/08/2023 5.2     Est Avg Glucose 11/08/2023 103     Cholesterol,Tot 11/08/2023 185     Triglycerides 11/08/2023 150 (H)     HDL 11/08/2023 29 (A)     LDL 11/08/2023 126 (H)     Fasting Status 11/08/2023 Fasting     GFR (CKD-EPI) 11/08/2023 119    Hospital Outpatient Visit on 08/12/2023   Component Date Value    [place], IgE 08/12/2023 <0.10     Asparagus  F261 08/12/2023 <0.10     F138 Avocado 08/12/2023 <0.10     Baker/Anand Yeast IgE 08/12/2023 <0.10     F204 Banana 08/12/2023 <0.10     Basil  F269 08/12/2023 <0.10     [place]   F278 08/12/2023 <0.10     F27 Beef 08/12/2023 <0.10     Beet Root, IgE 08/12/2023 <0.10     Pepper C. annuum, IgE 08/12/2023 <0.10     Black Pepper  F280 08/12/2023 <0.10     Mussel F037 08/12/2023 <0.10     Blueberry  F288 08/12/2023 <0.10     Brazil Nut  F018 08/12/2023 <0.10     Broccoli  F260 08/12/2023 <0.10     Buckwheat  F011 08/12/2023 <0.10     Cabbage F216 08/12/2023 <0.10     F31 Carrot 08/12/2023 <0.10     Cashew Nut  F202 08/12/2023 <0.10     F299 Sweet [place] 08/12/2023 <0.10     Chick Pea  F309 08/12/2023 <0.10     Chocolate Cacao, IgE F093 08/12/2023 <0.10     Cinnamon  F220  08/12/2023 <0.10     Clam  F207 08/12/2023 <0.10     Coconut 08/12/2023 <0.10     Codfish Allergen 08/12/2023 <0.10     Occupational Cotton Seed* 08/12/2023 <0.10     Crab  F023 08/12/2023 <0.10     [place]ler  F244 08/12/2023 <0.10     Dill  F277 08/12/2023 <0.10     F1 Eggwhite 08/12/2023 <0.10     Linda  F270 08/12/2023 <0.10     Grape Allergen 08/12/2023 <0.10     Halibut  F303 08/12/2023 <0.10     Hazelnut/Filbert  F017 08/12/2023 <0.10     Honeydew/Cantaloupe, IgE 08/12/2023 <0.10     Lettuce F215 08/12/2023 <0.10     Mooney Brean/White Zepeda, I* 08/12/2023 <0.10     F2 Milk 08/12/2023 <0.10     Onions  F048 08/12/2023 <0.10     Orange F033 08/12/2023 <0.10     Oregano  F283 08/12/2023 <0.10     F12 Pea 08/12/2023 <0.10     F013 Peanut 08/12/2023 <0.10     Pear  F094 08/12/2023 <0.10     [place]  F255 08/12/2023 <0.10     Pork  F026 08/12/2023 <0.10     Potato 08/12/2023 <0.10     Raspberry, IgE F343 08/12/2023 <0.10     Sesame Seed 08/12/2023 <0.10     Shrimp IgE 08/12/2023 <0.10     F14 Zepeda Soybean 08/12/2023 <0.10     Tea  F222 08/12/2023 <0.10     Tomato IgE 08/12/2023 <0.10     Trout  F204 08/12/2023 <0.10     Turkey F284 08/12/2023 <0.10     [place]  F255 08/12/2023 <0.10     Watermelon 08/12/2023 <0.10     Immunocap Score 08/12/2023 See Note     Sodium 08/12/2023 138     Potassium 08/12/2023 4.3     Chloride 08/12/2023 102     Co2 08/12/2023 23     Anion Gap 08/12/2023 13.0     Glucose 08/12/2023 100 (H)     Bun 08/12/2023 10     Creatinine 08/12/2023 0.63     Calcium 08/12/2023 9.8     Correct Calcium 08/12/2023 9.4     AST(SGOT) 08/12/2023 18     ALT(SGPT) 08/12/2023 20     Alkaline Phosphatase 08/12/2023 71     Total Bilirubin 08/12/2023 0.4     Albumin 08/12/2023 4.5     Total Protein 08/12/2023 6.9     Globulin 08/12/2023 2.4     A-G Ratio 08/12/2023 1.9     Cholesterol,Tot 08/12/2023 178     Triglycerides 08/12/2023 110     HDL 08/12/2023 30 (A)     LDL 08/12/2023 126 (H)     Fasting Status 08/12/2023  Fasting     GFR (CKD-EPI) 08/12/2023 116    Hospital Outpatient Visit on 08/12/2023   Component Date Value    Prolactin 08/12/2023 26.30 (H)     TSH 08/12/2023 2.900    Admission on 06/09/2023, Discharged on 06/09/2023   Component Date Value    WBC 06/09/2023 9.4     RBC 06/09/2023 5.50 (H)     Hemoglobin 06/09/2023 15.5     Hematocrit 06/09/2023 46.3     MCV 06/09/2023 84.2     MCH 06/09/2023 28.2     MCHC 06/09/2023 33.5     RDW 06/09/2023 42.9     Platelet Count 06/09/2023 299     MPV 06/09/2023 9.8     Neutrophils-Polys 06/09/2023 69.00     Lymphocytes 06/09/2023 22.90     Monocytes 06/09/2023 5.70     Eosinophils 06/09/2023 1.70     Basophils 06/09/2023 0.40     Immature Granulocytes 06/09/2023 0.30     Nucleated RBC 06/09/2023 0.00     Neutrophils (Absolute) 06/09/2023 6.46     Lymphs (Absolute) 06/09/2023 2.15     Monos (Absolute) 06/09/2023 0.53     Eos (Absolute) 06/09/2023 0.16     Baso (Absolute) 06/09/2023 0.04     Immature Granulocytes (a* 06/09/2023 0.03     NRBC (Absolute) 06/09/2023 0.00     Sodium 06/09/2023 135     Potassium 06/09/2023 3.7     Chloride 06/09/2023 99     Co2 06/09/2023 20     Anion Gap 06/09/2023 16.0     Glucose 06/09/2023 92     Bun 06/09/2023 12     Creatinine 06/09/2023 0.68     Calcium 06/09/2023 9.4     AST(SGOT) 06/09/2023 16     ALT(SGPT) 06/09/2023 29     Alkaline Phosphatase 06/09/2023 74     Total Bilirubin 06/09/2023 0.4     Albumin 06/09/2023 4.5     Total Protein 06/09/2023 7.1     Globulin 06/09/2023 2.6     A-G Ratio 06/09/2023 1.7     Lipase 06/09/2023 21     Beta-Hcg Qualitative Ser* 06/09/2023 Negative     Correct Calcium 06/09/2023 9.0     GFR (CKD-EPI) 06/09/2023 114    Hospital Outpatient Visit on 05/04/2023   Component Date Value    Significant Indicator 05/04/2023 NEG     Source 05/04/2023 UR     Site 05/04/2023 URINE, CLEAN CATCH     Culture Result 05/04/2023 Usual urogenital cody 10-50,000 cfu/mL     Lipase 05/04/2023 23     Amylase 05/04/2023 34     TSH  05/04/2023 2.900     Sodium 05/04/2023 139     Potassium 05/04/2023 4.0     Chloride 05/04/2023 100     Co2 05/04/2023 22     Anion Gap 05/04/2023 17.0 (H)     Glucose 05/04/2023 76     Bun 05/04/2023 12     Creatinine 05/04/2023 0.62     Calcium 05/04/2023 9.4     AST(SGOT) 05/04/2023 20     ALT(SGPT) 05/04/2023 34     Alkaline Phosphatase 05/04/2023 76     Total Bilirubin 05/04/2023 0.4     Albumin 05/04/2023 4.7     Total Protein 05/04/2023 7.3     Globulin 05/04/2023 2.6     A-G Ratio 05/04/2023 1.8     25-Hydroxy   Vitamin D 25 05/04/2023 61     Glycohemoglobin 05/04/2023 5.3     Est Avg Glucose 05/04/2023 105     Cholesterol,Tot 05/04/2023 201 (H)     Triglycerides 05/04/2023 183 (H)     HDL 05/04/2023 31 (A)     LDL 05/04/2023 133 (H)     Fasting Status 05/04/2023 Fasting     Color 05/04/2023 Yellow     Character 05/04/2023 Clear     Specific Gravity 05/04/2023 1.005     Ph 05/04/2023 6.5     Glucose 05/04/2023 Negative     Ketones 05/04/2023 Negative     Protein 05/04/2023 Negative     Bilirubin 05/04/2023 Negative     Urobilinogen, Urine 05/04/2023 0.2     Nitrite 05/04/2023 Negative     Leukocyte Esterase 05/04/2023 Negative     Occult Blood 05/04/2023 Negative     Micro Urine Req 05/04/2023 see below     Correct Calcium 05/04/2023 8.8     GFR (CKD-EPI) 05/04/2023 117    ]

## 2023-12-19 DIAGNOSIS — S93.402A SPRAIN OF LEFT ANKLE, UNSPECIFIED LIGAMENT, INITIAL ENCOUNTER: ICD-10-CM

## 2023-12-19 RX ORDER — IBUPROFEN 800 MG/1
800 TABLET ORAL EVERY 8 HOURS PRN
Qty: 40 TABLET | Refills: 0 | Status: SHIPPED | OUTPATIENT
Start: 2023-12-19 | End: 2024-03-20

## 2023-12-20 NOTE — TELEPHONE ENCOUNTER
Requested Prescriptions     Pending Prescriptions Disp Refills    ibuprofen (MOTRIN) 800 MG Tab [Pharmacy Med Name: Ibuprofen 800 MG Oral Tablet] 40 Tablet 0     Sig: TAKE 1 TABLET BY MOUTH EVERY 8 HOURS AS NEEDED FOR MODERATE PAIN      Last office visit: 11/6/23  Last lab: 11/8/23

## 2024-01-01 DIAGNOSIS — E28.2 PCOS (POLYCYSTIC OVARIAN SYNDROME): ICD-10-CM

## 2024-01-03 NOTE — TELEPHONE ENCOUNTER
Was the patient seen in the last year in this department? Yes    Does patient have an active prescription for medications requested? Yes    Received Request Via: Pharmacy    Hospital Outpatient Visit on 11/08/2023   Component Date Value    Prolactin 11/08/2023 12.30     Sodium 11/08/2023 140     Potassium 11/08/2023 4.0     Chloride 11/08/2023 106     Co2 11/08/2023 22     Anion Gap 11/08/2023 12.0     Glucose 11/08/2023 89     Bun 11/08/2023 12     Creatinine 11/08/2023 0.58     Calcium 11/08/2023 8.9     Correct Calcium 11/08/2023 8.6     AST(SGOT) 11/08/2023 17     ALT(SGPT) 11/08/2023 20     Alkaline Phosphatase 11/08/2023 68     Total Bilirubin 11/08/2023 0.3     Albumin 11/08/2023 4.4     Total Protein 11/08/2023 6.8     Globulin 11/08/2023 2.4     A-G Ratio 11/08/2023 1.8     Glycohemoglobin 11/08/2023 5.2     Est Avg Glucose 11/08/2023 103     Cholesterol,Tot 11/08/2023 185     Triglycerides 11/08/2023 150 (H)     HDL 11/08/2023 29 (A)     LDL 11/08/2023 126 (H)     Fasting Status 11/08/2023 Fasting     GFR (CKD-EPI) 11/08/2023 119    Hospital Outpatient Visit on 08/12/2023   Component Date Value    [place], IgE 08/12/2023 <0.10     Asparagus  F261 08/12/2023 <0.10     F138 Avocado 08/12/2023 <0.10     Baker/Anand Yeast IgE 08/12/2023 <0.10     F204 Banana 08/12/2023 <0.10     Basil  F269 08/12/2023 <0.10     [place]   F278 08/12/2023 <0.10     F27 Beef 08/12/2023 <0.10     Beet Root, IgE 08/12/2023 <0.10     Pepper C. annuum, IgE 08/12/2023 <0.10     Black Pepper  F280 08/12/2023 <0.10     Mussel F037 08/12/2023 <0.10     Blueberry  F288 08/12/2023 <0.10     Brazil Nut  F018 08/12/2023 <0.10     Broccoli  F260 08/12/2023 <0.10     Buckwheat  F011 08/12/2023 <0.10     Cabbage F216 08/12/2023 <0.10     F31 Carrot 08/12/2023 <0.10     Cashew Nut  F202 08/12/2023 <0.10     F299 Sweet [place] 08/12/2023 <0.10     Chick Pea  F309 08/12/2023 <0.10     Chocolate Cacao, IgE F093 08/12/2023 <0.10     Cinnamon  F220  08/12/2023 <0.10     Clam  F207 08/12/2023 <0.10     Coconut 08/12/2023 <0.10     Codfish Allergen 08/12/2023 <0.10     Occupational Cotton Seed* 08/12/2023 <0.10     Crab  F023 08/12/2023 <0.10     [place]  F244 08/12/2023 <0.10     Dill  F277 08/12/2023 <0.10     F1 Eggwhite 08/12/2023 <0.10     Linda  F270 08/12/2023 <0.10     Grape Allergen 08/12/2023 <0.10     Halibut  F303 08/12/2023 <0.10     Hazelnut/Filbert  F017 08/12/2023 <0.10     Honeydew/Cantaloupe, IgE 08/12/2023 <0.10     Lettuce F215 08/12/2023 <0.10     Mooney Brean/White Zepeda, I* 08/12/2023 <0.10     F2 Milk 08/12/2023 <0.10     Onions  F048 08/12/2023 <0.10     Orange F033 08/12/2023 <0.10     Oregano  F283 08/12/2023 <0.10     F12 Pea 08/12/2023 <0.10     F013 Peanut 08/12/2023 <0.10     Pear  F094 08/12/2023 <0.10     Nenzel  F255 08/12/2023 <0.10     Pork  F026 08/12/2023 <0.10     Potato 08/12/2023 <0.10     Raspberry, IgE F343 08/12/2023 <0.10     Sesame Seed 08/12/2023 <0.10     Shrimp IgE 08/12/2023 <0.10     F14 Zepeda Soybean 08/12/2023 <0.10     Tea  F222 08/12/2023 <0.10     Tomato IgE 08/12/2023 <0.10     Trout  F204 08/12/2023 <0.10     Turkey F284 08/12/2023 <0.10     Hardin  F255 08/12/2023 <0.10     Watermelon 08/12/2023 <0.10     Immunocap Score 08/12/2023 See Note     Sodium 08/12/2023 138     Potassium 08/12/2023 4.3     Chloride 08/12/2023 102     Co2 08/12/2023 23     Anion Gap 08/12/2023 13.0     Glucose 08/12/2023 100 (H)     Bun 08/12/2023 10     Creatinine 08/12/2023 0.63     Calcium 08/12/2023 9.8     Correct Calcium 08/12/2023 9.4     AST(SGOT) 08/12/2023 18     ALT(SGPT) 08/12/2023 20     Alkaline Phosphatase 08/12/2023 71     Total Bilirubin 08/12/2023 0.4     Albumin 08/12/2023 4.5     Total Protein 08/12/2023 6.9     Globulin 08/12/2023 2.4     A-G Ratio 08/12/2023 1.9     Cholesterol,Tot 08/12/2023 178     Triglycerides 08/12/2023 110     HDL 08/12/2023 30 (A)     LDL 08/12/2023 126 (H)     Fasting Status 08/12/2023  Fasting     GFR (CKD-EPI) 08/12/2023 116    Hospital Outpatient Visit on 08/12/2023   Component Date Value    Prolactin 08/12/2023 26.30 (H)     TSH 08/12/2023 2.900    Admission on 06/09/2023, Discharged on 06/09/2023   Component Date Value    WBC 06/09/2023 9.4     RBC 06/09/2023 5.50 (H)     Hemoglobin 06/09/2023 15.5     Hematocrit 06/09/2023 46.3     MCV 06/09/2023 84.2     MCH 06/09/2023 28.2     MCHC 06/09/2023 33.5     RDW 06/09/2023 42.9     Platelet Count 06/09/2023 299     MPV 06/09/2023 9.8     Neutrophils-Polys 06/09/2023 69.00     Lymphocytes 06/09/2023 22.90     Monocytes 06/09/2023 5.70     Eosinophils 06/09/2023 1.70     Basophils 06/09/2023 0.40     Immature Granulocytes 06/09/2023 0.30     Nucleated RBC 06/09/2023 0.00     Neutrophils (Absolute) 06/09/2023 6.46     Lymphs (Absolute) 06/09/2023 2.15     Monos (Absolute) 06/09/2023 0.53     Eos (Absolute) 06/09/2023 0.16     Baso (Absolute) 06/09/2023 0.04     Immature Granulocytes (a* 06/09/2023 0.03     NRBC (Absolute) 06/09/2023 0.00     Sodium 06/09/2023 135     Potassium 06/09/2023 3.7     Chloride 06/09/2023 99     Co2 06/09/2023 20     Anion Gap 06/09/2023 16.0     Glucose 06/09/2023 92     Bun 06/09/2023 12     Creatinine 06/09/2023 0.68     Calcium 06/09/2023 9.4     AST(SGOT) 06/09/2023 16     ALT(SGPT) 06/09/2023 29     Alkaline Phosphatase 06/09/2023 74     Total Bilirubin 06/09/2023 0.4     Albumin 06/09/2023 4.5     Total Protein 06/09/2023 7.1     Globulin 06/09/2023 2.6     A-G Ratio 06/09/2023 1.7     Lipase 06/09/2023 21     Beta-Hcg Qualitative Ser* 06/09/2023 Negative     Correct Calcium 06/09/2023 9.0     GFR (CKD-EPI) 06/09/2023 114    Hospital Outpatient Visit on 05/04/2023   Component Date Value    Significant Indicator 05/04/2023 NEG     Source 05/04/2023 UR     Site 05/04/2023 URINE, CLEAN CATCH     Culture Result 05/04/2023 Usual urogenital cody 10-50,000 cfu/mL     Lipase 05/04/2023 23     Amylase 05/04/2023 34     TSH  05/04/2023 2.900     Sodium 05/04/2023 139     Potassium 05/04/2023 4.0     Chloride 05/04/2023 100     Co2 05/04/2023 22     Anion Gap 05/04/2023 17.0 (H)     Glucose 05/04/2023 76     Bun 05/04/2023 12     Creatinine 05/04/2023 0.62     Calcium 05/04/2023 9.4     AST(SGOT) 05/04/2023 20     ALT(SGPT) 05/04/2023 34     Alkaline Phosphatase 05/04/2023 76     Total Bilirubin 05/04/2023 0.4     Albumin 05/04/2023 4.7     Total Protein 05/04/2023 7.3     Globulin 05/04/2023 2.6     A-G Ratio 05/04/2023 1.8     25-Hydroxy   Vitamin D 25 05/04/2023 61     Glycohemoglobin 05/04/2023 5.3     Est Avg Glucose 05/04/2023 105     Cholesterol,Tot 05/04/2023 201 (H)     Triglycerides 05/04/2023 183 (H)     HDL 05/04/2023 31 (A)     LDL 05/04/2023 133 (H)     Fasting Status 05/04/2023 Fasting     Color 05/04/2023 Yellow     Character 05/04/2023 Clear     Specific Gravity 05/04/2023 1.005     Ph 05/04/2023 6.5     Glucose 05/04/2023 Negative     Ketones 05/04/2023 Negative     Protein 05/04/2023 Negative     Bilirubin 05/04/2023 Negative     Urobilinogen, Urine 05/04/2023 0.2     Nitrite 05/04/2023 Negative     Leukocyte Esterase 05/04/2023 Negative     Occult Blood 05/04/2023 Negative     Micro Urine Req 05/04/2023 see below     Correct Calcium 05/04/2023 8.8     GFR (CKD-EPI) 05/04/2023 117    ]

## 2024-01-04 DIAGNOSIS — E28.2 PCOS (POLYCYSTIC OVARIAN SYNDROME): ICD-10-CM

## 2024-01-04 NOTE — TELEPHONE ENCOUNTER
Received request via: Patient    Was the patient seen in the last year in this department? Yes    Does the patient have an active prescription (recently filled or refills available) for medication(s) requested? No    Does the patient have correction Plus and need 100 day supply (blood pressure, diabetes and cholesterol meds only)? Patient does not have SCP      Last Ov 11/6/23  Last Labs 11/8/23

## 2024-03-11 ENCOUNTER — PATIENT MESSAGE (OUTPATIENT)
Dept: MEDICAL GROUP | Facility: CLINIC | Age: 39
End: 2024-03-11
Payer: COMMERCIAL

## 2024-03-11 DIAGNOSIS — R79.89 ELEVATED PROLACTIN LEVEL: ICD-10-CM

## 2024-03-11 DIAGNOSIS — E78.49 OTHER HYPERLIPIDEMIA: ICD-10-CM

## 2024-03-19 DIAGNOSIS — E28.2 PCOS (POLYCYSTIC OVARIAN SYNDROME): ICD-10-CM

## 2024-03-19 DIAGNOSIS — L70.0 ACNE VULGARIS: ICD-10-CM

## 2024-03-19 DIAGNOSIS — S93.402A SPRAIN OF LEFT ANKLE, UNSPECIFIED LIGAMENT, INITIAL ENCOUNTER: ICD-10-CM

## 2024-03-20 RX ORDER — IBUPROFEN 800 MG/1
800 TABLET ORAL EVERY 8 HOURS PRN
Qty: 40 TABLET | Refills: 0 | Status: SHIPPED | OUTPATIENT
Start: 2024-03-20

## 2024-03-20 RX ORDER — TETRACYCLINE HYDROCHLORIDE 250 MG/1
250 CAPSULE ORAL 2 TIMES DAILY
Qty: 180 CAPSULE | Refills: 0 | Status: SHIPPED | OUTPATIENT
Start: 2024-03-20

## 2024-03-20 NOTE — TELEPHONE ENCOUNTER
Requested Prescriptions     Pending Prescriptions Disp Refills    metformin (GLUCOPHAGE) 1000 MG tablet [Pharmacy Med Name: metFORMIN HCl 1000 MG Oral Tablet] 180 Tablet 0     Sig: TAKE 1 TABLET BY MOUTH TWICE DAILY WITH MEALS    tetracycline (SUMYCIN) 250 MG Cap [Pharmacy Med Name: Tetracycline HCl 250 MG Oral Capsule] 180 Capsule 0     Sig: Take 1 capsule by mouth twice daily    ibuprofen (MOTRIN) 800 MG Tab [Pharmacy Med Name: Ibuprofen 800 MG Oral Tablet] 40 Tablet 0     Sig: TAKE 1 TABLET BY MOUTH EVERY 8 HOURS AS NEEDED FOR MODERATE PAIN      Last office visit: 11/6/23  Last lab: 11/8/23

## 2024-04-01 ENCOUNTER — APPOINTMENT (OUTPATIENT)
Dept: LAB | Facility: MEDICAL CENTER | Age: 39
End: 2024-04-01
Payer: COMMERCIAL

## 2024-04-01 DIAGNOSIS — E78.49 OTHER HYPERLIPIDEMIA: ICD-10-CM

## 2024-04-01 DIAGNOSIS — R79.89 ELEVATED PROLACTIN LEVEL: ICD-10-CM

## 2024-04-01 LAB
ALBUMIN SERPL BCP-MCNC: 4.4 G/DL (ref 3.2–4.9)
ALBUMIN/GLOB SERPL: 1.7 G/DL
ALP SERPL-CCNC: 72 U/L (ref 30–99)
ALT SERPL-CCNC: 28 U/L (ref 2–50)
ANION GAP SERPL CALC-SCNC: 15 MMOL/L (ref 7–16)
AST SERPL-CCNC: 21 U/L (ref 12–45)
BILIRUB SERPL-MCNC: 0.4 MG/DL (ref 0.1–1.5)
BUN SERPL-MCNC: 16 MG/DL (ref 8–22)
CALCIUM ALBUM COR SERPL-MCNC: 8.9 MG/DL (ref 8.5–10.5)
CALCIUM SERPL-MCNC: 9.2 MG/DL (ref 8.5–10.5)
CHLORIDE SERPL-SCNC: 101 MMOL/L (ref 96–112)
CHOLEST SERPL-MCNC: 199 MG/DL (ref 100–199)
CO2 SERPL-SCNC: 22 MMOL/L (ref 20–33)
CREAT SERPL-MCNC: 0.55 MG/DL (ref 0.5–1.4)
FASTING STATUS PATIENT QL REPORTED: NORMAL
GFR SERPLBLD CREATININE-BSD FMLA CKD-EPI: 120 ML/MIN/1.73 M 2
GLOBULIN SER CALC-MCNC: 2.6 G/DL (ref 1.9–3.5)
GLUCOSE SERPL-MCNC: 74 MG/DL (ref 65–99)
HDLC SERPL-MCNC: 32 MG/DL
LDLC SERPL CALC-MCNC: 141 MG/DL
POTASSIUM SERPL-SCNC: 3.6 MMOL/L (ref 3.6–5.5)
PROLACTIN SERPL-MCNC: 11.8 NG/ML (ref 2.8–26)
PROT SERPL-MCNC: 7 G/DL (ref 6–8.2)
SODIUM SERPL-SCNC: 138 MMOL/L (ref 135–145)
TRIGL SERPL-MCNC: 131 MG/DL (ref 0–149)

## 2024-04-01 PROCEDURE — 84146 ASSAY OF PROLACTIN: CPT

## 2024-04-01 PROCEDURE — 80061 LIPID PANEL: CPT

## 2024-04-01 PROCEDURE — 80053 COMPREHEN METABOLIC PANEL: CPT

## 2024-04-01 PROCEDURE — 36415 COLL VENOUS BLD VENIPUNCTURE: CPT

## 2024-04-16 ENCOUNTER — APPOINTMENT (OUTPATIENT)
Dept: MEDICAL GROUP | Facility: CLINIC | Age: 39
End: 2024-04-16
Payer: COMMERCIAL

## 2024-04-30 ENCOUNTER — APPOINTMENT (OUTPATIENT)
Dept: MEDICAL GROUP | Facility: CLINIC | Age: 39
End: 2024-04-30
Payer: COMMERCIAL

## 2024-04-30 VITALS — BODY MASS INDEX: 36.8 KG/M2 | HEIGHT: 62 IN | WEIGHT: 199.96 LBS

## 2024-04-30 ASSESSMENT — FIBROSIS 4 INDEX: FIB4 SCORE: 0.5

## 2024-06-09 DIAGNOSIS — L70.0 ACNE VULGARIS: ICD-10-CM

## 2024-06-10 RX ORDER — TETRACYCLINE HYDROCHLORIDE 250 MG/1
250 CAPSULE ORAL 2 TIMES DAILY
Qty: 180 CAPSULE | Refills: 0 | Status: SHIPPED | OUTPATIENT
Start: 2024-06-10

## 2024-06-10 NOTE — TELEPHONE ENCOUNTER
Received request via: Patient    Was the patient seen in the last year in this department? Yes    Does the patient have an active prescription (recently filled or refills available) for medication(s) requested?  Yes    Pharmacy Name: [facility] in [place]    Does the patient have alf Plus and need 100 day supply (blood pressure, diabetes and cholesterol meds only)? Patient does not have SCP

## 2024-06-18 ENCOUNTER — OFFICE VISIT (OUTPATIENT)
Dept: MEDICAL GROUP | Facility: CLINIC | Age: 39
End: 2024-06-18
Payer: COMMERCIAL

## 2024-06-18 VITALS
OXYGEN SATURATION: 96 % | WEIGHT: 223.99 LBS | RESPIRATION RATE: 18 BRPM | HEIGHT: 62 IN | BODY MASS INDEX: 41.22 KG/M2 | SYSTOLIC BLOOD PRESSURE: 136 MMHG | HEART RATE: 90 BPM | DIASTOLIC BLOOD PRESSURE: 86 MMHG | TEMPERATURE: 97.8 F

## 2024-06-18 DIAGNOSIS — E78.00 ELEVATED LDL CHOLESTEROL LEVEL: ICD-10-CM

## 2024-06-18 DIAGNOSIS — E66.01 MORBID OBESITY (HCC): ICD-10-CM

## 2024-06-18 DIAGNOSIS — B35.3 TINEA PEDIS OF BOTH FEET: ICD-10-CM

## 2024-06-18 DIAGNOSIS — E78.49 OTHER HYPERLIPIDEMIA: ICD-10-CM

## 2024-06-18 DIAGNOSIS — R79.89 ELEVATED PROLACTIN LEVEL: ICD-10-CM

## 2024-06-18 DIAGNOSIS — R29.898 WEAKNESS OF RIGHT LEG: ICD-10-CM

## 2024-06-18 DIAGNOSIS — G60.0 CHARCOT-MARIE-TOOTH DISEASE OF NEURONAL TYPE: ICD-10-CM

## 2024-06-18 DIAGNOSIS — N64.52 BREAST DISCHARGE: ICD-10-CM

## 2024-06-18 DIAGNOSIS — Z23 NEED FOR VACCINATION: ICD-10-CM

## 2024-06-18 DIAGNOSIS — E55.9 VITAMIN D DEFICIENCY: ICD-10-CM

## 2024-06-18 PROCEDURE — 90471 IMMUNIZATION ADMIN: CPT | Performed by: PHYSICIAN ASSISTANT

## 2024-06-18 PROCEDURE — 90746 HEPB VACCINE 3 DOSE ADULT IM: CPT | Performed by: PHYSICIAN ASSISTANT

## 2024-06-18 PROCEDURE — 3079F DIAST BP 80-89 MM HG: CPT | Performed by: PHYSICIAN ASSISTANT

## 2024-06-18 PROCEDURE — 99214 OFFICE O/P EST MOD 30 MIN: CPT | Mod: 25 | Performed by: PHYSICIAN ASSISTANT

## 2024-06-18 PROCEDURE — 3075F SYST BP GE 130 - 139MM HG: CPT | Performed by: PHYSICIAN ASSISTANT

## 2024-06-18 RX ORDER — PANTOPRAZOLE SODIUM 40 MG/1
40 TABLET, DELAYED RELEASE ORAL DAILY
COMMUNITY
Start: 2024-04-05

## 2024-06-18 RX ORDER — LURASIDONE HYDROCHLORIDE 20 MG/1
40 TABLET, FILM COATED ORAL
COMMUNITY
Start: 2024-04-05

## 2024-06-18 RX ORDER — HYDROCORTISONE 2.5% / KETOCONAZOLE 2% 2.5; 2 G/100G; G/100G
CREAM TOPICAL
Qty: 60 G | Refills: 1 | Status: SHIPPED | OUTPATIENT
Start: 2024-06-18

## 2024-06-18 ASSESSMENT — FIBROSIS 4 INDEX: FIB4 SCORE: 0.5

## 2024-06-18 NOTE — PROGRESS NOTES
cc:  hyperlipidemia    Subjective:     Jennifer Gee is a 38 y.o. female presenting for hyperlipidemia        History of Present Illness  The patient is a 38-year-old female here to follow up with labs. She does have hyperlipidemia, vitamin D deficiency, morbid obesity, and a history of an elevated prolactin level. She also has Charcot-Michelle-Tooth disease.    The patient has scheduled an appointment for pain management on the upcoming Monday. Her partner assists her with tasks. She experiences foot drop, which occasionally prevents her from moving. Her gait has altered. It has been 5 years since her last foot x-ray. Despite undergoing neuro technology testing, her condition has not improved and is progressing.    The patient requires her third hepatitis B vaccine. She has a history of chickenpox.    The patient is seeking Mounjaro injections for weight loss. She has been managing PCOS with metformin for several years.    The patient continues to experience yellow discharge from her nipples. Her gynocologist, Dr. 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 The discharge is darker and occasionally thick. She suspects a tumor in her pituitary gland due to her lactation. She has not undergone an MRI due to her improved prolactin levels. She has a history of brain cysts. Dr. Johnston recommended medication to dissolve the tumors in her breast. She is scheduled for a repeat mammogram in 08/2024.    The patient continues to experience hip pain. An x-ray of her hip revealed no abnormalities. She experiences difficulty walking due to leg weakness. Her knees and hips are also normal. She suspects her hip pain is related to her Charcot-Michelle-Tooth disease. She is considering an epidural injection. She experiences severe pain on the right side of her body. She is claustrophobic and requires sedation. Her neurologist is Dr. Mishra.    The patient's toes exhibit cracking and itching. She does  not have athlete's foot. She initially thought it was a callus, but she managed to crack down. [place]. She purchased a filter shower head for relief. Her girlfriend, who lives with her, does not have Charcot-Michelle-Tooth disease.    Supplemental Information  She has lipomas on her legs. She has frequent urination. Dr. Huerta told her not to urinate more than 10 times a day in 24 hours and she needs to work on her Kegel exercises. She has weakness in her right leg. Her right side of her body is having extreme amounts of lag. She loses her balance more. She is on vitamin D.   Her mother had cancer.       Review of systems:  See above.   Denies any symptoms unless previously indicated.        Current Outpatient Medications:     lurasidone (LATUDA) 20 MG Tab, Take 40 mg by mouth with dinner., Disp: , Rfl:     pantoprazole (PROTONIX) 40 MG Tablet Delayed Response, Take 40 mg by mouth every day., Disp: , Rfl:     Ketoconazole-Hydrocortisone 2-2.5 % Cream, Apply a small amount to the affected area twice a day no more than 2 weeks., Disp: 60 g, Rfl: 1    tetracycline (SUMYCIN) 250 MG Cap, Take 1 tablet by mouth twice daily, Disp: 180 Capsule, Rfl: 0    metformin (GLUCOPHAGE) 1000 MG tablet, TAKE 1 TABLET BY MOUTH TWICE DAILY WITH MEALS, Disp: 180 Tablet, Rfl: 0    ibuprofen (MOTRIN) 800 MG Tab, TAKE 1 TABLET BY MOUTH EVERY 8 HOURS AS NEEDED FOR MODERATE PAIN, Disp: 40 Tablet, Rfl: 0    omeprazole (PRILOSEC) 20 MG delayed-release capsule, Take 1 Capsule by mouth every day., Disp: 90 Capsule, Rfl: 2    traZODone (DESYREL) 100 MG Tab, TAKE 1 TABLET BY MOUTH AT BEDTIME, Disp: 90 Tablet, Rfl: 0    vitamin D2, Ergocalciferol, (DRISDOL) 1.25 MG (51608 UT) Cap capsule, TAKE ONE CAPSULE BY MOUTH ONCE WEEKLY, Disp: 12 Capsule, Rfl: 2    cyclobenzaprine (FLEXERIL) 10 mg Tab, Take 1 Tablet by mouth 3 times a day as needed for Moderate Pain., Disp: 90 Tablet, Rfl: 1    albuterol 108 (90 Base)  "MCG/ACT Aero Soln inhalation aerosol, Inhale 2 Puffs every 6 hours as needed for Shortness of Breath., Disp: 18 g, Rfl: 4    Clonazepam 1 MG TABLET DISPERSIBLE, , Disp: , Rfl:     loratadine (CLARITIN) 10 MG Tab, Take 1 Tablet by mouth every day., Disp: 30 Tablet, Rfl: 3    NON SPECIFIED, Massage therapy for neuropathy., Disp: 1 Each, Rfl: 12    traMADol (ULTRAM) 50 MG Tab, TAKE 1 TABLET BY MOUTH EVERY 4 HOURS AS NEEDED FOR PAIN FOR 7 DAYS (Patient not taking: Reported on 9/5/2023), Disp: , Rfl:     oxyCODONE-acetaminophen (PERCOCET) 5-325 MG Tab, TAKE 1 TABLET BY MOUTH EVERY 4 HOURS AS NEEDED FOR MODERATE PAIN FOR 5 DAYS (Patient not taking: Reported on 9/5/2023), Disp: , Rfl:     lamotrigine (LAMICTAL) 150 MG tablet, Take 1 Tablet by mouth every day. (Patient not taking: Reported on 4/30/2024), Disp: 90 Tablet, Rfl: 0    fluticasone (FLONASE) 50 MCG/ACT nasal spray, Administer 2 Sprays into affected nostril(S) every day. (Patient not taking: Reported on 11/6/2023), Disp: 16 g, Rfl: 5    Allergies, past medical history, past surgical history, family history, social history reviewed and updated    Objective:     Vitals: /86 (BP Location: Left arm, Patient Position: Sitting, BP Cuff Size: Large adult)   Pulse 90   Temp 36.6 °C (97.8 °F) (Temporal)   Resp 18   Ht 1.575 m (5' 2\")   Wt 102 kg (223 lb 15.8 oz)   SpO2 96%   BMI 40.97 kg/m²   General: Alert, pleasant, NAD  EYES:   PERRL, EOMI, no icterus or pallor.  Conjunctivae and lids normal.   HENT:  Normocephalic.  External ears normal.  Neck supple.     Respiratory: Normal respiratory effort.  .  Abdomen: obese  Skin: Warm, dry, no rashes.  Musculoskeletal: Gait is normal.  Moves all extremities well.    Extremities: cracking at base of toes.  Club feet..   Neurological: No tremors, sensation grossly intact, CN2-12 intact.  Psych:  Affect/mood is normal, judgement is good, memory is intact, grooming is appropriate.      Results  Laboratory " Studies  Glucose levels are good. Kidney and liver function are good. Prolactin levels look good and have actually improved. LDL cholesterol is higher than it has been, but triglycerides were better.      Latest Reference Range & Units 04/01/24 10:38   Sodium 135 - 145 mmol/L 138   Potassium 3.6 - 5.5 mmol/L 3.6   Chloride 96 - 112 mmol/L 101   Co2 20 - 33 mmol/L 22   Anion Gap 7.0 - 16.0  15.0   Glucose 65 - 99 mg/dL 74   Bun 8 - 22 mg/dL 16   Creatinine 0.50 - 1.40 mg/dL 0.55   GFR (CKD-EPI) >60 mL/min/1.73 m 2 120   Calcium 8.5 - 10.5 mg/dL 9.2   Correct Calcium 8.5 - 10.5 mg/dL 8.9   AST(SGOT) 12 - 45 U/L 21   ALT(SGPT) 2 - 50 U/L 28   Alkaline Phosphatase 30 - 99 U/L 72   Total Bilirubin 0.1 - 1.5 mg/dL 0.4   Albumin 3.2 - 4.9 g/dL 4.4   Total Protein 6.0 - 8.2 g/dL 7.0   Globulin 1.9 - 3.5 g/dL 2.6   A-G Ratio g/dL 1.7   Fasting Status  Fasting   Cholesterol,Tot 100 - 199 mg/dL 199   Triglycerides 0 - 149 mg/dL 131   HDL >=40 mg/dL 32 !   LDL <100 mg/dL 141 (H)   Prolactin 2.80 - 26.00 ng/mL 11.80   !: Data is abnormal  (H): Data is abnormally high    Assessment/Plan:     Jennifer was seen today for follow-up.    Diagnoses and all orders for this visit:    Elevated LDL cholesterol level  Other hyperlipidemia  -     Lipid Profile; Future  -     Comp Metabolic Panel; Future    Vitamin D deficiency  Morbid obesity (HCC)    Breast discharge  -     Referral to Gynecology  -     MR-BRAIN PITUITARY W/WO; Future  Elevated prolactin level  -     Referral to Gynecology  -     MR-BRAIN PITUITARY W/WO; Future    Charcot-Michelle-Tooth disease of neuronal type  -     MR-LUMBAR SPINE-W/O; Future  -     MR-HIP-W/O; Future  Weakness of right leg    Tinea pedis of both feet  -     Ketoconazole-Hydrocortisone 2-2.5 % Cream; Apply a small amount to the affected area twice a day no more than 2 weeks.    Need for vaccination  -     Hepatitis B Vaccine Adult 20+        Assessment & Plan  1. Elevated LDL/hyperlipidemia.  A repeat set of  labs will be conducted in 3 months, given the patient's LDL level has increased to 141.    2. Vitamin D deficiency.  The patient is advised to persist with the vitamin D supplement.    3. Morbid obesity.  The use of Wegovy for treatment was discussed, but currently, insurance does not cover it for weight loss. Continued monitoring of the patient's condition will be conducted.    4. Elevated prolactin level/breast discharge.  An MRI of the pituitary gland will be obtained. The patient's prolactin level is currently normal. A referral to a new gynecologist will be made.    5. Charcot-Michelle-Tooth/weakness, right leg.  An MRI of the right hip and lumbar spine will be obtained, especially as the patient is experiencing weakness.    6. Tinea pedis of both feet.  The patient will commence treatment with ketoconazole and hydrocortisone cream to alleviate symptoms.    7. Need for vaccine.  A third hepatitis B vaccine was administered today.    No follow-ups on file.    Please note that this dictation was created using voice recognition software. I have made every reasonable attempt to correct obvious errors, but expect that there are errors of grammar and possible content that I did not discover before finalizing note.

## 2024-06-24 ENCOUNTER — PATIENT MESSAGE (OUTPATIENT)
Dept: MEDICAL GROUP | Facility: CLINIC | Age: 39
End: 2024-06-24

## 2024-06-24 ENCOUNTER — OFFICE VISIT (OUTPATIENT)
Dept: PHYSICAL MEDICINE AND REHAB | Facility: MEDICAL CENTER | Age: 39
End: 2024-06-24
Payer: COMMERCIAL

## 2024-06-24 VITALS
WEIGHT: 225.2 LBS | OXYGEN SATURATION: 96 % | BODY MASS INDEX: 41.44 KG/M2 | HEART RATE: 91 BPM | SYSTOLIC BLOOD PRESSURE: 132 MMHG | TEMPERATURE: 97.8 F | DIASTOLIC BLOOD PRESSURE: 72 MMHG | HEIGHT: 62 IN

## 2024-06-24 DIAGNOSIS — F12.90 MARIJUANA USE: ICD-10-CM

## 2024-06-24 DIAGNOSIS — R26.9 ABNORMAL GAIT: ICD-10-CM

## 2024-06-24 DIAGNOSIS — G60.0 CHARCOT-MARIE-TOOTH DISEASE OF NEURONAL TYPE: ICD-10-CM

## 2024-06-24 DIAGNOSIS — R20.8 DECREASED SENSATION OF LOWER EXTREMITY: ICD-10-CM

## 2024-06-24 DIAGNOSIS — G89.29 CHRONIC BILATERAL LOW BACK PAIN WITH RIGHT-SIDED SCIATICA: ICD-10-CM

## 2024-06-24 DIAGNOSIS — Z71.82 EXERCISE COUNSELING: ICD-10-CM

## 2024-06-24 DIAGNOSIS — M25.551 CHRONIC RIGHT HIP PAIN: ICD-10-CM

## 2024-06-24 DIAGNOSIS — M54.2 NECK PAIN: ICD-10-CM

## 2024-06-24 DIAGNOSIS — B35.3 TINEA PEDIS OF BOTH FEET: ICD-10-CM

## 2024-06-24 DIAGNOSIS — E55.9 VITAMIN D DEFICIENCY: ICD-10-CM

## 2024-06-24 DIAGNOSIS — M54.41 CHRONIC BILATERAL LOW BACK PAIN WITH RIGHT-SIDED SCIATICA: ICD-10-CM

## 2024-06-24 DIAGNOSIS — M54.6 DORSALGIA OF THORACIC REGION: ICD-10-CM

## 2024-06-24 DIAGNOSIS — G89.29 CHRONIC RIGHT HIP PAIN: ICD-10-CM

## 2024-06-24 DIAGNOSIS — Z13.31 POSITIVE DEPRESSION SCREENING: ICD-10-CM

## 2024-06-24 PROCEDURE — 3078F DIAST BP <80 MM HG: CPT | Performed by: GENERAL PRACTICE

## 2024-06-24 PROCEDURE — 3075F SYST BP GE 130 - 139MM HG: CPT | Performed by: GENERAL PRACTICE

## 2024-06-24 PROCEDURE — 99204 OFFICE O/P NEW MOD 45 MIN: CPT | Performed by: GENERAL PRACTICE

## 2024-06-24 PROCEDURE — 1125F AMNT PAIN NOTED PAIN PRSNT: CPT | Performed by: GENERAL PRACTICE

## 2024-06-24 RX ORDER — KETOCONAZOLE 20 MG/G
CREAM TOPICAL
Qty: 45 G | Refills: 1 | Status: SHIPPED | OUTPATIENT
Start: 2024-06-24

## 2024-06-24 ASSESSMENT — FIBROSIS 4 INDEX: FIB4 SCORE: 0.5

## 2024-06-24 ASSESSMENT — PATIENT HEALTH QUESTIONNAIRE - PHQ9
5. POOR APPETITE OR OVEREATING: 3 - NEARLY EVERY DAY
SUM OF ALL RESPONSES TO PHQ QUESTIONS 1-9: 21
CLINICAL INTERPRETATION OF PHQ2 SCORE: 5

## 2024-06-24 ASSESSMENT — PAIN SCALES - GENERAL: PAINLEVEL: 9=SEVERE PAIN

## 2024-06-24 NOTE — Clinical Note
Ms Romelia Gee was evaluated today for her pain.  Due to her multiple medications and significant drug interactions, I did not prescribe anything and would suggest that the consideration of an SNRI or TCA as option for her pain regimen.  I did place a physical therapy referral and ordered additional imaging to assist with her evaluation.   Thank you for the referral.  Please contact if further questions.     Best Regards,   Chente Jaramlilo, DO   Physical Medicine and Rehabilitation

## 2024-06-24 NOTE — PROGRESS NOTES
"Physiatry (Physical Medicine and  Rehabilitation)       Patient Name: Jennifer Gee   Patient : 1985  PCP: Romelia Estrada P.A.-C.  MRN: [id number]     Date of service: See epic    Referring provider: Romelia Estrada P.A.-C.    CHIEF COMPLAINT  Chief Complaint   Patient presents with    Establish Care     Chronic R-hip pain         Jennifer Gee is a RIGHT hand dominant 38 y.o. female  who presents today with Diagnoses of Chronic bilateral low back pain with right-sided sciatica, Decreased sensation of lower extremity, Dorsalgia of thoracic region, Chronic right hip pain, Body mass index 40.0-44.9, adult (HCC), Vitamin D deficiency, Positive depression screening, Charcot-Michelle-Tooth disease of neuronal type, Neck pain, Exercise counseling, Marijuana use, and Abnormal gait were pertinent to this visit.      Medical records review:  I reviewed the note from the referring provider Romelia Estrada P.A.-C. including the note dated 6/10/24.    Prior Procedures:   Patient has not attempted prior injections.   Patient has not had prior surgery.     HPI:    2024 chronic right hip pain with history of CMT. MRI to be completed 2024.   Pain right now is 10/10 on the numeric pain scale. Her pain at its best-worse level during the course of the day is typically 10/10. Pain worst at the end of the day.  Pain worsens with nothing and improves with nothing. Her pain significantly interferes with ADLs.   No recent injury, falls, or trauma. Difficulty walking.    The patient has completed a provider driven physical therapy program for this problem prior to covid in .    Patient has tried the following medications with varied success (current meds in bold):   flexeril, ibuprofen, vicodin, percocet, tramadol in the past.  Reported access to medications \"off the street\".  Prior use of gabapentin in .     Therapeutic modalities and interventional therapies to date include:  -Prior prednisone: yes. Not " reported effective for pain control   -Home exercises:?yes    -Heat and ice:?yes   -Topicals:?yes, ineffective  -TENS unit:?yes, ineffective   -Chiropractic manipulation:?yes, as a child   -Acupuncture:?no  -Massage:?yes, helpful    ROS:   Fever, Chills, Sweats: Denies  Involuntary Weight Loss: Denies  Stress incontinence  See HPI.   All other systems reviewed and negative.     OCCUPATIONAL history:  and [employer] member  HOBBIES/ACTIVITIES: camping    GOALS OF TREATMENT: Symptom/Pain relief. improve function.      Psychological testing for pain as depression and pain commonly coexist and need to both be treated.     Opioid Risk Score: 7      Interpretation of Opioid Risk Score   Score 0-3 = Low risk of abuse. Do UDS at least once per year.  Score 4-7 = Moderate risk of abuse. Do UDS 1-4 times per year.  Score 8+ = High risk of abuse. Refer to specialist.    PHQ      10/12/2017     8:00 AM 1/22/2018     4:20 PM 6/24/2024     8:20 AM   Depression Screen (PHQ-2/PHQ-9)   PHQ-2 Total Score 0 0 5   PHQ-9 Total Score   21       Interpretation of PHQ-9 Total Score   Score Severity   1-4 No Depression   5-9 Mild Depression   10-14 Moderate Depression   15-19 Moderately Severe Depression   20-27 Severe Depression      PMHx:   Past Medical History:   Diagnosis Date    Bladder infection 2012    Borderline personality disorder (HCC)     cutter    Chickenpox     COVID-19     COVID-19 9/21/2021    Depression     Gastritis     History of dental surgery     Influenza     Obesity     Pneumonia 2007    Restless leg syndrome        PSHx:   Past Surgical History:   Procedure Laterality Date    TONSILLECTOMY Bilateral 08/05/2019    CHOLECYSTECTOMY      UVULOPHARYNGOPALATOPLASTY         Family history   Family History   Problem Relation Age of Onset    Hyperlipidemia Mother     Hypertension Mother     Alcohol/Drug Father     Heart Failure Father     Heart Disease Maternal Grandfather     Cancer Maternal Grandfather         Colon     Stroke Neg Hx     Diabetes Neg Hx        Medications: reviewed on epic.   Outpatient Medications Marked as Taking for the 6/24/24 encounter (Office Visit) with Chente Jaramillo D.O.   Medication Sig Dispense Refill    lurasidone (LATUDA) 20 MG Tab Take 40 mg by mouth with dinner.      pantoprazole (PROTONIX) 40 MG Tablet Delayed Response Take 40 mg by mouth every day.      Ketoconazole-Hydrocortisone 2-2.5 % Cream Apply a small amount to the affected area twice a day no more than 2 weeks. 60 g 1    tetracycline (SUMYCIN) 250 MG Cap Take 1 tablet by mouth twice daily 180 Capsule 0    metformin (GLUCOPHAGE) 1000 MG tablet TAKE 1 TABLET BY MOUTH TWICE DAILY WITH MEALS 180 Tablet 0    ibuprofen (MOTRIN) 800 MG Tab TAKE 1 TABLET BY MOUTH EVERY 8 HOURS AS NEEDED FOR MODERATE PAIN 40 Tablet 0    omeprazole (PRILOSEC) 20 MG delayed-release capsule Take 1 Capsule by mouth every day. 90 Capsule 2    traZODone (DESYREL) 100 MG Tab TAKE 1 TABLET BY MOUTH AT BEDTIME 90 Tablet 0    vitamin D2, Ergocalciferol, (DRISDOL) 1.25 MG (95265 UT) Cap capsule TAKE ONE CAPSULE BY MOUTH ONCE WEEKLY 12 Capsule 2    cyclobenzaprine (FLEXERIL) 10 mg Tab Take 1 Tablet by mouth 3 times a day as needed for Moderate Pain. 90 Tablet 1    albuterol 108 (90 Base) MCG/ACT Aero Soln inhalation aerosol Inhale 2 Puffs every 6 hours as needed for Shortness of Breath. 18 g 4    Clonazepam 1 MG TABLET DISPERSIBLE       loratadine (CLARITIN) 10 MG Tab Take 1 Tablet by mouth every day. 30 Tablet 3    NON SPECIFIED Massage therapy for neuropathy. 1 Each 12        Allergies:   No Known Allergies    Social Hx:   Social History     Socioeconomic History    Marital status: Single     Spouse name: Not on file    Number of children: Not on file    Years of education: Not on file    Highest education level: Not on file   Occupational History    Not on file   Tobacco Use    Smoking status: Never    Smokeless tobacco: Never   Vaping Use    Vaping status: Some Days     "Substances: THC, CBD   Substance and Sexual Activity    Alcohol use: No    Drug use: Yes     Types: Inhaled, Marijuana     Comment: medical card, 3 puffs/day    Sexual activity: Yes     Partners: Female, Male   Other Topics Concern    Not on file   Social History Narrative    Not on file     Social Determinants of Health     Financial Resource Strain: Not on file   Food Insecurity: Not on file   Transportation Needs: Not on file   Physical Activity: Not on file   Stress: Not on file   Social Connections: Not on file   Intimate Partner Violence: Not on file   Housing Stability: Not on file         EXAMINATION   Vitals: /72 (BP Location: Right arm, Patient Position: Sitting, BP Cuff Size: Large adult)   Pulse 91   Temp 36.6 °C (97.8 °F) (Temporal)   Ht 1.575 m (5' 2\")   Wt 102 kg (225 lb 3.2 oz)   SpO2 96%   Physical Exam:     Body Habitus: Body mass index is 41.19 kg/m².  Appearance: Well-groomed, well-nourished, not disheveled  Eyes: No scleral icterus to suggest severe liver disease, no proptosis to suggest severe hyperthyroid  ENT -no obvious auditory deficits, no external lesions, moist mucus membranes   Skin -no rashes or lesions noted. No appreciable skin breakdown on exposed skin areas.    Respiratory-  breathing comfortably on room air, no audible wheezing, full sentences  Cardiovascular- No lower extremity edema noted.   Psychiatric- alert and oriented, calm, comfortable, cooperative     Musculoskeletal and Neuro:  Gait and station - normal gait with reciprocal pattern,  no presence/use of ambulatory device, no arm assistance with sit-to-stand, nonantalgic. no loss of balance during exam.  No change in patient's demeanor with exam.    Grossly normal cranial nerve exam  Coordination grossly intact  Single leg balance / Trendelenburg:  severely limited in balance and control positive bilaterally       Cervical spine   Inspection: No deformities of the skin over the cervical spine. No rashes or " lesions.  full   active range of motion in all directions, with  pain    Spurling’s sign: equivocal pain to axial pain bilaterally  No signs of muscular atrophy in bilateral upper extremities   No tenderness to palpation of the cervical spine  No tenderness to palpation on the BILATERAL side in the cervical facets.   tenderness of paraspinal muscles  Key points for the international standards for neurological classification of spinal cord injury (ISNCSCI) to light touch.   Dermatome R L   C4 2 2   C5 2 2   C6 2 2   C7 2 2   C8 2 2   T1 2 2   T2 2 2     Nerve: neurovascularly intact radial, median, ulnar, and AIN     Motor Exam Upper Extremities   ? Myotome R L   Shoulder flexion C5 5 5   Elbow flexion C5 5 5   Wrist extension C6 5 5   Elbow extension C7 5 5   Finger flexion C8 5 5   Finger abduction T1 5 5     Reflexes  ?  R L   Biceps  2+ 2+   Brachioradialis  2+ 2+   Tatum’s sign negative bilaterally       Thoracic/Lumbar Spine/Sacral Spine/Hips   Inspection: No evidence of atrophy in bilateral lower extremities throughout     ROM: full  active range of motion with flexion, lateral flexion, and rotation bilaterally.   There is full  active range of motion with lumbar extension with pain.    There is no pain with facet loading maneuver (extension rotation) with axial low back pain on the BILATERAL side(s)    Palpation:   tenderness to palpation in midline at T1-T12 levels. No tenderness to palpation in the left and right of the midline T1-L5, POSITIVE for tenderness to palpation to the para-midline region in the lower lumbar levels.  palpation over SI joint: negative bilaterally  palpation in hip or over the gluteus medius tendon insertion: positive right, negative left     Lumbar spine Special tests  Neuro tension  Straight leg test} negative bilaterally     HIP  FAIR test negative bilaterally   Range of motion in the RIGHT hip is full  in flexion, extension, abduction, internal rotation, external  "rotation.  Range of motion in the LEFT hip is full  in flexion, extension, abduction, internal rotation, external rotation.  Naveed positive bilaterally  Hamstring tightness with forward flexion  Finger-to-Floor Distance in maximal forward flexion >8inches    SI joint tests  Observation patient sits on one buttocks: Negative  SI joint compression positive bilaterally    SI joint distraction negative bilaterally  Thigh thrust test negative bilaterally   NAVEED test positive bilaterally    Key points for the international standards for neurological classification of spinal cord injury (ISNCSCI) to light touch.   Dermatome R L   L2 1 2   L3 1 2   L4 1 1   L5 1 1   S1 1 1     Motor Exam Lower Extremities  ? Myotome R L   Hip flexion L2 5 4+   Knee extension L3 5 5   Ankle dorsiflexion L4 5 5   Toe extension L5 5 5   Ankle plantarflexion S1 5 5   Hip Abduction  4+ 4+   Hip Adduction  4+ 4+     Reflexes  Clonus of the ankle negative bilaterally   ? R L   Patella 2+ 2+   Achilles  2+ 2+     MEDICAL DECISION MAKING    Medical records review: see under HPI section.     DATA    Labs:   Lab Results   Component Value Date/Time    SODIUM 138 04/01/2024 10:38 AM    POTASSIUM 3.6 04/01/2024 10:38 AM    CHLORIDE 101 04/01/2024 10:38 AM    CO2 22 04/01/2024 10:38 AM    ANION 15.0 04/01/2024 10:38 AM    GLUCOSE 74 04/01/2024 10:38 AM    BUN 16 04/01/2024 10:38 AM    CREATININE 0.55 04/01/2024 10:38 AM    CALCIUM 9.2 04/01/2024 10:38 AM    ASTSGOT 21 04/01/2024 10:38 AM    ALTSGPT 28 04/01/2024 10:38 AM    TBILIRUBIN 0.4 04/01/2024 10:38 AM    ALBUMIN 4.4 04/01/2024 10:38 AM    ALBUMIN 4.35 07/10/2018 05:47 PM    TOTPROTEIN 7.0 04/01/2024 10:38 AM    TOTPROTEIN 7.30 07/10/2018 05:47 PM    GLOBULIN 2.6 04/01/2024 10:38 AM    AGRATIO 1.7 04/01/2024 10:38 AM   ]    No results found for: \"PROTHROMBTM\", \"INR\"     Lab Results   Component Value Date/Time    WBC 9.4 06/09/2023 11:57 AM    RBC 5.50 (H) 06/09/2023 11:57 AM    HEMOGLOBIN 15.5 " 06/09/2023 11:57 AM    HEMATOCRIT 46.3 06/09/2023 11:57 AM    MCV 84.2 06/09/2023 11:57 AM    MCH 28.2 06/09/2023 11:57 AM    MCHC 33.5 06/09/2023 11:57 AM    MPV 9.8 06/09/2023 11:57 AM    NEUTSPOLYS 69.00 06/09/2023 11:57 AM    LYMPHOCYTES 22.90 06/09/2023 11:57 AM    MONOCYTES 5.70 06/09/2023 11:57 AM    EOSINOPHILS 1.70 06/09/2023 11:57 AM    BASOPHILS 0.40 06/09/2023 11:57 AM    HYPOCHROMIA 1+ 06/20/2013 07:10 PM        Lab Results   Component Value Date/Time    HBA1C 5.2 11/08/2023 09:59 AM        Imaging:   I personally reviewed following images, these are my reads  Right hip x-ray 11/7/2023: No acute osseous abnormalities  Lumbar x-ray 12/19/2019: No acute osseous abnormalities    IMAGING radiology reads. I reviewed the following radiology reads                    Results for orders placed during the hospital encounter of 07/21/18    MR-CERVICAL SPINE-WITH & W/O    Impression  1.  No evidence of demyelinating disease or mass in the cervical spine  2.  Enhancement and T2 hyperintensity in the BILATERAL cervical nerve roots moderately suspicious for chronic inflammatory demyelinating polyneuropathy. Other demyelinating polyneuropathies as well as neurofibromatosis type I could have a similar  appearance in the appropriate clinical setting.  3.  Mild multilevel multifactorial degenerative changes  4.  No areas of high-grade central canal narrowing  5.  Areas of central canal and neural foraminal narrowing as described above                                              Results for orders placed during the hospital encounter of 03/01/17    DX-CHEST-2 VIEWS    Impression  No active disease.                   Results for orders placed during the hospital encounter of 11/10/23    DX-KNEE 3 VIEWS RIGHT    Impression  1. No acute osseous abnormality.     Results for orders placed during the hospital encounter of 12/19/19    DX-LUMBAR SPINE-2 OR 3 VIEWS    Impression  No compression deformity or acute fracture is  identified.               Results for orders placed during the hospital encounter of 19    DX-THORACIC SPINE-2 VIEWS    Impression  1.  No compression deformity or acute fracture.    2.  Levoscoliosis.              ASSESSMENT AND PLAN:  Jennifer Gee   : 1985   Past medical history of vitamin D deficiency steatosis of the liver, BMI 41, obstructive sleep apnea, PCOS, PTSD, bipolar, asthma, hyperglycemia      1. Chronic bilateral low back pain with right-sided sciatica  Referral to Physical Therapy    DX-LUMBAR SPINE-2 OR 3 VIEWS      2. Decreased sensation of lower extremity        3. Dorsalgia of thoracic region  DX-THORACIC SPINE-2 VIEWS      4. Chronic right hip pain  Referral to Physical Therapy      5. Body mass index 40.0-44.9, adult (HCC)        6. Vitamin D deficiency        7. Positive depression screening  URINE DRUG SCREEN      8. Charcot-Michelle-Tooth disease of neuronal type        9. Neck pain  DX-CERVICAL SPINE-2 OR 3 VIEWS      10. Exercise counseling        11. Marijuana use  URINE DRUG SCREEN      12. Abnormal gait  Referral to Physical Therapy            Patient with historical and clinical evidence consistent with multiple pain generators.    Chronic low back pain unspecified with Bilateral leg weakness possibly secondary to CMT. possible findings of sacroiliac joint dysfunction with some positive findings on examination.  Also poor single-leg balance and weak hip abductor's and adductor's which may be contributing to back pain.  Will obtain XR. Pending MRI of back and hip by PCP.  Neck pain: Reported radiating referred pain; will obtain x-ray  Abnormal gait: Observed valgus knees with ambulation which may be secondary to muscle imbalance; recommend physical therapy  Recommend follow-up with PCP to discuss medication options for pain control such as venlafaxine, Cymbalta, or Elavil.  Please work with behavioral health specialist as patient reported having a history of manic  episode. Prescribed meds by PCP and/or   Accessibility to nonprescription medications: obtain UDS. Active marijuana use.  Therefore, will not prescribed medications at this time  History of PTSD and bipolar: PHQ9 positive with no SI; can affect motivation for exercise and activity and therapy; established with primary care physician  Exercising counseling discussed.  Extensive discussion regarding treatment options, at this time recommending the following:    Orders Placed This Encounter    DX-CERVICAL SPINE-2 OR 3 VIEWS    DX-LUMBAR SPINE-2 OR 3 VIEWS    DX-THORACIC SPINE-2 VIEWS    URINE DRUG SCREEN    Referral to Physical Therapy      -Medications/Modalities:   Previously prescribed medications  -Limitations:    Activity as tolerated  -Brace/orthotic/assistive devices: Not indicated at this time  -Therapy (PT/OT/Aquatherapy): ordered to focus on strengthening and stretching.    -Home exercise program: encouraged and provided home exercises of regular strengthening and stretching.    -Interventional program: not indicated at this time  -Diagnostic workup: reviewed today as above; ordered X-ray  -Referrals: none required at this time  -Outside records requested: none required at this time      Follow-up:     Return to clinic in 12 weeks for follow-up of symptomatology or after her previously ordered MRI  Patient expressed understanding of the management plan. Patient (and Family Members) was/were encouraged to call if any worries, issues, problems or concerns prior to the next visit     Please note that this dictation was created using voice recognition software. I have made every reasonable attempt to correct obvious errors but there may be errors of grammar and content that I may have overlooked prior to finalization of this note.      Chente Jaramillo,   Physical Medicine and Rehabilitation  [facility]         PARISH Estrada P.A.-C.   Romelia Fajardo P.A.-C.

## 2024-07-03 ENCOUNTER — APPOINTMENT (OUTPATIENT)
Dept: MEDICAL GROUP | Facility: CLINIC | Age: 39
End: 2024-07-03
Payer: COMMERCIAL

## 2024-07-03 ENCOUNTER — APPOINTMENT (OUTPATIENT)
Dept: RADIOLOGY | Facility: MEDICAL CENTER | Age: 39
End: 2024-07-03
Attending: GENERAL PRACTICE
Payer: COMMERCIAL

## 2024-07-03 DIAGNOSIS — G89.29 CHRONIC BILATERAL LOW BACK PAIN WITH RIGHT-SIDED SCIATICA: ICD-10-CM

## 2024-07-03 DIAGNOSIS — M54.6 DORSALGIA OF THORACIC REGION: ICD-10-CM

## 2024-07-03 DIAGNOSIS — M54.41 CHRONIC BILATERAL LOW BACK PAIN WITH RIGHT-SIDED SCIATICA: ICD-10-CM

## 2024-07-03 DIAGNOSIS — M54.2 NECK PAIN: ICD-10-CM

## 2024-07-03 PROCEDURE — 72100 X-RAY EXAM L-S SPINE 2/3 VWS: CPT

## 2024-07-03 PROCEDURE — 72070 X-RAY EXAM THORAC SPINE 2VWS: CPT

## 2024-07-03 PROCEDURE — 72040 X-RAY EXAM NECK SPINE 2-3 VW: CPT

## 2024-07-11 DIAGNOSIS — K21.9 GASTROESOPHAGEAL REFLUX DISEASE WITHOUT ESOPHAGITIS: ICD-10-CM

## 2024-07-11 DIAGNOSIS — L70.0 ACNE VULGARIS: ICD-10-CM

## 2024-07-11 DIAGNOSIS — S93.402A SPRAIN OF LEFT ANKLE, UNSPECIFIED LIGAMENT, INITIAL ENCOUNTER: ICD-10-CM

## 2024-07-11 RX ORDER — IBUPROFEN 800 MG/1
800 TABLET ORAL EVERY 8 HOURS PRN
Qty: 40 TABLET | Refills: 0 | Status: SHIPPED | OUTPATIENT
Start: 2024-07-11

## 2024-07-11 RX ORDER — OMEPRAZOLE 20 MG/1
20 CAPSULE, DELAYED RELEASE ORAL DAILY
Qty: 90 CAPSULE | Refills: 2 | Status: SHIPPED | OUTPATIENT
Start: 2024-07-11

## 2024-07-11 RX ORDER — TETRACYCLINE HYDROCHLORIDE 250 MG/1
250 CAPSULE ORAL 2 TIMES DAILY
Qty: 180 CAPSULE | Refills: 0 | Status: SHIPPED | OUTPATIENT
Start: 2024-07-11

## 2024-07-19 ENCOUNTER — APPOINTMENT (OUTPATIENT)
Dept: RADIOLOGY | Facility: MEDICAL CENTER | Age: 39
End: 2024-07-19
Attending: PHYSICIAN ASSISTANT
Payer: COMMERCIAL

## 2024-07-19 DIAGNOSIS — G60.0 CHARCOT-MARIE-TOOTH DISEASE OF NEURONAL TYPE: ICD-10-CM

## 2024-07-19 PROCEDURE — 73721 MRI JNT OF LWR EXTRE W/O DYE: CPT

## 2024-07-19 PROCEDURE — 72148 MRI LUMBAR SPINE W/O DYE: CPT

## 2024-07-22 ENCOUNTER — PATIENT MESSAGE (OUTPATIENT)
Dept: MEDICAL GROUP | Facility: CLINIC | Age: 39
End: 2024-07-22
Payer: COMMERCIAL

## 2024-07-22 DIAGNOSIS — M25.859 FEMORAL ACETABULAR IMPINGEMENT: ICD-10-CM

## 2024-08-10 ENCOUNTER — HOSPITAL ENCOUNTER (OUTPATIENT)
Dept: RADIOLOGY | Facility: MEDICAL CENTER | Age: 39
End: 2024-08-10
Attending: PHYSICIAN ASSISTANT
Payer: COMMERCIAL

## 2024-08-10 DIAGNOSIS — N64.52 BREAST DISCHARGE: ICD-10-CM

## 2024-08-10 DIAGNOSIS — R79.89 ELEVATED PROLACTIN LEVEL: ICD-10-CM

## 2024-08-10 PROCEDURE — 70553 MRI BRAIN STEM W/O & W/DYE: CPT

## 2024-08-10 PROCEDURE — A9579 GAD-BASE MR CONTRAST NOS,1ML: HCPCS | Mod: JZ | Performed by: PHYSICIAN ASSISTANT

## 2024-08-10 PROCEDURE — 700117 HCHG RX CONTRAST REV CODE 255: Mod: JZ | Performed by: PHYSICIAN ASSISTANT

## 2024-08-10 RX ADMIN — GADOTERIDOL 20 ML: 279.3 INJECTION, SOLUTION INTRAVENOUS at 14:54

## 2024-08-13 ENCOUNTER — APPOINTMENT (OUTPATIENT)
Dept: MEDICAL GROUP | Facility: CLINIC | Age: 39
End: 2024-08-13
Payer: COMMERCIAL

## 2024-10-22 DIAGNOSIS — L70.0 ACNE VULGARIS: ICD-10-CM

## 2024-10-22 RX ORDER — TETRACYCLINE HYDROCHLORIDE 250 MG/1
250 CAPSULE ORAL 2 TIMES DAILY
Qty: 180 CAPSULE | Refills: 1 | Status: SHIPPED | OUTPATIENT
Start: 2024-10-22

## 2024-10-30 DIAGNOSIS — E28.2 PCOS (POLYCYSTIC OVARIAN SYNDROME): ICD-10-CM

## 2025-01-29 DIAGNOSIS — E28.2 PCOS (POLYCYSTIC OVARIAN SYNDROME): ICD-10-CM

## 2025-01-29 DIAGNOSIS — S93.402A SPRAIN OF LEFT ANKLE, UNSPECIFIED LIGAMENT, INITIAL ENCOUNTER: ICD-10-CM

## 2025-01-30 RX ORDER — IBUPROFEN 800 MG/1
800 TABLET, FILM COATED ORAL EVERY 8 HOURS PRN
Qty: 40 TABLET | Refills: 0 | Status: SHIPPED | OUTPATIENT
Start: 2025-01-30

## 2025-01-30 NOTE — TELEPHONE ENCOUNTER
Received request via: Patient    Was the patient seen in the last year in this department? Yes    Does the patient have an active prescription (recently filled or refills available) for medication(s) requested?  yes    Pharmacy Name: [facility] in [place]    Does the patient have long-term Plus and need 100-day supply? (This applies to ALL medications) Patient does not have SCP

## 2025-02-21 ENCOUNTER — HOSPITAL ENCOUNTER (EMERGENCY)
Facility: MEDICAL CENTER | Age: 40
End: 2025-02-21
Attending: EMERGENCY MEDICINE
Payer: COMMERCIAL

## 2025-02-21 ENCOUNTER — APPOINTMENT (OUTPATIENT)
Dept: RADIOLOGY | Facility: MEDICAL CENTER | Age: 40
End: 2025-02-21
Attending: EMERGENCY MEDICINE
Payer: COMMERCIAL

## 2025-02-21 VITALS
OXYGEN SATURATION: 95 % | BODY MASS INDEX: 41.62 KG/M2 | RESPIRATION RATE: 17 BRPM | DIASTOLIC BLOOD PRESSURE: 109 MMHG | TEMPERATURE: 98.9 F | SYSTOLIC BLOOD PRESSURE: 160 MMHG | HEIGHT: 62 IN | HEART RATE: 83 BPM | WEIGHT: 226.19 LBS

## 2025-02-21 DIAGNOSIS — R19.7 DIARRHEA, UNSPECIFIED TYPE: ICD-10-CM

## 2025-02-21 DIAGNOSIS — R11.2 NAUSEA AND VOMITING, UNSPECIFIED VOMITING TYPE: ICD-10-CM

## 2025-02-21 LAB
ABO GROUP BLD: NORMAL
ALBUMIN SERPL BCP-MCNC: 4.1 G/DL (ref 3.2–4.9)
ALBUMIN/GLOB SERPL: 1.4 G/DL
ALP SERPL-CCNC: 83 U/L (ref 30–99)
ALT SERPL-CCNC: 59 U/L (ref 2–50)
ANION GAP SERPL CALC-SCNC: 14 MMOL/L (ref 7–16)
APTT PPP: 25.4 SEC (ref 24.7–36)
AST SERPL-CCNC: 31 U/L (ref 12–45)
BASOPHILS # BLD AUTO: 0.3 % (ref 0–1.8)
BASOPHILS # BLD: 0.04 K/UL (ref 0–0.12)
BILIRUB SERPL-MCNC: <0.2 MG/DL (ref 0.1–1.5)
BLD GP AB SCN SERPL QL: NORMAL
BUN SERPL-MCNC: 11 MG/DL (ref 8–22)
CALCIUM ALBUM COR SERPL-MCNC: 9.2 MG/DL (ref 8.5–10.5)
CALCIUM SERPL-MCNC: 9.3 MG/DL (ref 8.5–10.5)
CHLORIDE SERPL-SCNC: 102 MMOL/L (ref 96–112)
CO2 SERPL-SCNC: 21 MMOL/L (ref 20–33)
CREAT SERPL-MCNC: 0.66 MG/DL (ref 0.5–1.4)
EOSINOPHIL # BLD AUTO: 0.39 K/UL (ref 0–0.51)
EOSINOPHIL NFR BLD: 3.2 % (ref 0–6.9)
ERYTHROCYTE [DISTWIDTH] IN BLOOD BY AUTOMATED COUNT: 42.9 FL (ref 35.9–50)
GFR SERPLBLD CREATININE-BSD FMLA CKD-EPI: 114 ML/MIN/1.73 M 2
GLOBULIN SER CALC-MCNC: 2.9 G/DL (ref 1.9–3.5)
GLUCOSE SERPL-MCNC: 105 MG/DL (ref 65–99)
HCG SERPL QL: NEGATIVE
HCT VFR BLD AUTO: 45.1 % (ref 37–47)
HGB BLD-MCNC: 15.3 G/DL (ref 12–16)
IMM GRANULOCYTES # BLD AUTO: 0.04 K/UL (ref 0–0.11)
IMM GRANULOCYTES NFR BLD AUTO: 0.3 % (ref 0–0.9)
INR PPP: 0.91 (ref 0.87–1.13)
LIPASE SERPL-CCNC: 25 U/L (ref 11–82)
LYMPHOCYTES # BLD AUTO: 2.29 K/UL (ref 1–4.8)
LYMPHOCYTES NFR BLD: 18.9 % (ref 22–41)
MCH RBC QN AUTO: 28.8 PG (ref 27–33)
MCHC RBC AUTO-ENTMCNC: 33.9 G/DL (ref 32.2–35.5)
MCV RBC AUTO: 84.9 FL (ref 81.4–97.8)
MONOCYTES # BLD AUTO: 0.6 K/UL (ref 0–0.85)
MONOCYTES NFR BLD AUTO: 5 % (ref 0–13.4)
NEUTROPHILS # BLD AUTO: 8.75 K/UL (ref 1.82–7.42)
NEUTROPHILS NFR BLD: 72.3 % (ref 44–72)
NRBC # BLD AUTO: 0 K/UL
NRBC BLD-RTO: 0 /100 WBC (ref 0–0.2)
PLATELET # BLD AUTO: 381 K/UL (ref 164–446)
PMV BLD AUTO: 9 FL (ref 9–12.9)
POTASSIUM SERPL-SCNC: 3.9 MMOL/L (ref 3.6–5.5)
PROT SERPL-MCNC: 7 G/DL (ref 6–8.2)
PROTHROMBIN TIME: 12.3 SEC (ref 12–14.6)
RBC # BLD AUTO: 5.31 M/UL (ref 4.2–5.4)
RH BLD: NORMAL
SODIUM SERPL-SCNC: 137 MMOL/L (ref 135–145)
WBC # BLD AUTO: 12.1 K/UL (ref 4.8–10.8)

## 2025-02-21 PROCEDURE — 86901 BLOOD TYPING SEROLOGIC RH(D): CPT

## 2025-02-21 PROCEDURE — 96375 TX/PRO/DX INJ NEW DRUG ADDON: CPT

## 2025-02-21 PROCEDURE — 700105 HCHG RX REV CODE 258: Performed by: EMERGENCY MEDICINE

## 2025-02-21 PROCEDURE — 83690 ASSAY OF LIPASE: CPT

## 2025-02-21 PROCEDURE — 36415 COLL VENOUS BLD VENIPUNCTURE: CPT

## 2025-02-21 PROCEDURE — 74177 CT ABD & PELVIS W/CONTRAST: CPT

## 2025-02-21 PROCEDURE — 700111 HCHG RX REV CODE 636 W/ 250 OVERRIDE (IP): Mod: JZ | Performed by: EMERGENCY MEDICINE

## 2025-02-21 PROCEDURE — 700117 HCHG RX CONTRAST REV CODE 255: Performed by: EMERGENCY MEDICINE

## 2025-02-21 PROCEDURE — 86900 BLOOD TYPING SEROLOGIC ABO: CPT

## 2025-02-21 PROCEDURE — 86850 RBC ANTIBODY SCREEN: CPT

## 2025-02-21 PROCEDURE — 96374 THER/PROPH/DIAG INJ IV PUSH: CPT

## 2025-02-21 PROCEDURE — 80053 COMPREHEN METABOLIC PANEL: CPT

## 2025-02-21 PROCEDURE — 85610 PROTHROMBIN TIME: CPT

## 2025-02-21 PROCEDURE — 85025 COMPLETE CBC W/AUTO DIFF WBC: CPT

## 2025-02-21 PROCEDURE — 84703 CHORIONIC GONADOTROPIN ASSAY: CPT

## 2025-02-21 PROCEDURE — 85730 THROMBOPLASTIN TIME PARTIAL: CPT

## 2025-02-21 PROCEDURE — 99285 EMERGENCY DEPT VISIT HI MDM: CPT

## 2025-02-21 RX ORDER — DICYCLOMINE HYDROCHLORIDE 10 MG/1
10 CAPSULE ORAL
Qty: 30 CAPSULE | Refills: 0 | Status: SHIPPED | OUTPATIENT
Start: 2025-02-21

## 2025-02-21 RX ORDER — SODIUM CHLORIDE 9 MG/ML
1000 INJECTION, SOLUTION INTRAVENOUS ONCE
Status: COMPLETED | OUTPATIENT
Start: 2025-02-21 | End: 2025-02-21

## 2025-02-21 RX ORDER — ONDANSETRON 4 MG/1
4 TABLET, ORALLY DISINTEGRATING ORAL EVERY 6 HOURS PRN
Qty: 10 TABLET | Refills: 0 | Status: SHIPPED | OUTPATIENT
Start: 2025-02-21

## 2025-02-21 RX ORDER — MORPHINE SULFATE 4 MG/ML
4 INJECTION INTRAVENOUS ONCE
Status: COMPLETED | OUTPATIENT
Start: 2025-02-21 | End: 2025-02-21

## 2025-02-21 RX ORDER — ONDANSETRON 2 MG/ML
4 INJECTION INTRAMUSCULAR; INTRAVENOUS ONCE
Status: COMPLETED | OUTPATIENT
Start: 2025-02-21 | End: 2025-02-21

## 2025-02-21 RX ORDER — FAMOTIDINE 20 MG/1
20 TABLET, FILM COATED ORAL 2 TIMES DAILY
Qty: 60 TABLET | Refills: 0 | Status: SHIPPED | OUTPATIENT
Start: 2025-02-21

## 2025-02-21 RX ADMIN — SODIUM CHLORIDE 1000 ML: 9 INJECTION, SOLUTION INTRAVENOUS at 11:19

## 2025-02-21 RX ADMIN — IOHEXOL 100 ML: 350 INJECTION, SOLUTION INTRAVENOUS at 13:00

## 2025-02-21 RX ADMIN — ONDANSETRON 4 MG: 2 INJECTION INTRAMUSCULAR; INTRAVENOUS at 11:18

## 2025-02-21 RX ADMIN — MORPHINE SULFATE 4 MG: 4 INJECTION, SOLUTION INTRAMUSCULAR; INTRAVENOUS at 11:17

## 2025-02-21 ASSESSMENT — FIBROSIS 4 INDEX: FIB4 SCORE: 0.52

## 2025-02-21 NOTE — ED NOTES
Pt ambulatory to restroom and back. Pt placed back on monitors and states they have no further needs at his time.

## 2025-02-21 NOTE — ED PROVIDER NOTES
"ED Provider Note    CHIEF COMPLAINT  Chief Complaint   Patient presents with    Abdominal Pain     X since last night        EXTERNAL RECORDS REVIEWED  Outpatient Notes   [facility]    HPI/ROS  LIMITATION TO HISTORY   None  OUTSIDE HISTORIAN(S):  None    Jennifer Anai Gee is a 39 y.o. female who presents here for evaluation of abdominal pain.  Patient states that she has had abdominal pain in the upper abdomen that has been persistent.  She had episodes of vomiting, and diarrhea.  Patient also had episode where she threw up \"blood.\"  Patient endorses doing cocaine, and has some bleeding from the left nare as well.  She has no fever or chills, no chest pain shortness of breath, and no back pain.    PAST MEDICAL HISTORY   has a past medical history of Bladder infection (2012), Borderline personality disorder (HCC), Chickenpox, COVID-19, COVID-19 (9/21/2021), Depression, Gastritis, History of dental surgery, Influenza, Obesity, Pneumonia (2007), and Restless leg syndrome.    SURGICAL HISTORY   has a past surgical history that includes tonsillectomy (Bilateral, 08/05/2019); uvulopharyngopalatoplasty; and cholecystectomy.    FAMILY HISTORY  Family History   Problem Relation Age of Onset    Hyperlipidemia Mother     Hypertension Mother     Alcohol/Drug Father     Heart Failure Father     Heart Disease Maternal Grandfather     Cancer Maternal Grandfather         Colon    Stroke Neg Hx     Diabetes Neg Hx        SOCIAL HISTORY  Social History     Tobacco Use    Smoking status: Never    Smokeless tobacco: Never   Vaping Use    Vaping status: Some Days    Substances: THC, CBD   Substance and Sexual Activity    Alcohol use: No    Drug use: Yes     Types: Inhaled, Marijuana     Comment: medical card, 3 puffs/day/ cocaine    Sexual activity: Yes     Partners: Female, Male       CURRENT MEDICATIONS  Home Medications    **Home medications have not yet been reviewed for this encounter**       Audit from Redirected " "Encounters    **Home medications have not yet been reviewed for this encounter**         ALLERGIES  No Known Allergies    PHYSICAL EXAM  VITAL SIGNS: BP (!) 158/91   Pulse 78   Temp 37.2 °C (98.9 °F) (Temporal)   Resp 18   Ht 1.575 m (5' 2\")   Wt 103 kg (226 lb 3.1 oz)   LMP 02/13/2025 (Exact Date)   SpO2 96%   BMI 41.37 kg/m²    Constitutional: Well developed, well nourished. No acute distress.  HEENT: Normocephalic, atraumatic. Posterior pharynx clear and moist.  Eyes:  EOMI. Normal sclera.  Neck: Supple, Full range of motion, nontender.  Chest/Pulmonary: clear to ausculation. Symmetrical expansion.   Cardio: Regular rate and rhythm with no murmur.   Abdomen: Soft, mild epigastric tenderness, no peritoneal signs. No guarding. No palpable masses.  Back: No CVA tenderness, nontender midline, no step offs.  Musculoskeletal: No deformity, no edema, neurovascular intact.   Neuro: Clear speech, appropriate, cooperative, cranial nerves II-XII grossly intact.  Psych: Normal mood and affect      EKG/LABS  Results for orders placed or performed during the hospital encounter of 02/21/25   COD (ADULT)    Collection Time: 02/21/25 10:01 AM   Result Value Ref Range    ABO Grouping Only A     Rh Grouping Only POS     Antibody Screen-Cod NEG    CBC WITH DIFFERENTIAL    Collection Time: 02/21/25 10:01 AM   Result Value Ref Range    WBC 12.1 (H) 4.8 - 10.8 K/uL    RBC 5.31 4.20 - 5.40 M/uL    Hemoglobin 15.3 12.0 - 16.0 g/dL    Hematocrit 45.1 37.0 - 47.0 %    MCV 84.9 81.4 - 97.8 fL    MCH 28.8 27.0 - 33.0 pg    MCHC 33.9 32.2 - 35.5 g/dL    RDW 42.9 35.9 - 50.0 fL    Platelet Count 381 164 - 446 K/uL    MPV 9.0 9.0 - 12.9 fL    Neutrophils-Polys 72.30 (H) 44.00 - 72.00 %    Lymphocytes 18.90 (L) 22.00 - 41.00 %    Monocytes 5.00 0.00 - 13.40 %    Eosinophils 3.20 0.00 - 6.90 %    Basophils 0.30 0.00 - 1.80 %    Immature Granulocytes 0.30 0.00 - 0.90 %    Nucleated RBC 0.00 0.00 - 0.20 /100 WBC    Neutrophils (Absolute) " 8.75 (H) 1.82 - 7.42 K/uL    Lymphs (Absolute) 2.29 1.00 - 4.80 K/uL    Monos (Absolute) 0.60 0.00 - 0.85 K/uL    Eos (Absolute) 0.39 0.00 - 0.51 K/uL    Baso (Absolute) 0.04 0.00 - 0.12 K/uL    Immature Granulocytes (abs) 0.04 0.00 - 0.11 K/uL    NRBC (Absolute) 0.00 K/uL   COMP METABOLIC PANEL    Collection Time: 02/21/25 10:01 AM   Result Value Ref Range    Sodium 137 135 - 145 mmol/L    Potassium 3.9 3.6 - 5.5 mmol/L    Chloride 102 96 - 112 mmol/L    Co2 21 20 - 33 mmol/L    Anion Gap 14.0 7.0 - 16.0    Glucose 105 (H) 65 - 99 mg/dL    Bun 11 8 - 22 mg/dL    Creatinine 0.66 0.50 - 1.40 mg/dL    Calcium 9.3 8.5 - 10.5 mg/dL    Correct Calcium 9.2 8.5 - 10.5 mg/dL    AST(SGOT) 31 12 - 45 U/L    ALT(SGPT) 59 (H) 2 - 50 U/L    Alkaline Phosphatase 83 30 - 99 U/L    Total Bilirubin <0.2 0.1 - 1.5 mg/dL    Albumin 4.1 3.2 - 4.9 g/dL    Total Protein 7.0 6.0 - 8.2 g/dL    Globulin 2.9 1.9 - 3.5 g/dL    A-G Ratio 1.4 g/dL   LIPASE    Collection Time: 02/21/25 10:01 AM   Result Value Ref Range    Lipase 25 11 - 82 U/L   PROTHROMBIN TIME    Collection Time: 02/21/25 10:01 AM   Result Value Ref Range    PT 12.3 12.0 - 14.6 sec    INR 0.91 0.87 - 1.13   APTT    Collection Time: 02/21/25 10:01 AM   Result Value Ref Range    APTT 25.4 24.7 - 36.0 sec   ESTIMATED GFR    Collection Time: 02/21/25 10:01 AM   Result Value Ref Range    GFR (CKD-EPI) 114 >60 mL/min/1.73 m 2   HCG QUAL SERUM    Collection Time: 02/21/25 10:01 AM   Result Value Ref Range    Beta-Hcg Qualitative Serum Negative Negative         RADIOLOGY/PROCEDURES   I have independently interpreted the diagnostic imaging associated with this visit and am waiting the final reading from the radiologist.   My preliminary interpretation is as follows: below     Radiologist interpretation:  CT-ABDOMEN-PELVIS WITH   Final Result         1. Hepatomegaly with fatty infiltration of the liver.   2. Atherosclerosis.   3. No evidence for bowel obstruction, diverticulitis, or  appendicitis.   4. Probable fibroid uterus.          COURSE & MEDICAL DECISION MAKING    ASSESSMENT, COURSE AND PLAN  Care Narrative: This is a 39-year-old female here for evaluation of abdominal pain, nausea and vomiting.  The patient has no acute finding on CT scan or blood work.  She has not had any vomiting since being here.  She has not had any hematemesis.  I believe this to be more of streaking type blood as she has no destabilization of vital signs, has a normal H&H.  Takes no anticoagulants.        DISPOSITION AND DISCUSSIONS  I have discussed management of the patient with the following physicians and OCTAVIO's: None    Discussion of management with other Q or appropriate source(s): None    Escalation of care considered, and ultimately not performed: None    Barriers to care at this time, including but not limited to: None.     Decision tools and prescription drugs considered including, but not limited to: None.    FINAL DIAGNOSIS  Abdominal pain  Vomiting diarrhea     Electronically signed by: Gerardo Miranda D.O., 2/21/2025 11:10 AM

## 2025-02-21 NOTE — Clinical Note
Jennifer Gee was seen and treated in our emergency department on 2/21/2025.  She may return to work on 02/24/2025.       If you have any questions or concerns, please don't hesitate to call.      Gerardo Miranda D.O.

## 2025-02-21 NOTE — ED NOTES
Patient discharged per order. Oral and written discharge instructions reviewed. IV removed. Medications sent to home pharmacy. New medications reviewed. All belongings accounted for and taken with patient. Questions answered, and patient agrees with discharge plan. Encouraged to follow up with PCP. Ambulatory to lobby.

## 2025-02-21 NOTE — ED TRIAGE NOTES
"Chief Complaint   Patient presents with    Abdominal Pain     X since last night      Pt reports that she has been having lower abd pain since last night with an episode on bright ref blood in her vomit this morning. GI Bleed protocol ordered, patient denies daily drinking but states she did \"2 8 balls of cocaine\" this weekend. Pt ax0x4, ambulatory to triage. Pt educated on wait times and triage process. Sent to lab area for draw.     BP (!) 195/117   Pulse 99   Temp 37.2 °C (98.9 °F) (Temporal)   Resp 18   Ht 1.575 m (5' 2\")   Wt 103 kg (226 lb 3.1 oz)   LMP 02/13/2025 (Exact Date)   SpO2 98%   BMI 41.37 kg/m²     "

## 2025-03-11 DIAGNOSIS — K21.9 GASTROESOPHAGEAL REFLUX DISEASE WITHOUT ESOPHAGITIS: ICD-10-CM

## 2025-03-11 RX ORDER — OMEPRAZOLE 20 MG/1
20 CAPSULE, DELAYED RELEASE ORAL DAILY
Qty: 90 CAPSULE | Refills: 0 | Status: SHIPPED | OUTPATIENT
Start: 2025-03-11

## 2025-03-11 NOTE — TELEPHONE ENCOUNTER
Received request via: Patient    Was the patient seen in the last year in this department? Yes    Does the patient have an active prescription (recently filled or refills available) for medication(s) requested?  yes    Pharmacy Name: [facility] in [place]    Does the patient have long-term Plus and need 100-day supply? (This applies to ALL medications) Patient does not have SCP    Last Office Visit: 9/12/24  Last Labs: 2/21/25

## 2025-04-26 DIAGNOSIS — S93.402A SPRAIN OF LEFT ANKLE, UNSPECIFIED LIGAMENT, INITIAL ENCOUNTER: ICD-10-CM

## 2025-04-26 DIAGNOSIS — E28.2 PCOS (POLYCYSTIC OVARIAN SYNDROME): ICD-10-CM

## 2025-04-26 DIAGNOSIS — K21.9 GASTROESOPHAGEAL REFLUX DISEASE WITHOUT ESOPHAGITIS: ICD-10-CM

## 2025-04-28 RX ORDER — IBUPROFEN 800 MG/1
800 TABLET, FILM COATED ORAL EVERY 8 HOURS PRN
Qty: 40 TABLET | Refills: 0 | Status: SHIPPED | OUTPATIENT
Start: 2025-04-28

## 2025-04-28 RX ORDER — OMEPRAZOLE 20 MG/1
20 CAPSULE, DELAYED RELEASE ORAL DAILY
Qty: 90 CAPSULE | Refills: 0 | Status: SHIPPED | OUTPATIENT
Start: 2025-04-28

## 2025-04-28 NOTE — TELEPHONE ENCOUNTER
Received request via: Pharmacy    Was the patient seen in the last year in this department? Yes    Does the patient have an active prescription (recently filled or refills available) for medication(s) requested? No    [facility]    Does the patient have jail Plus and need 100-day supply? (This applies to ALL medications) Patient does not have SCP      Last seen- 6/18/24  Last labs- 2/21/25

## 2025-05-01 ENCOUNTER — APPOINTMENT (OUTPATIENT)
Dept: MEDICAL GROUP | Facility: CLINIC | Age: 40
End: 2025-05-01
Payer: COMMERCIAL

## 2025-05-21 ENCOUNTER — TELEMEDICINE (OUTPATIENT)
Dept: MEDICAL GROUP | Facility: CLINIC | Age: 40
End: 2025-05-21
Payer: COMMERCIAL

## 2025-05-21 VITALS — BODY MASS INDEX: 41.96 KG/M2 | HEIGHT: 62 IN | WEIGHT: 228 LBS

## 2025-05-21 DIAGNOSIS — E78.49 OTHER HYPERLIPIDEMIA: ICD-10-CM

## 2025-05-21 DIAGNOSIS — F31.62 BIPOLAR DISORDER, CURRENT EPISODE MIXED, MODERATE (HCC): ICD-10-CM

## 2025-05-21 DIAGNOSIS — E55.9 VITAMIN D DEFICIENCY: ICD-10-CM

## 2025-05-21 DIAGNOSIS — R79.89 ELEVATED PROLACTIN LEVEL: ICD-10-CM

## 2025-05-21 DIAGNOSIS — M54.31 SCIATICA OF RIGHT SIDE: Primary | ICD-10-CM

## 2025-05-21 DIAGNOSIS — M25.551 PAIN OF RIGHT HIP: ICD-10-CM

## 2025-05-21 DIAGNOSIS — E78.00 ELEVATED LDL CHOLESTEROL LEVEL: ICD-10-CM

## 2025-05-21 DIAGNOSIS — R73.9 HYPERGLYCEMIA: ICD-10-CM

## 2025-05-21 DIAGNOSIS — G60.0 CHARCOT-MARIE-TOOTH DISEASE OF NEURONAL TYPE: ICD-10-CM

## 2025-05-21 DIAGNOSIS — L70.0 ACNE VULGARIS: ICD-10-CM

## 2025-05-21 PROBLEM — M54.16 LUMBAR RADICULOPATHY: Status: ACTIVE | Noted: 2024-07-17

## 2025-05-21 PROBLEM — M54.12 CERVICAL RADICULOPATHY: Status: ACTIVE | Noted: 2024-07-17

## 2025-05-21 PROCEDURE — 99214 OFFICE O/P EST MOD 30 MIN: CPT | Mod: 95 | Performed by: PHYSICIAN ASSISTANT

## 2025-05-21 RX ORDER — TETRACYCLINE HYDROCHLORIDE 500 MG/1
500 CAPSULE ORAL 4 TIMES DAILY
Qty: 180 CAPSULE | Refills: 1 | Status: SHIPPED | OUTPATIENT
Start: 2025-05-21

## 2025-05-21 ASSESSMENT — FIBROSIS 4 INDEX: FIB4 SCORE: 0.41

## 2025-05-21 NOTE — PROGRESS NOTES
"Virtual Visit: Established Patient   This visit was conducted via Teams using secure and encrypted videoconferencing technology.   The patient was in their home in the state of [place].    The patient's identity was confirmed and verbal consent was obtained for this virtual visit.    Subjective:     CC:   Chief Complaint   Patient presents with    Requesting Labs    Pain     Ciatica related pain     Jennifer Gee is a 39 y.o. female presenting for evaluation and management of:    Sciatic pain.  Patient states that she has been to physiatry and was told she needed to lose weight.  Patient states that she then set up an appointment with [facility] and has had improved success.  She states that she has been told that she has sciatica and was given a steroid pack at one point and states that it did help.  Patient indicates that she has been with physiatry and they have not provided any benefit.  She has since established with [place] [facility] and has had improved success.  She was told that although she had a negative MRI, her symptoms are most likely related to her sciatic nerve.  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  She has also been to see neurology in the past and has been told there is not much else that she can do and she may need to establish with pain management-which she was not at the time.  She does indicate that when she was given a steroid pack by either orthopedics or pain management, it was helpful her last set of labs were drawn in the ER in February glucose was mildly elevated.      Patient states that her blood pressure has been high at 155/98.  She states that she has felt increased emotion.  She states that her friend told her that she may have \"mercury poisoning\".  Patient is also due for follow-up lab work for her hyperlipidemia hyperglycemia vitamin D deficiency and did at one point have an elevated LDL level.  She also does have bipolar " and takes Lamictal and Latuda.    Patient indicates that her tetracycline 250 mg medication dose twice a day is no longer helpful for her acne and she is hoping for an increase in the medication.    ROS   Denies any other symptoms unless previously indicated    Current medicines (including changes today)  Current Medications[1]    Patient Active Problem List    Diagnosis Date Noted    Sciatica of right side 05/21/2025    Cervical radiculopathy 07/17/2024    Lumbar radiculopathy 07/17/2024    Breast discharge 06/18/2024    Weakness of right leg 06/18/2024    Tinea pedis of both feet 06/18/2024    Elevated prolactin level 11/06/2023    Chronic pain of right knee 11/06/2023    Pain of right hip 11/06/2023    Hyperglycemia 09/05/2023    Steatosis of liver 05/24/2023    Enlarged liver 05/24/2023    Epigastric pain 05/03/2023    Morbid obesity (HCC) 05/03/2023    Esophagitis 11/03/2022    Diverticulosis 11/03/2022    Hiatal hernia 11/03/2022    Other hemorrhoids 11/03/2022    Blood in stool 06/01/2022    PTSD (post-traumatic stress disorder) 06/01/2022    Body mass index 40.0-44.9, adult (HCC) 06/01/2022    Elevated LDL cholesterol level 02/15/2022    Seasonal allergies 08/19/2021    Pressure injury of contiguous region involving back and buttock, stage 2 ([facility]) 05/20/2021    Other hyperlipidemia 07/27/2020    Vitamin D deficiency 07/27/2020    Sprain of left ankle 05/26/2020    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    Insomnia 09/23/2019    MELISSA (obstructive sleep apnea) 03/11/2019    Non-smoker 03/11/2019    Gastroesophageal reflux disease 03/11/2019    Nonspecific abnormal electromyogram (EMG) 03/06/2018    Mild intermittent asthma without complication 01/22/2018    Rash of back 10/12/2017    Acute recurrent sinusitis 09/21/2017    Bipolar affective disorder (HCC) 02/16/2016    PCOS (polycystic ovarian syndrome) 02/16/2016    Acne vulgaris 02/16/2016    Intractable migraine without aura and without  "status migrainosus 01/01/2014        Objective:   Ht 1.575 m (5' 2\")   Wt 103 kg (228 lb)   BMI 41.70 kg/m²     Physical Exam:  Constitutional: Alert, no distress, well-groomed.  Skin: No rashes in visible areas.  Eye: Round. Conjunctiva clear, lids normal. No icterus.   ENMT: Lips pink without lesions, good dentition, moist mucous membranes. Phonation normal.  Neck: No masses, no thyromegaly. Moves freely without pain.  Respiratory: Unlabored respiratory effort, no cough or audible wheeze  Psych: Alert and oriented x3, normal affect and mood.     Assessment and Plan:   The following treatment plan was discussed:     1. Sciatica of right side  2. Pain of right hip  3. Charcot-Michelle-Tooth disease of neuronal type    Possible sciatic, also possible hip.  Previous MRIs should support potential hip problem but patient does have Charcot-Michelle-Tooth which will contribute to neuropathy.  Will obtain labs if glucose levels are controlled can consider steroid pack patient will need to keep her in office appointment for June 2.    4. Other hyperlipidemia  - Lipid Profile; Future  - Comp Metabolic Panel; Future  - TSH WITH REFLEX TO FT4; Future  5. Hyperglycemia  - Lipid Profile; Future  - Comp Metabolic Panel; Future  - TSH WITH REFLEX TO FT4; Future  - HEMOGLOBIN A1C; Future  6. Elevated LDL cholesterol level  - Lipid Profile; Future  - Comp Metabolic Panel; Future  - TSH WITH REFLEX TO FT4; Future  7. Vitamin D deficiency  - VITAMIN D,25 HYDROXY (DEFICIENCY); Future  8. Elevated prolactin level  - PROLACTIN; Future    Lab work ordered to evaluate further.  Follow-up with labs at June 2 appointment.  Patient does have a history of an elevated prolactin level and would like to have this evaluated.    9. Acne vulgaris  - tetracycline (SUMYCIN) 500 MG Cap; Take 1 Capsule by mouth 4 times a day.  Dispense: 180 Capsule; Refill: 1    Not controlled.  Will increase the tetracycline from 250 mg twice daily to 500 mg twice daily.  " If increased dose is not helping with symptoms, we will need to consider referral to dermatology.    10. Bipolar disorder, current episode mixed, moderate (HCC)    Patient does indicate an increased emotional state.  Difficult to know if this is related to her bipolar or if there is a secondary issue.  Patient feels that it may be Mercury poisoning but is not able to indicate whether she has had any Mercury exposure.  Will obtain thyroid testing to begin with if thyroid levels are normal can reconsider mercury levels.      Follow-up: No follow-ups on file.              [1]   Current Outpatient Medications   Medication Sig Dispense Refill    tetracycline (SUMYCIN) 500 MG Cap Take 1 Capsule by mouth 4 times a day. 180 Capsule 1    ibuprofen (MOTRIN) 800 MG Tab TAKE 1 TABLET BY MOUTH EVERY 8 HOURS AS NEEDED FOR MODERATE PAIN 40 Tablet 0    metformin (GLUCOPHAGE) 1000 MG tablet TAKE 1 TABLET BY MOUTH TWICE DAILY WITH MEALS 180 Tablet 0    omeprazole (PRILOSEC) 20 MG delayed-release capsule Take 1 capsule by mouth once daily 90 Capsule 0    methylPREDNISolone (MEDROL DOSEPAK) 4 MG Tablet Therapy Pack Follow schedule on package instructions. 21 Tablet 0    hydrocortisone 2.5 % Cream topical cream Apply a small amount to the affected area twice a day no more than 2 weeks 45 g 1    lurasidone (LATUDA) 20 MG Tab Take 40 mg by mouth with dinner.      Ketoconazole-Hydrocortisone 2-2.5 % Cream Apply a small amount to the affected area twice a day no more than 2 weeks. 60 g 1    traMADol (ULTRAM) 50 MG Tab TAKE 1 TABLET BY MOUTH EVERY 4 HOURS AS NEEDED FOR PAIN FOR 7 DAYS      oxyCODONE-acetaminophen (PERCOCET) 5-325 MG Tab TAKE 1 TABLET BY MOUTH EVERY 4 HOURS AS NEEDED FOR MODERATE PAIN FOR 5 DAYS      traZODone (DESYREL) 100 MG Tab TAKE 1 TABLET BY MOUTH AT BEDTIME 90 Tablet 0    vitamin D2, Ergocalciferol, (DRISDOL) 1.25 MG (77247 UT) Cap capsule TAKE ONE CAPSULE BY MOUTH ONCE WEEKLY 12 Capsule 2    lamotrigine (LAMICTAL) 150  MG tablet Take 1 Tablet by mouth every day. 90 Tablet 0    cyclobenzaprine (FLEXERIL) 10 mg Tab Take 1 Tablet by mouth 3 times a day as needed for Moderate Pain. 90 Tablet 1    albuterol 108 (90 Base) MCG/ACT Aero Soln inhalation aerosol Inhale 2 Puffs every 6 hours as needed for Shortness of Breath. 18 g 4    Clonazepam 1 MG TABLET DISPERSIBLE       loratadine (CLARITIN) 10 MG Tab Take 1 Tablet by mouth every day. 30 Tablet 3    fluticasone (FLONASE) 50 MCG/ACT nasal spray Administer 2 Sprays into affected nostril(S) every day. 16 g 5    NON SPECIFIED Massage therapy for neuropathy. 1 Each 12     No current facility-administered medications for this visit.

## 2025-05-27 ENCOUNTER — RESULTS FOLLOW-UP (OUTPATIENT)
Dept: MEDICAL GROUP | Facility: CLINIC | Age: 40
End: 2025-05-27

## 2025-05-27 ENCOUNTER — HOSPITAL ENCOUNTER (OUTPATIENT)
Dept: LAB | Facility: MEDICAL CENTER | Age: 40
End: 2025-05-27
Attending: PHYSICIAN ASSISTANT
Payer: COMMERCIAL

## 2025-05-27 DIAGNOSIS — E78.00 ELEVATED LDL CHOLESTEROL LEVEL: ICD-10-CM

## 2025-05-27 DIAGNOSIS — E55.9 VITAMIN D DEFICIENCY: ICD-10-CM

## 2025-05-27 DIAGNOSIS — R73.9 HYPERGLYCEMIA: ICD-10-CM

## 2025-05-27 DIAGNOSIS — R79.89 ELEVATED PROLACTIN LEVEL: ICD-10-CM

## 2025-05-27 DIAGNOSIS — E78.49 OTHER HYPERLIPIDEMIA: ICD-10-CM

## 2025-05-27 LAB
25(OH)D3 SERPL-MCNC: 53 NG/ML (ref 30–100)
ALBUMIN SERPL BCP-MCNC: 3.8 G/DL (ref 3.2–4.9)
ALBUMIN/GLOB SERPL: 1.3 G/DL
ALP SERPL-CCNC: 92 U/L (ref 30–99)
ALT SERPL-CCNC: 28 U/L (ref 2–50)
ANION GAP SERPL CALC-SCNC: 12 MMOL/L (ref 7–16)
AST SERPL-CCNC: 22 U/L (ref 12–45)
BILIRUB SERPL-MCNC: <0.2 MG/DL (ref 0.1–1.5)
BUN SERPL-MCNC: 14 MG/DL (ref 8–22)
CALCIUM ALBUM COR SERPL-MCNC: 9.2 MG/DL (ref 8.5–10.5)
CALCIUM SERPL-MCNC: 9 MG/DL (ref 8.5–10.5)
CHLORIDE SERPL-SCNC: 104 MMOL/L (ref 96–112)
CHOLEST SERPL-MCNC: 173 MG/DL (ref 100–199)
CO2 SERPL-SCNC: 21 MMOL/L (ref 20–33)
CREAT SERPL-MCNC: 0.7 MG/DL (ref 0.5–1.4)
EST. AVERAGE GLUCOSE BLD GHB EST-MCNC: 111 MG/DL
FASTING STATUS PATIENT QL REPORTED: NORMAL
GFR SERPLBLD CREATININE-BSD FMLA CKD-EPI: 112 ML/MIN/1.73 M 2
GLOBULIN SER CALC-MCNC: 3 G/DL (ref 1.9–3.5)
GLUCOSE SERPL-MCNC: 90 MG/DL (ref 65–99)
HBA1C MFR BLD: 5.5 % (ref 4–5.6)
HDLC SERPL-MCNC: 32 MG/DL
LDLC SERPL CALC-MCNC: 118 MG/DL
POTASSIUM SERPL-SCNC: 4.1 MMOL/L (ref 3.6–5.5)
PROLACTIN SERPL-MCNC: 16.5 NG/ML (ref 2.8–26)
PROT SERPL-MCNC: 6.8 G/DL (ref 6–8.2)
SODIUM SERPL-SCNC: 137 MMOL/L (ref 135–145)
TRIGL SERPL-MCNC: 115 MG/DL (ref 0–149)
TSH SERPL DL<=0.005 MIU/L-ACNC: 3.25 UIU/ML (ref 0.38–5.33)

## 2025-05-27 PROCEDURE — 84443 ASSAY THYROID STIM HORMONE: CPT

## 2025-05-27 PROCEDURE — 84146 ASSAY OF PROLACTIN: CPT

## 2025-05-27 PROCEDURE — 83036 HEMOGLOBIN GLYCOSYLATED A1C: CPT

## 2025-05-27 PROCEDURE — 80053 COMPREHEN METABOLIC PANEL: CPT

## 2025-05-27 PROCEDURE — 36415 COLL VENOUS BLD VENIPUNCTURE: CPT

## 2025-05-27 PROCEDURE — 80061 LIPID PANEL: CPT

## 2025-05-27 PROCEDURE — 82306 VITAMIN D 25 HYDROXY: CPT

## 2025-05-30 SDOH — ECONOMIC STABILITY: FOOD INSECURITY: WITHIN THE PAST 12 MONTHS, YOU WORRIED THAT YOUR FOOD WOULD RUN OUT BEFORE YOU GOT MONEY TO BUY MORE.: SOMETIMES TRUE

## 2025-05-30 SDOH — ECONOMIC STABILITY: FOOD INSECURITY: WITHIN THE PAST 12 MONTHS, THE FOOD YOU BOUGHT JUST DIDN'T LAST AND YOU DIDN'T HAVE MONEY TO GET MORE.: NEVER TRUE

## 2025-05-30 SDOH — ECONOMIC STABILITY: INCOME INSECURITY: HOW HARD IS IT FOR YOU TO PAY FOR THE VERY BASICS LIKE FOOD, HOUSING, MEDICAL CARE, AND HEATING?: SOMEWHAT HARD

## 2025-05-30 SDOH — ECONOMIC STABILITY: INCOME INSECURITY: IN THE LAST 12 MONTHS, WAS THERE A TIME WHEN YOU WERE NOT ABLE TO PAY THE MORTGAGE OR RENT ON TIME?: YES

## 2025-05-30 SDOH — HEALTH STABILITY: PHYSICAL HEALTH: ON AVERAGE, HOW MANY MINUTES DO YOU ENGAGE IN EXERCISE AT THIS LEVEL?: 20 MIN

## 2025-05-30 SDOH — HEALTH STABILITY: PHYSICAL HEALTH: ON AVERAGE, HOW MANY DAYS PER WEEK DO YOU ENGAGE IN MODERATE TO STRENUOUS EXERCISE (LIKE A BRISK WALK)?: 3 DAYS

## 2025-05-30 SDOH — ECONOMIC STABILITY: TRANSPORTATION INSECURITY
IN THE PAST 12 MONTHS, HAS LACK OF TRANSPORTATION KEPT YOU FROM MEETINGS, WORK, OR FROM GETTING THINGS NEEDED FOR DAILY LIVING?: NO

## 2025-05-30 SDOH — ECONOMIC STABILITY: TRANSPORTATION INSECURITY
IN THE PAST 12 MONTHS, HAS THE LACK OF TRANSPORTATION KEPT YOU FROM MEDICAL APPOINTMENTS OR FROM GETTING MEDICATIONS?: NO

## 2025-05-30 ASSESSMENT — SOCIAL DETERMINANTS OF HEALTH (SDOH)
HOW OFTEN DO YOU ATTEND CHURCH OR RELIGIOUS SERVICES?: MORE THAN 4 TIMES PER YEAR
IN THE PAST 12 MONTHS, HAS THE ELECTRIC, GAS, OIL, OR WATER COMPANY THREATENED TO SHUT OFF SERVICE IN YOUR HOME?: NO
ARE YOU MARRIED, WIDOWED, DIVORCED, SEPARATED, NEVER MARRIED, OR LIVING WITH A PARTNER?: NEVER MARRIED
IN A TYPICAL WEEK, HOW MANY TIMES DO YOU TALK ON THE PHONE WITH FAMILY, FRIENDS, OR NEIGHBORS?: MORE THAN THREE TIMES A WEEK
DO YOU BELONG TO ANY CLUBS OR ORGANIZATIONS SUCH AS CHURCH GROUPS UNIONS, FRATERNAL OR ATHLETIC GROUPS, OR SCHOOL GROUPS?: YES
HOW OFTEN DO YOU GET TOGETHER WITH FRIENDS OR RELATIVES?: ONCE A WEEK
HOW OFTEN DO YOU ATTENT MEETINGS OF THE CLUB OR ORGANIZATION YOU BELONG TO?: MORE THAN 4 TIMES PER YEAR

## 2025-05-30 ASSESSMENT — LIFESTYLE VARIABLES
AUDIT-C TOTAL SCORE: 5
HOW MANY STANDARD DRINKS CONTAINING ALCOHOL DO YOU HAVE ON A TYPICAL DAY: 3 OR 4
HOW OFTEN DO YOU HAVE SIX OR MORE DRINKS ON ONE OCCASION: MONTHLY
HOW OFTEN DO YOU HAVE A DRINK CONTAINING ALCOHOL: 2-4 TIMES A MONTH
SKIP TO QUESTIONS 9-10: 0

## 2025-05-31 SDOH — HEALTH STABILITY: MENTAL HEALTH
STRESS IS WHEN SOMEONE FEELS TENSE, NERVOUS, ANXIOUS, OR CAN'T SLEEP AT NIGHT BECAUSE THEIR MIND IS TROUBLED. HOW STRESSED ARE YOU?: RATHER MUCH

## 2025-05-31 SDOH — ECONOMIC STABILITY: TRANSPORTATION INSECURITY
IN THE PAST 12 MONTHS, HAS LACK OF RELIABLE TRANSPORTATION KEPT YOU FROM MEDICAL APPOINTMENTS, MEETINGS, WORK OR FROM GETTING THINGS NEEDED FOR DAILY LIVING?: NO

## 2025-05-31 SDOH — ECONOMIC STABILITY: HOUSING INSECURITY
IN THE LAST 12 MONTHS, WAS THERE A TIME WHEN YOU DID NOT HAVE A STEADY PLACE TO SLEEP OR SLEPT IN A SHELTER (INCLUDING NOW)?: YES

## 2025-05-31 SDOH — HEALTH STABILITY: PHYSICAL HEALTH: ON AVERAGE, HOW MANY MINUTES DO YOU ENGAGE IN EXERCISE AT THIS LEVEL?: 20 MIN

## 2025-05-31 SDOH — HEALTH STABILITY: PHYSICAL HEALTH: ON AVERAGE, HOW MANY DAYS PER WEEK DO YOU ENGAGE IN MODERATE TO STRENUOUS EXERCISE (LIKE A BRISK WALK)?: 3 DAYS

## 2025-05-31 ASSESSMENT — SOCIAL DETERMINANTS OF HEALTH (SDOH)
HOW OFTEN DO YOU GET TOGETHER WITH FRIENDS OR RELATIVES?: ONCE A WEEK
DO YOU BELONG TO ANY CLUBS OR ORGANIZATIONS SUCH AS CHURCH GROUPS UNIONS, FRATERNAL OR ATHLETIC GROUPS, OR SCHOOL GROUPS?: YES
IN A TYPICAL WEEK, HOW MANY TIMES DO YOU TALK ON THE PHONE WITH FAMILY, FRIENDS, OR NEIGHBORS?: MORE THAN THREE TIMES A WEEK
ARE YOU MARRIED, WIDOWED, DIVORCED, SEPARATED, NEVER MARRIED, OR LIVING WITH A PARTNER?: NEVER MARRIED
HOW OFTEN DO YOU HAVE A DRINK CONTAINING ALCOHOL: 2-4 TIMES A MONTH
HOW OFTEN DO YOU HAVE SIX OR MORE DRINKS ON ONE OCCASION: MONTHLY
HOW HARD IS IT FOR YOU TO PAY FOR THE VERY BASICS LIKE FOOD, HOUSING, MEDICAL CARE, AND HEATING?: SOMEWHAT HARD
WITHIN THE PAST 12 MONTHS, YOU WORRIED THAT YOUR FOOD WOULD RUN OUT BEFORE YOU GOT THE MONEY TO BUY MORE: SOMETIMES TRUE
HOW OFTEN DO YOU ATTEND CHURCH OR RELIGIOUS SERVICES?: MORE THAN 4 TIMES PER YEAR
HOW OFTEN DO YOU ATTENT MEETINGS OF THE CLUB OR ORGANIZATION YOU BELONG TO?: MORE THAN 4 TIMES PER YEAR
HOW MANY DRINKS CONTAINING ALCOHOL DO YOU HAVE ON A TYPICAL DAY WHEN YOU ARE DRINKING: 3 OR 4

## 2025-06-01 DIAGNOSIS — S93.402A SPRAIN OF LEFT ANKLE, UNSPECIFIED LIGAMENT, INITIAL ENCOUNTER: ICD-10-CM

## 2025-06-02 ENCOUNTER — APPOINTMENT (OUTPATIENT)
Dept: MEDICAL GROUP | Facility: CLINIC | Age: 40
End: 2025-06-02
Payer: COMMERCIAL

## 2025-06-02 NOTE — TELEPHONE ENCOUNTER
Requested Prescriptions     Pending Prescriptions Disp Refills    ibuprofen (MOTRIN) 800 MG Tab [Pharmacy Med Name: Ibuprofen 800 MG Oral Tablet] 40 Tablet 0     Sig: TAKE 1 TABLET BY MOUTH EVERY 8 HOURS AS NEEDED FOR MODERATE PAIN . APPOINTMENT REQUIRED FOR FUTURE REFILLS     Last office visit: 5/21/25  Last lab: 5/27/25

## 2025-06-03 RX ORDER — IBUPROFEN 800 MG/1
TABLET, FILM COATED ORAL
Qty: 40 TABLET | Refills: 0 | Status: SHIPPED | OUTPATIENT
Start: 2025-06-03 | End: 2025-06-19

## 2025-06-19 DIAGNOSIS — E55.9 VITAMIN D DEFICIENCY: ICD-10-CM

## 2025-06-19 DIAGNOSIS — S93.402A SPRAIN OF LEFT ANKLE, UNSPECIFIED LIGAMENT, INITIAL ENCOUNTER: ICD-10-CM

## 2025-06-19 RX ORDER — ERGOCALCIFEROL 1.25 MG/1
CAPSULE, LIQUID FILLED ORAL
Qty: 12 CAPSULE | Refills: 2 | Status: SHIPPED | OUTPATIENT
Start: 2025-06-19

## 2025-06-19 RX ORDER — IBUPROFEN 800 MG/1
800 TABLET, FILM COATED ORAL EVERY 8 HOURS PRN
Qty: 40 TABLET | Refills: 2 | Status: SHIPPED | OUTPATIENT
Start: 2025-06-19

## 2025-06-19 NOTE — TELEPHONE ENCOUNTER
Requested Prescriptions     Pending Prescriptions Disp Refills    vitamin D2 (ERGOCALCIFEROL) 1.25 mg (74557 Units) Cap capsule 12 Capsule 2     Sig: TAKE ONE CAPSULE BY MOUTH ONCE WEEKLY     Last office visit: 5/21/25  Last lab: 5/27/25

## 2025-06-23 ENCOUNTER — OFFICE VISIT (OUTPATIENT)
Dept: MEDICAL GROUP | Facility: CLINIC | Age: 40
End: 2025-06-23
Payer: COMMERCIAL

## 2025-06-23 VITALS
SYSTOLIC BLOOD PRESSURE: 154 MMHG | BODY MASS INDEX: 41.83 KG/M2 | RESPIRATION RATE: 16 BRPM | OXYGEN SATURATION: 97 % | DIASTOLIC BLOOD PRESSURE: 110 MMHG | TEMPERATURE: 98.2 F | HEIGHT: 62 IN | WEIGHT: 227.29 LBS | HEART RATE: 91 BPM

## 2025-06-23 DIAGNOSIS — K44.9 HIATAL HERNIA: ICD-10-CM

## 2025-06-23 DIAGNOSIS — M54.16 LUMBAR RADICULOPATHY: ICD-10-CM

## 2025-06-23 DIAGNOSIS — I10 PRIMARY HYPERTENSION: Primary | ICD-10-CM

## 2025-06-23 DIAGNOSIS — T74.11XA ABUSIVE PHYSICAL RELATIONSHIP WITH PARTNER OR SPOUSE, INITIAL ENCOUNTER: ICD-10-CM

## 2025-06-23 DIAGNOSIS — K21.9 GASTROESOPHAGEAL REFLUX DISEASE WITHOUT ESOPHAGITIS: ICD-10-CM

## 2025-06-23 DIAGNOSIS — R21 RASH: ICD-10-CM

## 2025-06-23 PROCEDURE — 3077F SYST BP >= 140 MM HG: CPT | Performed by: PHYSICIAN ASSISTANT

## 2025-06-23 PROCEDURE — 99214 OFFICE O/P EST MOD 30 MIN: CPT | Performed by: PHYSICIAN ASSISTANT

## 2025-06-23 PROCEDURE — 3080F DIAST BP >= 90 MM HG: CPT | Performed by: PHYSICIAN ASSISTANT

## 2025-06-23 RX ORDER — METOPROLOL SUCCINATE 50 MG/1
50 TABLET, EXTENDED RELEASE ORAL DAILY
Qty: 90 TABLET | Refills: 2 | Status: SHIPPED | OUTPATIENT
Start: 2025-06-23

## 2025-06-23 RX ORDER — METHYLPREDNISOLONE 4 MG/1
TABLET ORAL
Qty: 21 EACH | Refills: 0 | Status: SHIPPED | OUTPATIENT
Start: 2025-06-23

## 2025-06-23 RX ORDER — OMEPRAZOLE 40 MG/1
40 CAPSULE, DELAYED RELEASE ORAL DAILY
Qty: 90 CAPSULE | Refills: 2 | Status: SHIPPED | OUTPATIENT
Start: 2025-06-23

## 2025-06-23 RX ORDER — ONDANSETRON 4 MG/1
4 TABLET, ORALLY DISINTEGRATING ORAL EVERY 6 HOURS PRN
Qty: 10 TABLET | Refills: 0 | Status: SHIPPED | OUTPATIENT
Start: 2025-06-23

## 2025-06-23 ASSESSMENT — FIBROSIS 4 INDEX: FIB4 SCORE: 0.43
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
## 2025-06-24 NOTE — Clinical Note
REFERRAL APPROVAL NOTICE         Sent on June 24, 2025                   Jennifer Gee  [address]   [address]  [facility] [address]                   Dear Ms. Gee,    After a careful review of the medical information and benefit coverage, [facility] has processed your referral. See below for additional details.    If applicable, you must be actively enrolled with your insurance for coverage of the authorized service. If you have any questions regarding your coverage, please contact your insurance directly.    REFERRAL INFORMATION   Referral #:  [id number]  Referred-To Provider    Referred-By Provider:  Gastroenterology    Romelia Estrada P.A.-C.   [facility]      [address] [id number] [phone] [address] [facility] [id number] [phone]    Referral Start Date:  06/23/2025  Referral End Date:   06/23/2026             SCHEDULING  If you do not already have an appointment, please call [phone] to make an appointment.     MORE INFORMATION  If you do not already have a "[email or web address] account, sign up at: [email or web address]  You can access your medical information, make appointments, see lab results, billing information, and more.  If you have questions regarding this referral, please contact  the [facility] Referrals department at:             [phone]. Monday - Friday 8:00AM - 5:00PM.     Sincerely,    [facility]

## 2025-07-14 ENCOUNTER — APPOINTMENT (OUTPATIENT)
Dept: MEDICAL GROUP | Facility: CLINIC | Age: 40
End: 2025-07-14
Payer: COMMERCIAL

## 2025-08-04 ENCOUNTER — APPOINTMENT (OUTPATIENT)
Dept: MEDICAL GROUP | Facility: CLINIC | Age: 40
End: 2025-08-04
Payer: COMMERCIAL

## 2025-08-04 PROBLEM — E66.09 OBESITY DUE TO EXCESS CALORIES WITH SERIOUS COMORBIDITY: Status: ACTIVE | Noted: 2025-08-04

## 2025-08-04 ASSESSMENT — FIBROSIS 4 INDEX: FIB4 SCORE: 0.43
